# Patient Record
Sex: MALE | Race: WHITE | NOT HISPANIC OR LATINO | Employment: UNEMPLOYED | URBAN - METROPOLITAN AREA
[De-identification: names, ages, dates, MRNs, and addresses within clinical notes are randomized per-mention and may not be internally consistent; named-entity substitution may affect disease eponyms.]

---

## 2017-06-13 ENCOUNTER — RECORDS - HEALTHEAST (OUTPATIENT)
Dept: LAB | Facility: CLINIC | Age: 62
End: 2017-06-13

## 2017-06-13 LAB
CHOLEST SERPL-MCNC: 210 MG/DL
FASTING STATUS PATIENT QL REPORTED: NO
HDLC SERPL-MCNC: 43 MG/DL
LDLC SERPL CALC-MCNC: 140 MG/DL
PSA SERPL-MCNC: 0.7 NG/ML (ref 0–4.5)
TRIGL SERPL-MCNC: 136 MG/DL

## 2021-05-31 ENCOUNTER — RECORDS - HEALTHEAST (OUTPATIENT)
Dept: ADMINISTRATIVE | Facility: CLINIC | Age: 66
End: 2021-05-31

## 2021-08-15 ENCOUNTER — HEALTH MAINTENANCE LETTER (OUTPATIENT)
Age: 66
End: 2021-08-15

## 2021-10-11 ENCOUNTER — HEALTH MAINTENANCE LETTER (OUTPATIENT)
Age: 66
End: 2021-10-11

## 2021-12-01 ENCOUNTER — LAB REQUISITION (OUTPATIENT)
Dept: LAB | Facility: CLINIC | Age: 66
End: 2021-12-01
Payer: MEDICARE

## 2021-12-01 DIAGNOSIS — Z03.818 ENCOUNTER FOR OBSERVATION FOR SUSPECTED EXPOSURE TO OTHER BIOLOGICAL AGENTS RULED OUT: ICD-10-CM

## 2021-12-01 PROCEDURE — U0005 INFEC AGEN DETEC AMPLI PROBE: HCPCS | Performed by: FAMILY MEDICINE

## 2021-12-02 LAB — SARS-COV-2 RNA RESP QL NAA+PROBE: NEGATIVE

## 2022-01-30 ENCOUNTER — HEALTH MAINTENANCE LETTER (OUTPATIENT)
Age: 67
End: 2022-01-30

## 2022-02-08 ENCOUNTER — LAB REQUISITION (OUTPATIENT)
Dept: LAB | Facility: CLINIC | Age: 67
End: 2022-02-08
Payer: MEDICARE

## 2022-02-08 DIAGNOSIS — Z03.818 ENCOUNTER FOR OBSERVATION FOR SUSPECTED EXPOSURE TO OTHER BIOLOGICAL AGENTS RULED OUT: ICD-10-CM

## 2022-02-08 PROCEDURE — 87081 CULTURE SCREEN ONLY: CPT | Mod: ORL | Performed by: NURSE PRACTITIONER

## 2022-02-12 LAB — BACTERIA SPEC CULT: NORMAL

## 2022-06-07 ENCOUNTER — LAB REQUISITION (OUTPATIENT)
Dept: LAB | Facility: CLINIC | Age: 67
End: 2022-06-07
Payer: COMMERCIAL

## 2022-06-07 DIAGNOSIS — I10 ESSENTIAL (PRIMARY) HYPERTENSION: ICD-10-CM

## 2022-06-07 DIAGNOSIS — Z12.5 ENCOUNTER FOR SCREENING FOR MALIGNANT NEOPLASM OF PROSTATE: ICD-10-CM

## 2022-06-07 DIAGNOSIS — E78.5 HYPERLIPIDEMIA, UNSPECIFIED: ICD-10-CM

## 2022-06-07 DIAGNOSIS — Z00.00 ENCOUNTER FOR GENERAL ADULT MEDICAL EXAMINATION WITHOUT ABNORMAL FINDINGS: ICD-10-CM

## 2022-06-07 LAB
ALBUMIN SERPL-MCNC: 4.2 G/DL (ref 3.5–5)
ALP SERPL-CCNC: 58 U/L (ref 45–120)
ALT SERPL W P-5'-P-CCNC: 27 U/L (ref 0–45)
ANION GAP SERPL CALCULATED.3IONS-SCNC: 12 MMOL/L (ref 5–18)
AST SERPL W P-5'-P-CCNC: 26 U/L (ref 0–40)
BILIRUB SERPL-MCNC: 1.6 MG/DL (ref 0–1)
BUN SERPL-MCNC: 19 MG/DL (ref 8–22)
CALCIUM SERPL-MCNC: 9.6 MG/DL (ref 8.5–10.5)
CHLORIDE BLD-SCNC: 104 MMOL/L (ref 98–107)
CHOLEST SERPL-MCNC: 201 MG/DL
CO2 SERPL-SCNC: 23 MMOL/L (ref 22–31)
CREAT SERPL-MCNC: 1.05 MG/DL (ref 0.7–1.3)
FASTING STATUS PATIENT QL REPORTED: ABNORMAL
GFR SERPL CREATININE-BSD FRML MDRD: 79 ML/MIN/1.73M2
GLUCOSE BLD-MCNC: 86 MG/DL (ref 70–125)
HDLC SERPL-MCNC: 41 MG/DL
LDLC SERPL CALC-MCNC: 131 MG/DL
POTASSIUM BLD-SCNC: 4.2 MMOL/L (ref 3.5–5)
PROT SERPL-MCNC: 6.9 G/DL (ref 6–8)
PSA SERPL-MCNC: 0.79 UG/L (ref 0–4.5)
SODIUM SERPL-SCNC: 139 MMOL/L (ref 136–145)
TRIGL SERPL-MCNC: 145 MG/DL

## 2022-06-07 PROCEDURE — G0103 PSA SCREENING: HCPCS | Mod: ORL | Performed by: FAMILY MEDICINE

## 2022-06-07 PROCEDURE — 80053 COMPREHEN METABOLIC PANEL: CPT | Mod: ORL | Performed by: FAMILY MEDICINE

## 2022-06-07 PROCEDURE — 80061 LIPID PANEL: CPT | Mod: ORL | Performed by: FAMILY MEDICINE

## 2022-07-05 ENCOUNTER — LAB REQUISITION (OUTPATIENT)
Dept: LAB | Facility: CLINIC | Age: 67
End: 2022-07-05

## 2022-07-05 DIAGNOSIS — I10 ESSENTIAL (PRIMARY) HYPERTENSION: ICD-10-CM

## 2022-07-05 LAB
ANION GAP SERPL CALCULATED.3IONS-SCNC: 13 MMOL/L (ref 7–15)
BUN SERPL-MCNC: 16 MG/DL (ref 8–23)
CALCIUM SERPL-MCNC: 9 MG/DL (ref 8.8–10.2)
CHLORIDE SERPL-SCNC: 104 MMOL/L (ref 98–107)
CREAT SERPL-MCNC: 1.02 MG/DL (ref 0.67–1.17)
DEPRECATED HCO3 PLAS-SCNC: 21 MMOL/L (ref 22–29)
GFR SERPL CREATININE-BSD FRML MDRD: 82 ML/MIN/1.73M2
GLUCOSE SERPL-MCNC: 119 MG/DL (ref 70–99)
POTASSIUM SERPL-SCNC: 3.6 MMOL/L (ref 3.4–5.3)
SODIUM SERPL-SCNC: 138 MMOL/L (ref 136–145)

## 2022-07-05 PROCEDURE — 80048 BASIC METABOLIC PNL TOTAL CA: CPT | Performed by: FAMILY MEDICINE

## 2022-09-25 ENCOUNTER — HEALTH MAINTENANCE LETTER (OUTPATIENT)
Age: 67
End: 2022-09-25

## 2022-11-23 ENCOUNTER — LAB REQUISITION (OUTPATIENT)
Dept: LAB | Facility: CLINIC | Age: 67
End: 2022-11-23

## 2022-11-23 DIAGNOSIS — I10 ESSENTIAL (PRIMARY) HYPERTENSION: ICD-10-CM

## 2022-11-23 LAB
ANION GAP SERPL CALCULATED.3IONS-SCNC: 13 MMOL/L (ref 7–15)
BUN SERPL-MCNC: 17.7 MG/DL (ref 8–23)
CALCIUM SERPL-MCNC: 9.3 MG/DL (ref 8.8–10.2)
CHLORIDE SERPL-SCNC: 101 MMOL/L (ref 98–107)
CREAT SERPL-MCNC: 1.12 MG/DL (ref 0.67–1.17)
DEPRECATED HCO3 PLAS-SCNC: 24 MMOL/L (ref 22–29)
GFR SERPL CREATININE-BSD FRML MDRD: 73 ML/MIN/1.73M2
GLUCOSE SERPL-MCNC: 90 MG/DL (ref 70–99)
POTASSIUM SERPL-SCNC: 4.2 MMOL/L (ref 3.4–5.3)
SODIUM SERPL-SCNC: 138 MMOL/L (ref 136–145)

## 2022-11-23 PROCEDURE — 80048 BASIC METABOLIC PNL TOTAL CA: CPT | Performed by: NURSE PRACTITIONER

## 2023-03-02 ENCOUNTER — LAB REQUISITION (OUTPATIENT)
Dept: LAB | Facility: CLINIC | Age: 68
End: 2023-03-02

## 2023-03-02 DIAGNOSIS — I10 ESSENTIAL (PRIMARY) HYPERTENSION: ICD-10-CM

## 2023-03-02 LAB
ANION GAP SERPL CALCULATED.3IONS-SCNC: 12 MMOL/L (ref 7–15)
BUN SERPL-MCNC: 20.5 MG/DL (ref 8–23)
CALCIUM SERPL-MCNC: 9.2 MG/DL (ref 8.8–10.2)
CHLORIDE SERPL-SCNC: 99 MMOL/L (ref 98–107)
CREAT SERPL-MCNC: 1.46 MG/DL (ref 0.67–1.17)
DEPRECATED HCO3 PLAS-SCNC: 25 MMOL/L (ref 22–29)
GFR SERPL CREATININE-BSD FRML MDRD: 53 ML/MIN/1.73M2
GLUCOSE SERPL-MCNC: 84 MG/DL (ref 70–99)
POTASSIUM SERPL-SCNC: 4.3 MMOL/L (ref 3.4–5.3)
SODIUM SERPL-SCNC: 136 MMOL/L (ref 136–145)

## 2023-03-02 PROCEDURE — 84295 ASSAY OF SERUM SODIUM: CPT | Performed by: NURSE PRACTITIONER

## 2023-03-31 ENCOUNTER — LAB REQUISITION (OUTPATIENT)
Dept: LAB | Facility: CLINIC | Age: 68
End: 2023-03-31

## 2023-03-31 DIAGNOSIS — M25.561 PAIN IN RIGHT KNEE: ICD-10-CM

## 2023-03-31 LAB — URATE SERPL-MCNC: 7 MG/DL (ref 3.4–7)

## 2023-03-31 PROCEDURE — 84550 ASSAY OF BLOOD/URIC ACID: CPT | Performed by: PHYSICIAN ASSISTANT

## 2023-04-17 ENCOUNTER — LAB REQUISITION (OUTPATIENT)
Dept: LAB | Facility: CLINIC | Age: 68
End: 2023-04-17

## 2023-04-17 DIAGNOSIS — I10 ESSENTIAL (PRIMARY) HYPERTENSION: ICD-10-CM

## 2023-04-17 LAB
ALBUMIN SERPL BCG-MCNC: 4.7 G/DL (ref 3.5–5.2)
ALP SERPL-CCNC: 57 U/L (ref 40–129)
ALT SERPL W P-5'-P-CCNC: 26 U/L (ref 10–50)
ANION GAP SERPL CALCULATED.3IONS-SCNC: 15 MMOL/L (ref 7–15)
AST SERPL W P-5'-P-CCNC: 21 U/L (ref 10–50)
BILIRUB SERPL-MCNC: 1.3 MG/DL
BUN SERPL-MCNC: 20.3 MG/DL (ref 8–23)
CALCIUM SERPL-MCNC: 9.9 MG/DL (ref 8.8–10.2)
CHLORIDE SERPL-SCNC: 95 MMOL/L (ref 98–107)
CREAT SERPL-MCNC: 1.32 MG/DL (ref 0.67–1.17)
DEPRECATED HCO3 PLAS-SCNC: 25 MMOL/L (ref 22–29)
GFR SERPL CREATININE-BSD FRML MDRD: 59 ML/MIN/1.73M2
GLUCOSE SERPL-MCNC: 94 MG/DL (ref 70–99)
POTASSIUM SERPL-SCNC: 5.3 MMOL/L (ref 3.4–5.3)
PROT SERPL-MCNC: 7.1 G/DL (ref 6.4–8.3)
SODIUM SERPL-SCNC: 135 MMOL/L (ref 136–145)

## 2023-04-17 PROCEDURE — 80053 COMPREHEN METABOLIC PANEL: CPT | Performed by: PHYSICIAN ASSISTANT

## 2023-05-13 ENCOUNTER — HEALTH MAINTENANCE LETTER (OUTPATIENT)
Age: 68
End: 2023-05-13

## 2023-06-20 ENCOUNTER — APPOINTMENT (OUTPATIENT)
Dept: CT IMAGING | Facility: HOSPITAL | Age: 68
End: 2023-06-20
Attending: STUDENT IN AN ORGANIZED HEALTH CARE EDUCATION/TRAINING PROGRAM
Payer: COMMERCIAL

## 2023-06-20 ENCOUNTER — APPOINTMENT (OUTPATIENT)
Dept: SPEECH THERAPY | Facility: CLINIC | Age: 68
DRG: 037 | End: 2023-06-20
Attending: PHYSICIAN ASSISTANT
Payer: COMMERCIAL

## 2023-06-20 ENCOUNTER — APPOINTMENT (OUTPATIENT)
Dept: CARDIOLOGY | Facility: CLINIC | Age: 68
DRG: 037 | End: 2023-06-20
Attending: PHYSICIAN ASSISTANT
Payer: COMMERCIAL

## 2023-06-20 ENCOUNTER — HOSPITAL ENCOUNTER (INPATIENT)
Facility: CLINIC | Age: 68
LOS: 4 days | Discharge: HOME | DRG: 037 | End: 2023-06-24
Attending: HOSPITALIST | Admitting: INTERNAL MEDICINE
Payer: COMMERCIAL

## 2023-06-20 ENCOUNTER — APPOINTMENT (OUTPATIENT)
Dept: MRI IMAGING | Facility: HOSPITAL | Age: 68
End: 2023-06-20
Attending: EMERGENCY MEDICINE
Payer: COMMERCIAL

## 2023-06-20 ENCOUNTER — HOSPITAL ENCOUNTER (EMERGENCY)
Facility: HOSPITAL | Age: 68
Discharge: ANOTHER HOSPITAL | End: 2023-06-20
Attending: EMERGENCY MEDICINE | Admitting: EMERGENCY MEDICINE
Payer: COMMERCIAL

## 2023-06-20 VITALS
RESPIRATION RATE: 25 BRPM | WEIGHT: 155 LBS | TEMPERATURE: 98.2 F | OXYGEN SATURATION: 94 % | HEIGHT: 66 IN | HEART RATE: 85 BPM | BODY MASS INDEX: 24.91 KG/M2 | SYSTOLIC BLOOD PRESSURE: 158 MMHG | DIASTOLIC BLOOD PRESSURE: 104 MMHG

## 2023-06-20 DIAGNOSIS — I63.9 ACUTE CVA (CEREBROVASCULAR ACCIDENT) (H): Primary | ICD-10-CM

## 2023-06-20 DIAGNOSIS — I65.22 STENOSIS OF LEFT CAROTID ARTERY: ICD-10-CM

## 2023-06-20 DIAGNOSIS — I10 BENIGN ESSENTIAL HYPERTENSION: ICD-10-CM

## 2023-06-20 DIAGNOSIS — R47.81 SLURRED SPEECH: ICD-10-CM

## 2023-06-20 DIAGNOSIS — E78.5 HYPERLIPIDEMIA LDL GOAL <70: ICD-10-CM

## 2023-06-20 LAB
ANION GAP SERPL CALCULATED.3IONS-SCNC: 12 MMOL/L (ref 7–15)
APTT PPP: 24 SECONDS (ref 22–38)
BASOPHILS # BLD AUTO: 0 10E3/UL (ref 0–0.2)
BASOPHILS NFR BLD AUTO: 0 %
BUN SERPL-MCNC: 19.6 MG/DL (ref 8–23)
CALCIUM SERPL-MCNC: 9.8 MG/DL (ref 8.8–10.2)
CHLORIDE SERPL-SCNC: 101 MMOL/L (ref 98–107)
CREAT SERPL-MCNC: 1.09 MG/DL (ref 0.67–1.17)
DEPRECATED HCO3 PLAS-SCNC: 24 MMOL/L (ref 22–29)
EOSINOPHIL # BLD AUTO: 0 10E3/UL (ref 0–0.7)
EOSINOPHIL NFR BLD AUTO: 0 %
ERYTHROCYTE [DISTWIDTH] IN BLOOD BY AUTOMATED COUNT: 13.1 % (ref 10–15)
GFR SERPL CREATININE-BSD FRML MDRD: 75 ML/MIN/1.73M2
GLUCOSE BLDC GLUCOMTR-MCNC: 102 MG/DL (ref 70–99)
GLUCOSE BLDC GLUCOMTR-MCNC: 135 MG/DL (ref 70–99)
GLUCOSE BLDC GLUCOMTR-MCNC: 96 MG/DL (ref 70–99)
GLUCOSE SERPL-MCNC: 131 MG/DL (ref 70–99)
HBA1C MFR BLD: 5.9 %
HCT VFR BLD AUTO: 44.6 % (ref 40–53)
HGB BLD-MCNC: 14.7 G/DL (ref 13.3–17.7)
IMM GRANULOCYTES # BLD: 0 10E3/UL
IMM GRANULOCYTES NFR BLD: 0 %
INR PPP: 1.01 (ref 0.85–1.15)
LVEF ECHO: NORMAL
LYMPHOCYTES # BLD AUTO: 1.1 10E3/UL (ref 0.8–5.3)
LYMPHOCYTES NFR BLD AUTO: 8 %
MCH RBC QN AUTO: 27.9 PG (ref 26.5–33)
MCHC RBC AUTO-ENTMCNC: 33 G/DL (ref 31.5–36.5)
MCV RBC AUTO: 85 FL (ref 78–100)
MONOCYTES # BLD AUTO: 0.9 10E3/UL (ref 0–1.3)
MONOCYTES NFR BLD AUTO: 6 %
NEUTROPHILS # BLD AUTO: 11.6 10E3/UL (ref 1.6–8.3)
NEUTROPHILS NFR BLD AUTO: 86 %
NRBC # BLD AUTO: 0 10E3/UL
NRBC BLD AUTO-RTO: 0 /100
PLATELET # BLD AUTO: 209 10E3/UL (ref 150–450)
POTASSIUM SERPL-SCNC: 4.1 MMOL/L (ref 3.4–5.3)
RBC # BLD AUTO: 5.27 10E6/UL (ref 4.4–5.9)
SARS-COV-2 RNA RESP QL NAA+PROBE: NEGATIVE
SODIUM SERPL-SCNC: 137 MMOL/L (ref 136–145)
TROPONIN T SERPL HS-MCNC: 7 NG/L
TROPONIN T SERPL HS-MCNC: 7 NG/L
WBC # BLD AUTO: 13.6 10E3/UL (ref 4–11)

## 2023-06-20 PROCEDURE — 87635 SARS-COV-2 COVID-19 AMP PRB: CPT | Performed by: PHYSICIAN ASSISTANT

## 2023-06-20 PROCEDURE — 93005 ELECTROCARDIOGRAM TRACING: CPT

## 2023-06-20 PROCEDURE — 250N000011 HC RX IP 250 OP 636: Performed by: EMERGENCY MEDICINE

## 2023-06-20 PROCEDURE — 99222 1ST HOSP IP/OBS MODERATE 55: CPT | Performed by: STUDENT IN AN ORGANIZED HEALTH CARE EDUCATION/TRAINING PROGRAM

## 2023-06-20 PROCEDURE — A9585 GADOBUTROL INJECTION: HCPCS | Performed by: EMERGENCY MEDICINE

## 2023-06-20 PROCEDURE — 82962 GLUCOSE BLOOD TEST: CPT

## 2023-06-20 PROCEDURE — 99223 1ST HOSP IP/OBS HIGH 75: CPT | Performed by: PHYSICIAN ASSISTANT

## 2023-06-20 PROCEDURE — 70496 CT ANGIOGRAPHY HEAD: CPT | Mod: MG

## 2023-06-20 PROCEDURE — 120N000001 HC R&B MED SURG/OB

## 2023-06-20 PROCEDURE — 99291 CRITICAL CARE FIRST HOUR: CPT | Mod: 25

## 2023-06-20 PROCEDURE — 92610 EVALUATE SWALLOWING FUNCTION: CPT | Mod: GN | Performed by: SPEECH-LANGUAGE PATHOLOGIST

## 2023-06-20 PROCEDURE — G1010 CDSM STANSON: HCPCS

## 2023-06-20 PROCEDURE — 99207 PR CHGS TRANSFERRED TO HOSPITAL: CPT | Performed by: PHYSICIAN ASSISTANT

## 2023-06-20 PROCEDURE — 85610 PROTHROMBIN TIME: CPT | Performed by: STUDENT IN AN ORGANIZED HEALTH CARE EDUCATION/TRAINING PROGRAM

## 2023-06-20 PROCEDURE — 85730 THROMBOPLASTIN TIME PARTIAL: CPT | Performed by: STUDENT IN AN ORGANIZED HEALTH CARE EDUCATION/TRAINING PROGRAM

## 2023-06-20 PROCEDURE — 36415 COLL VENOUS BLD VENIPUNCTURE: CPT | Performed by: STUDENT IN AN ORGANIZED HEALTH CARE EDUCATION/TRAINING PROGRAM

## 2023-06-20 PROCEDURE — 84484 ASSAY OF TROPONIN QUANT: CPT | Performed by: STUDENT IN AN ORGANIZED HEALTH CARE EDUCATION/TRAINING PROGRAM

## 2023-06-20 PROCEDURE — 82310 ASSAY OF CALCIUM: CPT | Performed by: STUDENT IN AN ORGANIZED HEALTH CARE EDUCATION/TRAINING PROGRAM

## 2023-06-20 PROCEDURE — 99223 1ST HOSP IP/OBS HIGH 75: CPT | Performed by: SURGERY

## 2023-06-20 PROCEDURE — 83036 HEMOGLOBIN GLYCOSYLATED A1C: CPT | Performed by: PHYSICIAN ASSISTANT

## 2023-06-20 PROCEDURE — 93306 TTE W/DOPPLER COMPLETE: CPT

## 2023-06-20 PROCEDURE — 70498 CT ANGIOGRAPHY NECK: CPT | Mod: MG

## 2023-06-20 PROCEDURE — 36415 COLL VENOUS BLD VENIPUNCTURE: CPT | Performed by: PHYSICIAN ASSISTANT

## 2023-06-20 PROCEDURE — 0042T CT HEAD PERFUSION W CONTRAST: CPT

## 2023-06-20 PROCEDURE — 93306 TTE W/DOPPLER COMPLETE: CPT | Mod: 26 | Performed by: INTERNAL MEDICINE

## 2023-06-20 PROCEDURE — 85025 COMPLETE CBC W/AUTO DIFF WBC: CPT | Performed by: STUDENT IN AN ORGANIZED HEALTH CARE EDUCATION/TRAINING PROGRAM

## 2023-06-20 PROCEDURE — 255N000002 HC RX 255 OP 636: Performed by: EMERGENCY MEDICINE

## 2023-06-20 PROCEDURE — 70553 MRI BRAIN STEM W/O & W/DYE: CPT | Mod: MG

## 2023-06-20 PROCEDURE — 80061 LIPID PANEL: CPT | Performed by: PHYSICIAN ASSISTANT

## 2023-06-20 PROCEDURE — 84484 ASSAY OF TROPONIN QUANT: CPT | Performed by: PHYSICIAN ASSISTANT

## 2023-06-20 PROCEDURE — 93005 ELECTROCARDIOGRAM TRACING: CPT | Performed by: STUDENT IN AN ORGANIZED HEALTH CARE EDUCATION/TRAINING PROGRAM

## 2023-06-20 PROCEDURE — 93010 ELECTROCARDIOGRAM REPORT: CPT | Mod: 59 | Performed by: INTERNAL MEDICINE

## 2023-06-20 PROCEDURE — 250N000013 HC RX MED GY IP 250 OP 250 PS 637: Performed by: PHYSICIAN ASSISTANT

## 2023-06-20 RX ORDER — ATORVASTATIN CALCIUM 80 MG/1
80 TABLET, FILM COATED ORAL EVERY EVENING
Status: DISCONTINUED | OUTPATIENT
Start: 2023-06-20 | End: 2023-06-23

## 2023-06-20 RX ORDER — ONDANSETRON 4 MG/1
4 TABLET, ORALLY DISINTEGRATING ORAL EVERY 6 HOURS PRN
Status: DISCONTINUED | OUTPATIENT
Start: 2023-06-20 | End: 2023-06-24 | Stop reason: HOSPADM

## 2023-06-20 RX ORDER — IOPAMIDOL 755 MG/ML
75 INJECTION, SOLUTION INTRAVASCULAR ONCE
Status: COMPLETED | OUTPATIENT
Start: 2023-06-20 | End: 2023-06-20

## 2023-06-20 RX ORDER — LISINOPRIL AND HYDROCHLOROTHIAZIDE 12.5; 2 MG/1; MG/1
1 TABLET ORAL EVERY MORNING
COMMUNITY
End: 2024-04-09

## 2023-06-20 RX ORDER — IOPAMIDOL 755 MG/ML
50 INJECTION, SOLUTION INTRAVASCULAR ONCE
Status: COMPLETED | OUTPATIENT
Start: 2023-06-20 | End: 2023-06-20

## 2023-06-20 RX ORDER — CLOPIDOGREL BISULFATE 75 MG/1
300 TABLET ORAL ONCE
Status: DISCONTINUED | OUTPATIENT
Start: 2023-06-20 | End: 2023-06-20 | Stop reason: HOSPADM

## 2023-06-20 RX ORDER — ASPIRIN 325 MG
325 TABLET ORAL DAILY
Status: DISCONTINUED | OUTPATIENT
Start: 2023-06-20 | End: 2023-06-23

## 2023-06-20 RX ORDER — CLOPIDOGREL BISULFATE 75 MG/1
75 TABLET ORAL DAILY
Status: DISCONTINUED | OUTPATIENT
Start: 2023-06-21 | End: 2023-06-20 | Stop reason: HOSPADM

## 2023-06-20 RX ORDER — GADOBUTROL 604.72 MG/ML
7 INJECTION INTRAVENOUS ONCE
Status: COMPLETED | OUTPATIENT
Start: 2023-06-20 | End: 2023-06-20

## 2023-06-20 RX ORDER — CLOPIDOGREL BISULFATE 75 MG/1
300 TABLET ORAL ONCE
Status: COMPLETED | OUTPATIENT
Start: 2023-06-20 | End: 2023-06-20

## 2023-06-20 RX ORDER — ATORVASTATIN CALCIUM 40 MG/1
80 TABLET, FILM COATED ORAL EVERY EVENING
Status: DISCONTINUED | OUTPATIENT
Start: 2023-06-20 | End: 2023-06-20 | Stop reason: HOSPADM

## 2023-06-20 RX ORDER — ASPIRIN 325 MG
325 TABLET ORAL DAILY
Status: DISCONTINUED | OUTPATIENT
Start: 2023-06-20 | End: 2023-06-20 | Stop reason: HOSPADM

## 2023-06-20 RX ORDER — ONDANSETRON 2 MG/ML
4 INJECTION INTRAMUSCULAR; INTRAVENOUS EVERY 6 HOURS PRN
Status: DISCONTINUED | OUTPATIENT
Start: 2023-06-20 | End: 2023-06-24 | Stop reason: HOSPADM

## 2023-06-20 RX ORDER — CLOPIDOGREL BISULFATE 75 MG/1
75 TABLET ORAL DAILY
Status: DISCONTINUED | OUTPATIENT
Start: 2023-06-21 | End: 2023-06-23

## 2023-06-20 RX ORDER — LIDOCAINE 40 MG/G
CREAM TOPICAL
Status: DISCONTINUED | OUTPATIENT
Start: 2023-06-20 | End: 2023-06-23

## 2023-06-20 RX ADMIN — IOPAMIDOL 50 ML: 755 INJECTION, SOLUTION INTRAVENOUS at 10:36

## 2023-06-20 RX ADMIN — ATORVASTATIN CALCIUM 80 MG: 80 TABLET, FILM COATED ORAL at 21:36

## 2023-06-20 RX ADMIN — IOPAMIDOL 75 ML: 755 INJECTION, SOLUTION INTRAVENOUS at 10:34

## 2023-06-20 RX ADMIN — ASPIRIN 325 MG ORAL TABLET 325 MG: 325 PILL ORAL at 16:14

## 2023-06-20 RX ADMIN — GADOBUTROL 7 ML: 604.72 INJECTION INTRAVENOUS at 12:30

## 2023-06-20 RX ADMIN — CLOPIDOGREL BISULFATE 300 MG: 75 TABLET ORAL at 16:15

## 2023-06-20 ASSESSMENT — ACTIVITIES OF DAILY LIVING (ADL)
ADLS_ACUITY_SCORE: 22
ADLS_ACUITY_SCORE: 22
ADLS_ACUITY_SCORE: 35
DIFFICULTY_EATING/SWALLOWING: NO
ADLS_ACUITY_SCORE: 22
WEAR_GLASSES_OR_BLIND: YES
DOING_ERRANDS_INDEPENDENTLY_DIFFICULTY: NO
CONCENTRATING,_REMEMBERING_OR_MAKING_DECISIONS_DIFFICULTY: NO
DRESSING/BATHING_DIFFICULTY: NO
ADLS_ACUITY_SCORE: 35
ADLS_ACUITY_SCORE: 22
WALKING_OR_CLIMBING_STAIRS_DIFFICULTY: NO
TOILETING_ISSUES: NO
VISION_MANAGEMENT: GLASSES
ADLS_ACUITY_SCORE: 22
FALL_HISTORY_WITHIN_LAST_SIX_MONTHS: NO
CHANGE_IN_FUNCTIONAL_STATUS_SINCE_ONSET_OF_CURRENT_ILLNESS/INJURY: YES

## 2023-06-20 NOTE — PROGRESS NOTES
06/20/23 4384   Appointment Info   Signing Clinician's Name / Credentials (SLP) Santa Berger MS CCC SLP   General Information   Onset of Illness/Injury or Date of Surgery 06/20/23   Referring Physician Cat Rae PA-C   Patient/Family Therapy Goal Statement (SLP) Patient is hungry and thirsty.   Pertinent History of Current Problem Pertinent Labs & Imaging studies reviewed. (See chart for details)  67 year old male with history of HTN, HLD who presents to the Emergency Department for evaluation of slurred speech.  Normal yesterday, spoke with family this morning on the phone with garbled/slurred speech.  Patient stated he felt off all morning but was not speaking to anybody.  Last known well yesterday.  Does have some loss of fluency here with slurring.  NIHSS 1.  Differential includes CVA, TIA, electrolyte abnormality.  No report of seizure activity.   General Observations Per notes: Pertinent Labs & Imaging studies reviewed. (See chart for details)  67 year old male with history of HTN, HLD who presents to the Emergency Department for evaluation of slurred speech.  Normal yesterday, spoke with family this morning on the phone with garbled/slurred speech.  Patient stated he felt off all morning but was not speaking to anybody.  Last known well yesterday.  Does have some loss of fluency here with slurring.  NIHSS 1.  Differential includes CVA, TIA, electrolyte abnormality.  No report of seizure activity.   Type of Evaluation   Type of Evaluation Swallow Evaluation   Oral Motor   Oral Musculature generally intact   Structural Abnormalities none present   Mucosal Quality dry   Dentition (Oral Motor)   Dentition (Oral Motor) significant number of missing teeth;dentition impacts chewing ability;other (see comments)  (Patient has five teeth.)   Facial Symmetry (Oral Motor)   Facial Symmetry (Oral Motor) WNL   Lip Function (Oral Motor)   Lip Range of Motion (Oral Motor) WNL   Tongue Function (Oral  Motor)   Tongue ROM (Oral Motor) WNL   Jaw Function (Oral Motor)   Jaw Function (Oral Motor) WNL   Cough/Swallow/Gag Reflex (Oral Motor)   Soft Palate/Velum (Oral Motor) WNL   Volitional Throat Clear/Cough (Oral Motor) WNL   Volitional Swallow (Oral Motor) WNL   Vocal Quality/Secretion Management (Oral Motor)   Vocal Quality (Oral Motor) WFL   General Swallowing Observations   Past History of Dysphagia No documented history   Respiratory Support (General Swallowing Observations) none   Current Diet/Method of Nutritional Intake (General Swallowing Observations, NIS) NPO   Swallowing Evaluation Clinical swallow evaluation   Clinical Swallow Evaluation   Feeding Assistance no assistance needed   Clinical Swallow Evaluation Textures Trialed thin liquids;pureed;solid foods   Clinical Swallow Eval: Thin Liquid Texture Trial   Mode of Presentation, Thin Liquids cup;self-fed   Volume of Liquid or Food Presented 6 oz of water   Oral Phase of Swallow WFL   Pharyngeal Phase of Swallow intact   Diagnostic Statement No immediate or delayed sx of aspiration.   Clinical Swallow Evaluation: Puree Solid Texture Trial   Mode of Presentation, Puree spoon;self-fed   Volume of Puree Presented 2 oz of pudding   Oral Phase, Puree WFL   Pharyngeal Phase, Puree intact   Clinical Swallow Evaluation: Solid Food Texture Trial   Mode of Presentation self-fed   Volume Presented 1/2 dileep cracker   Oral Phase impaired mastication;residue in oral cavity   Oral Residue mid posterior tongue   Pharyngeal Phase impaired;feeling of something stuck in throat;repeated swallows   Diagnostic Statement Patient reported a globus sensation with the cracker that cleared with a liquid rinse.   Esophageal Phase of Swallow   Patient reports or presents with symptoms of esophageal dysphagia Yes   Esophageal comments Patient reports a history of GERDS.   Swallowing Recommendations   Diet Consistency Recommendations soft & bite-sized (level 6);thin liquids (level  0)   Supervision Level for Intake distant supervision needed   Mode of Delivery Recommendations bolus size, small;food moistened;no straws;slow rate of intake   Postural Recommendations none   Swallowing Maneuver Recommendations alternate food and liquid intake   Monitoring/Assistance Required (Eating/Swallowing) check mouth frequently for oral residue/pocketing;stop eating activities when fatigue is present;monitor for cough or change in vocal quality with intake   Recommended Feeding/Eating Techniques (Swallow Eval) maintain upright sitting position for eating;maintain upright posture during/after eating for 30 minutes;minimize distractions during oral intake   Medication Administration Recommendations, Swallowing (SLP) Whole as tolerated.   General Therapy Interventions   Planned Therapy Interventions Dysphagia Treatment   Dysphagia treatment Modified diet education;Instruction of safe swallow strategies   Clinical Impression   Criteria for Skilled Therapeutic Interventions Met (SLP Eval) Yes, treatment indicated   SLP Diagnosis Mild oral and pharyngeal dysphagia   Risks & Benefits of therapy have been explained evaluation/treatment results reviewed;care plan/treatment goals reviewed;risks/benefits reviewed;current/potential barriers reviewed;participants voiced agreement with care plan;participants included;patient   Clinical Impression Comments Patient presents with mild oral and pharyngeal dysphagia at bedside. Patient's oral motor exam was WFL. He does have mild slurred speech but lacks dentition (5 teeth), but noted to have mild word finding in conversation. (Thinking of the name of his clinic). He demonstrated a timely swallow response with thin liquids. Mastication was prolonged given lack of dentition, mildly decreased bolus control during AP movement with minimal to mild oral residue. He reported a globus sensation after swallowing the cracker. The sensation cleared with a liquid rinse.     Recommend: 1.  IDDSI level 6 soft/bite size and thin liquids. 2. Upight, no straws, slow rate and alternate liquids//solids. 3. SLP will follow up for diet tolerance and advancement. Hold diet if any sx of aspiration present or changes in his respiratory status.     SLP Total Evaluation Time   Eval: oral/pharyngeal swallow function, clinical swallow Minutes (51929) 15   SLP Goals   Therapy Frequency (SLP Eval) 5 times/wk   SLP Predicted Duration/Target Date for Goal Attainment 06/27/23   SLP Goals Swallow   SLP: Safely tolerate diet without signs/symptoms of aspiration Regular diet;Thin liquids;With use of swallow precautions;With assistance/supervision   Interventions   Interventions Quick Adds Swallowing Dysfunction   SLP Discharge Planning   SLP Plan Meal f/u advance as tolerated.   SLP Discharge Recommendation home with outpatient speech therapy;home with assist   SLP Rationale for DC Rec Patient's swallow and communication are mildly below his baseline, but anticipate they will be met prior to discharge.   SLP Brief overview of current status  Minimal to mild oral and pharyngeal dysphagia recommend IDDSI level 6 soft/bite size and thin liquids.   Total Session Time   Total Session Time (sum of timed and untimed services) 15

## 2023-06-20 NOTE — ED NOTES
Bed: JNEDH-B  Expected date:   Expected time:   Means of arrival:   Comments:  Tier 2 STROKE CODE BRITNEY

## 2023-06-20 NOTE — CONSULTS
[facility]    Stroke Telephone Note    I was called by Basia Fortune on 06/20/23 regarding patient Rich Moffett. The patient is a 67 year old male without known pertinent medical history.    He presented to the [facility] ED today for speech changes. He was LKW 2p yesterday when spoke with family. This AM felt off and shaky but hadn't tried to speak until called family and was garbled/slurred. In ED speech improved with only mild slurring. /103.    Stroke Code Data (for stroke code without tele)  Stroke code activated 06/20/23   1012   Stroke provider first response  06/20/23   1014            Last known normal 06/19/23   1400        Time of discovery   (or onset of symptoms) 06/20/23   (S)  (when spoke to family on the phone, but had been up prior not speaking but feeling off)   Head CT read by Stroke Neuro Dr/Provider 06/20/23   1020   Was stroke code de-escalated? Yes 06/20/23 1040          Imaging Findings   HEAD CT:  1.  No acute intracranial abnormality.     HEAD CTA:   1.  All of the major large-caliber proximal intracranial vessels are patent without definite aneurysm or AVM.  2.  Mild to moderate calcific atheromatous changes in both carotid siphons.     NECK CTA:  1.  There are 2 somewhat irregular atheromatous stenoses in the proximal left ICA. The more proximal stenosis at the origin of the left ICA is in the 60% range. At the distal aspect of the left carotid bulb is a more severe stenosis in the 85% range.   Flow is preserved distally in the left ICA without dissection changes.  2.  No significant stenosis or dissection in the right carotid system or in either vertebral artery in the neck.     CT PERFUSION:  1.  Normal cerebral perfusion.  CBF 0 mL, Tmax 0 mL    Intravenous Thrombolysis  Not given due to:   - minor/isolated/quickly resolving symptoms  - unclear or unfavorable risk-benefit profile for extended window thrombolysis beyond the conventional 4.5 hour  "time window    Endovascular Treatment  Not initiated due to absence of proximal vessel occlusion    Impression  Slurred/garbled speech, evaluate for stroke    2 somewhat irregular atheromatous stenoses in the proximal left ICA. The more proximal stenosis at the origin of the left ICA is in the 60% range. At the distal aspect of the left carotid bulb is a more severe stenosis in the 85% range, query if symptomatic    Recommendations   -MRI brain w/wo contrast, please page stroke team once completed so we can review imaging  -SBP goal <220 pending MRI  -if symptomatic L ICA then will need vascular surgery evaluation    My recommendations are based on the information provided over the phone by Rich Moffett's in-person providers. They are not intended to replace the clinical judgment of his in-person providers. I was not requested to personally see or examine the patient at this time.    Alexus Blackwell PA-C  Vascular Neurology    To page me or covering stroke neurology team member, click here: [facility]  Choose \"On Call\" tab at top, then select \"NEUROLOGY/ALL SITES\" from middle drop-down box, press Enter, then look for \"stroke\" or \"telestroke\" for your site.      "

## 2023-06-20 NOTE — CONSULTS
Vascular surgery attending staff note: I have seen and examined the patient myself.  There is a kind consultation from Ms. Cat Bell for severe left carotid stenosis.    History of presenting illness: Patient is a right-hand-dominant 67-year-old male.  When I interviewed him he was accompanied by his daughter and his son-in-law.  He presented to [facility] emergency department earlier today after acute onset of dysarthria.  He was talking on his phone in the morning when they noted dysarthria and patient was brought to the emergency department.  Code stroke was called.  CT angiogram showed severe stenosis of the left carotid bulb and distal internal carotid artery.  Patient and his family tell me that he is almost back to baseline from that initial episode.  Patient denies having had any motor or sensory changes in any other part of the body during this.    Patient does not smoke and he lives with his 5-year-old son.  He also has 3 other independent living older daughters.  1 of which was present today.    Past medical history is notable for hypertension, gastroesophageal reflux disease and sensorineural hearing loss.    Past surgical history includes bilateral knee surgery and left elbow surgery.    Social history: He lives independently.  He does not consume tobacco products.    On examination: He appears comfortable and he is in no acute distress.  Vital signs reviewed.  Mental: Alert and oriented x4, judgment and insight are good.  Cardiac: Regular rate and rhythm, S1 plus S2 +0.  Eyes: Extraocular motions are intact, sclerae are anicteric.  Chest: Clear to auscultation bilaterally.  Neuro: Moving both upper and lower extremities with equal 5 out of 5 power.    Imaging data: CT angiogram of the head and neck that was done earlier today showed 60% stenosis of the carotid bulb with soft plaque with further distally has another area of about 80% stenosis.  Vertebral arteries are patent.    Right  carotid system is patent.    Transthoracic echo is essentially unremarkable.    Diagnosis: Severe asymptomatic left carotid stenosis with transient ischemic attack.    Plan: I explained the pathophysiology of disease to the patient and to his family.  I am waiting for Dr. Cordero to also evaluate the patient from the neurology team.  I will wait for his go ahead but I do feel that this is symptomatic carotid stenosis and patient will benefit from a left carotid endarterectomy for prevention of future disabling stroke.

## 2023-06-20 NOTE — ED NOTES
Patient is in CT with SWAT nurse and ED tech. Will obtain handoff when patient returns to ED. This writer has not seen this patient at this time.

## 2023-06-20 NOTE — ED TRIAGE NOTES
Patient presents here for evaluation of confusion and slurred speech. He did display subtle symptoms over the weekend, but his family reports significant change in mentation, developed slurred speech and has a mild headache.      Triage Assessment     Row Name 06/20/23 1012       Triage Assessment (Adult)    Airway WDL WDL       Respiratory WDL    Respiratory WDL WDL       Skin Circulation/Temperature WDL    Skin Circulation/Temperature WDL WDL       Cardiac WDL    Cardiac WDL WDL       Peripheral/Neurovascular WDL    Peripheral Neurovascular WDL WDL       Cognitive/Neuro/Behavioral WDL    Cognitive/Neuro/Behavioral WDL WDL

## 2023-06-20 NOTE — ED NOTES
Report called to Omaira STEWART at [facility]. Will arrange transport when patient gets back from MRI.

## 2023-06-20 NOTE — ED PROVIDER NOTES
EMERGENCY DEPARTMENT ENCOUNTER      NAME: Rich Moffett  AGE: 67 year old male  YOB: 1955  MRN: [id number]  EVALUATION DATE & TIME: 6/20/2023 10:14 AM    PCP: No primary care provider on file.    ED PROVIDER: Basia Fortune MD    Chief Complaint   Patient presents with     Confusion     Slurred Speech         FINAL IMPRESSION:  1. Slurred speech    2. Stenosis of left carotid artery          ED COURSE & MEDICAL DECISION MAKING:    Pertinent Labs & Imaging studies reviewed. (See chart for details)  67 year old male with history of HTN, HLD who presents to the Emergency Department for evaluation of slurred speech.  Normal yesterday, spoke with family this morning on the phone with garbled/slurred speech.  Patient stated he felt off all morning but was not speaking to anybody.  Last known well yesterday.  Does have some loss of fluency here with slurring.  NIHSS 1.  Differential includes CVA, TIA, electrolyte abnormality.  No report of seizure activity.    Patient initially seen evaluated by myself in triage area due to boarding crisis.  Stroke alert activated.  Expedited for neuroimaging.  CT head unremarkable.  CTA notable for left-sided carotid disease.  CT perfusion study negative.  EKG and laboratory work-up unremarkable.  Stroke neurology involved from ED presentation.  Will be admitted for further management and neurology consultation given ongoing symptoms.  No beds here, beds at [facility].  Patient agreeable to transfer.      ED Course as of 06/20/23 1305   Tue Jun 20, 2023   1017 Spoke w stroke neuro   1029 Spoke w neurorad - CT neg for ICH   1041 Spoke w stroke neuro - de-escalate stoke code   1047 Spoke w neurorad - CTA neg for LVO. 60% stenosis origin L ICA. 80-85% in carotid bulb. Perfusion negative    1146 No bed here.  Neuro bed at [facility] and patient is agreeable to transfer   1205 Spoke w hospitalistKonstantin Dr. Rauniyar       Medical Decision  Making    History:    Supplemental history from: Family Member/Significant Other    External Record(s) reviewed: Documented in chart, if applicable.    Work Up:    Chart documentation includes differential considered and any EKGs or imaging independently interpreted by provider, where specified.    In additional to work up documented, I considered the following work up: See MDM    External consultation:    Discussion of management with another provider: Stroke neurology, neuroradiology, hospitalist    Complicating factors:    Care impacted by chronic illness: Hyperlipidemia and Hypertension    Care affected by social determinants of health: Access to Medical Care    Disposition considerations: Admit.        At the conclusion of the encounter I discussed the results of all of the tests and the disposition. The questions were answered. The patient or family acknowledged understanding and was agreeable with the care plan.    CRITICAL CARE:  Critical Care  Performed by: Basia Fortune MD  Authorized by: Basia Fortune MD     Total critical care time: 42 minutes  Criticalcare time was exclusive of separately billable procedures and treating other patients.  Critical care was necessary to treat or prevent imminent or life-threatening deterioration of the following conditions: Consideration for neuro interventional thrombolysis/thrombectomy  Critical care was time spent personally by me on the following activities: development of treatment plan with patient or surrogate, discussions with consultants, examination of patient, evaluation of patient's response totreatment, obtaining history from patient or surrogate, ordering and performing treatments and interventions, ordering and review of laboratory studies, ordering and review of radiographic studies and re-evaluation ofpatient's condition, this excludes any separately billable procedures.      MEDICATIONS GIVEN IN THE EMERGENCY:  Medications   iopamidol (ISOVUE-370)  "solution 50 mL (50 mLs Intravenous $Given 6/20/23 1036)   iopamidol (ISOVUE-370) solution 75 mL (75 mLs Intravenous $Given 6/20/23 1034)   gadobutrol (GADAVIST) injection 7 mL (7 mLs Intravenous $Given 6/20/23 1230)       NEW PRESCRIPTIONS STARTED AT TODAY'S ER VISIT  New Prescriptions    No medications on file          =================================================================    HPI    Patient information was obtained from: Patient and patient family    Use of Intrepreter: N/A       Rich Moffett is a 67 year old male with no known pertinent medical history who presents with confusion and slurred speech.    Patient's family notes that the patient has had notably decreased mental clarity today, with last well known yesterday around 2:00PM (~20 hours ago). Today the son-in-law came to check on the patient and found him \"sitting there, dazed\" and with slurred speech and weakenss. He notes that at time of evaluation, the speech has become less slurred but is still significantly worse than normal.     Per patient, the patient notes an onset of a headache with ringing in the ears and tremors over the weekend.     Per family, the patient is not on blood thinners, but has blood pressure medications.       PAST MEDICAL HISTORY:  No past medical history on file.    PAST SURGICAL HISTORY:  No past surgical history on file.    CURRENT MEDICATIONS:    None       ALLERGIES:  Not on File    FAMILY HISTORY:  No family history on file.    SOCIAL HISTORY:        VITALS:  Patient Vitals for the past 24 hrs:   BP Temp Pulse Resp SpO2 Height Weight   06/20/23 1146 (!) 156/85 -- 82 25 92 % -- --   06/20/23 1131 (!) 153/85 -- 80 16 94 % -- --   06/20/23 1126 -- -- -- -- 95 % -- --   06/20/23 1111 (!) 150/82 -- 80 17 93 % -- --   06/20/23 1101 (!) 150/71 -- 82 16 94 % -- --   06/20/23 1056 -- -- 89 23 95 % -- --   06/20/23 1045 (!) 155/76 -- 86 (!) 34 -- -- --   06/20/23 1030 (!) 180/103 -- -- -- -- -- --   06/20/23 1008 (!) " "208/103 98.2  F (36.8  C) 84 18 96 % 1.676 m (5' 6\") 70.3 kg (155 lb)       PHYSICAL EXAM    General Appearance: Well-appearing, well-nourished, no acute distress   Head:  Normocephalic  Eyes:  PERRL, conjunctiva/corneas clear, EOM's intact  Cardio:  Regular rate and rhythm, no murmur/gallop/rub  Pulm:  No respiratory distress, clear to auscultation bilaterally  Extremities: Moves extremities normally, normal gait  Skin:  Skin warm, dry, no rashes  Neuro:  Alert and oriented ×3, moving all extremities, no gross sensory defects mild slur of words, particularly with words with 's'. Loss of fluency of speech.     [employer] Stroke Scale  Exam Interval: Baseline   Score    Level of consciousness: (0)   Alert, keenly responsive    LOC questions: (0)   Answers both questions correctly    LOC commands: (0)   Performs both tasks correctly    Best gaze: (0)   Normal    Visual: (0)   No visual loss    Facial palsy: (0)   Normal symmetrical movements    Motor arm (left): (0)   No drift    Motor arm (right): (0)   No drift    Motor leg (left): (0)   No drift    Motor leg (right): (0)   No drift    Limb ataxia: (0)   Absent    Sensory: (0)   Normal- no sensory loss    Best language: (0)   Normal- no aphasia    Dysarthria: (1)   Mild to moderate dysarthria    Extinction and inattention: (0)   No abnormality        Total Score:  1          RADIOLOGY/LABS:  Reviewed all pertinent imaging. Please see official radiology report. All pertinent labs reviewed and interpreted.    Results for orders placed or performed during the hospital encounter of 06/20/23   CTA Head Neck with Contrast    Impression    IMPRESSION:   HEAD CT:  1.  No acute intracranial abnormality.    HEAD CTA:   1.  All of the major large-caliber proximal intracranial vessels are patent without definite aneurysm or AVM.  2.  Mild to moderate calcific atheromatous changes in both carotid siphons.    NECK CTA:  1.  There are 2 somewhat irregular " atheromatous stenoses in the proximal left ICA. The more proximal stenosis at the origin of the left ICA is in the 60% range. At the distal aspect of the left carotid bulb is a more severe stenosis in the 85% range.   Flow is preserved distally in the left ICA without dissection changes.  2.  No significant stenosis or dissection in the right carotid system or in either vertebral artery in the neck.    CT PERFUSION:  1.  Normal cerebral perfusion.    Noncontrast head CT findings called to Dr. Fortune in the [facility] ER at 10:30 AM. CT angiogram and CT perfusion results called to Dr. Fortune at 10:48 AM on 06/20/2023.   CT Head Perfusion w Contrast - For Tier 2 Stroke    Impression    IMPRESSION:   HEAD CT:  1.  No acute intracranial abnormality.    HEAD CTA:   1.  All of the major large-caliber proximal intracranial vessels are patent without definite aneurysm or AVM.  2.  Mild to moderate calcific atheromatous changes in both carotid siphons.    NECK CTA:  1.  There are 2 somewhat irregular atheromatous stenoses in the proximal left ICA. The more proximal stenosis at the origin of the left ICA is in the 60% range. At the distal aspect of the left carotid bulb is a more severe stenosis in the 85% range.   Flow is preserved distally in the left ICA without dissection changes.  2.  No significant stenosis or dissection in the right carotid system or in either vertebral artery in the neck.    CT PERFUSION:  1.  Normal cerebral perfusion.    Noncontrast head CT findings called to Dr. Fortune in the [facility] ER at 10:30 AM. CT angiogram and CT perfusion results called to Dr. Fortune at 10:48 AM on 06/20/2023.   MR Brain w/o & w Contrast    Impression    IMPRESSION:  1.  No findings to explain patient's symptoms. No acute intracranial pathology.  2.  Minimal chronic small vessel ischemic disease.   Basic metabolic panel   Result Value Ref Range    Sodium 137 136 - 145 mmol/L    Potassium 4.1 3.4 - 5.3 mmol/L    Chloride 101  98 - 107 mmol/L    Carbon Dioxide (CO2) 24 22 - 29 mmol/L    Anion Gap 12 7 - 15 mmol/L    Urea Nitrogen 19.6 8.0 - 23.0 mg/dL    Creatinine 1.09 0.67 - 1.17 mg/dL    Calcium 9.8 8.8 - 10.2 mg/dL    Glucose 131 (H) 70 - 99 mg/dL    GFR Estimate 75 >60 mL/min/1.73m2   Result Value Ref Range    INR 1.01 0.85 - 1.15   Partial thromboplastin time   Result Value Ref Range    aPTT 24 22 - 38 Seconds   Result Value Ref Range    Troponin T, High Sensitivity 7 <=22 ng/L   Glucose by meter   Result Value Ref Range    GLUCOSE BY METER POCT 135 (H) 70 - 99 mg/dL   CBC with platelets and differential   Result Value Ref Range    WBC Count 13.6 (H) 4.0 - 11.0 10e3/uL    RBC Count 5.27 4.40 - 5.90 10e6/uL    Hemoglobin 14.7 13.3 - 17.7 g/dL    Hematocrit 44.6 40.0 - 53.0 %    MCV 85 78 - 100 fL    MCH 27.9 26.5 - 33.0 pg    MCHC 33.0 31.5 - 36.5 g/dL    RDW 13.1 10.0 - 15.0 %    Platelet Count 209 150 - 450 10e3/uL    % Neutrophils 86 %    % Lymphocytes 8 %    % Monocytes 6 %    % Eosinophils 0 %    % Basophils 0 %    % Immature Granulocytes 0 %    NRBCs per 100 WBC 0 <1 /100    Absolute Neutrophils 11.6 (H) 1.6 - 8.3 10e3/uL    Absolute Lymphocytes 1.1 0.8 - 5.3 10e3/uL    Absolute Monocytes 0.9 0.0 - 1.3 10e3/uL    Absolute Eosinophils 0.0 0.0 - 0.7 10e3/uL    Absolute Basophils 0.0 0.0 - 0.2 10e3/uL    Absolute Immature Granulocytes 0.0 <=0.4 10e3/uL    Absolute NRBCs 0.0 10e3/uL       EKG:  Sinus rhythm, rate 85.  Nonspecific ST abnormalities but poor data quality due to wavy baseline.  QRS 80, QTc 461.  No previous EKG with which to compare.  I have independently reviewed and interpreted the EKG(s) documented above.      The creation of this record is based on the scribe s observations of the work being performed by Basia Fortune MD and the provider s statements to them. It was created on her behalf by Sander Reece, a trained medical scribe. This document has been checked and approved by the attending provider.    Basia Fortune,  MD  Emergency Medicine  [facility] EMERGENCY DEPARTMENT  [facility] [facility] [id number] [phone]  Dept: [phone]       Basia Fortune MD  06/20/23 0778

## 2023-06-20 NOTE — H&P
[facility]    History and Physical - Hospitalist Service       Date of Admission:  6/20/23     Assessment & Plan   Rich Moffett is a 67 year old male with below PMHX who presented to [facility] ED 6/20/23 after acute onset dysarthria with concern for acute CVA in the setting of L ICA stenosis. Transferred for Neurology and Vascular Surgery consultation.     Dysarthria, suspect acute CVA   Symptomatic L ICA stenosis: No hx of CVA. Note hypertension and borderline cholesterol levels over the years (not on medication). Prior tobacco hx. Family hx of CVA. No known a fib, DM.   * A1C 5.9, trop 7.   * HEAD CT: No acute intracranial abnormality.  HEAD CTA: All of the major large-caliber proximal intracranial vessels are patent without definite aneurysm or AVM.  Mild to moderate calcific atheromatous changes in both carotid siphons.  NECK CTA: There are 2 somewhat irregular atheromatous stenoses in the proximal left ICA. The more proximal stenosis at the origin of the left ICA is in the 60% range. At the distal aspect of the left carotid bulb is a more severe stenosis in the 85% range. Flow is preserved distally in the left ICA without dissection changes. No significant stenosis or dissection in the right carotid system or in either vertebral artery in the neck. CT PERFUSION: Normal cerebral perfusion. MRI BRAIN: No findings to explain patient's symptoms. No acute intracranial pathology. Minimal chronic small vessel ischemic disease.  - Admit under inpatient status   - Stroke Neurology consulted   - Vascular Surgery consulted   - Plavix 300 mg X1 +  mg X1 on 6/20 with plan for Plavix 75 mg daily +  mg daily starting 6/21. Discussed with Neurology  - Atorvastatin 80 mg po every day   - Telemetry   - Allow permissive hypertension   - EKG, echocardiogram   - Lipid profile, repeat trop  - PT/OT/SLP   - NPO until speech eval, then low fat diet     Leukocytosis: WBC 13.6 on admission.  "Suspect reactive in the setting of above. No infectious sx reported on admission.   - Monitor     Described orthostasis: Reports intermittent bouts of positional dizziness at home. No episodes of true syncope, but has fallen because of this.   - Check orthostatics     Hypertension: Hold PTA Lisinopril-hydrochlorothiazide 20-12.5 mg po every day to allow for permissive hypertension given the above.     Chronic, stable medical conditions, not on meds PTA  GERD  Insomnia  Sensorineural hearing loss     Diet: Combination Diet Soft and Bite Sized Diet (level 6); Thin Liquids (level 0) (no straws); Low Saturated Fat Diet  DVT Prophylaxis: Pneumatic Compression Devices  Rodriguez Catheter: Not present  Lines: None     Cardiac Monitoring: ACTIVE order. Indication: Stroke, acute (48 hours)  Code Status: Full Code    Clinically Significant Risk Factors Present on Admission                  # Hypertension: Home medication list includes antihypertensive(s)      # Overweight: Estimated body mass index is 25.02 kg/m  as calculated from the following:    Height as of an earlier encounter on 6/20/23: 1.676 m (5' 6\").    Weight as of an earlier encounter on 6/20/23: 70.3 kg (155 lb).          Disposition Plan      Expected Discharge Date: 06/22/2023                The patient's care was discussed with the Attending Physician, Dr. Goodman, Bedside Nurse and Patient.    Cat Bell PA-C  Hospitalist Service  [facility]  Securely message with [email or web address] (more info)  Text page via [employer] Paging/Directory   ______________________________________________________________________    Chief Complaint   Dysarthria    History is obtained from the patient    History of Present Illness   Rihc Moffett is a 67 year old male with below PMHX who presented to [facility] ED 6/20/23 after acute onset dysarthria with concern for acute CVA in the setting of L ICA stenosis. Transferred for Neurology and Vascular " Surgery consultation.     Pt reports that he was talking on the phone with family ~0800 when they noted dysarthria. EMS was called and pt presented to [facility] ED. Code stroke called with above work-up.     Upon arrival, pt still with dysarthria and mild expressive aphasia, but reports sx are improving, but he is not back to baseline. Denies current headache, but reports severe headache over the weekend which would be unusual for him. Reports intermittent dizziness with positional changes and falls. Denies vision change, unilateral weakness (reports bilateral LE weakness). No chest pain, SOB, nausea, vomiting, recent illness. No additional sx reported. Lives independently.     Past Medical History    Hypertension   GERD   Insomnia   Sensorineural hearing loss     Past Surgical History   Bilateral knee surgery. Denies any hx of cardiac or vascular stenting.     Prior to Admission Medications   Prior to Admission Medications   Prescriptions Last Dose Informant Patient Reported? Taking?   diclofenac (VOLTAREN) 1 % topical gel PRN Self Yes Yes   Sig: Apply 2 g topically 4 times daily as needed for moderate pain   lisinopril-hydrochlorothiazide (ZESTORETIC) 20-12.5 MG tablet 6/20/2023 at 0900 Self Yes Yes   Sig: Take 1 tablet by mouth daily      Facility-Administered Medications: None      Review of Systems    The 10 point Review of Systems is negative other than noted in the HPI.    Social History   Prior tobacco use. Intermittent alcohol use.     Family History   Reports paternal side with CVAs, MIs.     Allergies   No Known Allergies     Physical Exam   Vital Signs: Temp: 97.9  F (36.6  C) Temp src: Oral BP: 121/88 Pulse: 75   Resp: 16 SpO2: 96 % O2 Device: None (Room air)    Weight: 0 lbs 0 oz    CONSTITUTIONAL: Pt laying in bed, dressed in hospital garb. Appears comfortable.   HEENT: Normocephalic, atraumatic. Pupils equal, round, and reactive to light. EOMI, bilat. Poor dentition noted.    CARDIOVASCULAR: RRR, no  murmurs, rubs, or extra heart sounds appreciated. Pulses +2/4 and regular in upper and lower extremities, bilaterally.   RESPIRATORY: No increased work of breathing. CTA, bilat; no wheezes, rales, or rhonchi appreciated.  GASTROINTESTINAL:  Abdomen soft, non-distended. BS auscultated in all four quadrants. Negative for tenderness to palpation.  No masses or organomegaly noted.  MUSCULOSKELETAL: Strength +5/5 in upper and lower extremities, bilaterally. No gross deformities noted. Normal muscle tone.   HEMATOLOGIC/LYMPHATIC/IMMUNOLOGIC: Negative for lower extremity edema, bilaterally.  NEUROLOGIC: Alert and oriented to person, place, and time; though some dysarthria and mild word finding issues.  strength intact. CN's II-XII grossly intact. Sensation equal and intact in upper and lower extremities, bilat. Negative pronator drift   SKIN: Warm, dry, intact.     Medical Decision Making       75 MINUTES SPENT BY ME on the date of service doing chart review, history, exam, documentation & further activities per the note.      Data     I have personally reviewed the following data over the past 24 hrs:    13.6 (H)  \   14.7   / 209     137 101 19.6 /  131 (H)   4.1 24 1.09 \       Trop: 7 BNP: N/A       TSH: N/A T4: N/A A1C: 5.9 (H)       INR:  1.01 PTT:  24   D-dimer:  N/A Fibrinogen:  N/A       Imaging results reviewed over the past 24 hrs:   Recent Results (from the past 24 hour(s))   CTA Head Neck with Contrast    Narrative    EXAM: CTA HEAD NECK W CONTRAST, CT HEAD PERFUSION W CONTRAST  LOCATION: [facility]  DATE: 6/20/2023    INDICATION: Code Stroke to evaluate for potential thrombolysis and thrombectomy. PLEASE READ IMMEDIATELY. Slurred speech, wake up symptoms  COMPARISON: None.  TECHNIQUE: Head and neck CT angiogram with IV contrast. Noncontrast head CT followed by axial helical CT images of the head and neck vessels obtained during the arterial phase of intravenous contrast  administration. Axial 2D reconstructed images and   multiplanar 3D MIP reconstructed images of the head and neck vessels were performed by the technologist. Additional CT cerebral perfusion was performed utilizing a second contrast bolus. Perfusion data were post processed with generation of standard   perfusion maps and estimation of ischemic/infarcted volumes utilizing standard threshold values. Dose reduction techniques were used. All stenosis measurements made according to NASCET criteria unless otherwise specified.  CONTRAST: Isovue-370, 75 mL IV.    FINDINGS:   NONCONTRAST HEAD CT:   INTRACRANIAL CONTENTS: No intracranial hemorrhage, extraaxial collection, or mass effect.  No CT evidence of acute infarct. Normal parenchymal attenuation. Mild generalized volume loss. No hydrocephalus.     VISUALIZED ORBITS/SINUSES/MASTOIDS: No intraorbital abnormality. No paranasal sinus mucosal disease. No middle ear or mastoid effusion.    BONES/SOFT TISSUES: No acute abnormality.    HEAD CTA:  ANTERIOR CIRCULATION: All of the major large-caliber proximal anterior circulation vessels are patent without definite aneurysm or AVM. Mild to moderate calcific atheromatous disease in both carotid siphons. Fetal-type origin of the right posterior   cerebral artery.    POSTERIOR CIRCULATION: Both intracranial vertebral arteries and the basilar artery are patent. Both posterior cerebral arteries patent. No posterior circulation aneurysm or AVM identified. Relatively balanced vertebral arteries.     DURAL VENOUS SINUSES: Expected early enhancement of the major dural venous sinuses.    NECK CTA:  RIGHT CAROTID: No measurable stenosis or dissection.    LEFT CAROTID: Somewhat irregular atheromatous disease in the proximal left ICA with a focal stenosis at the origin of the left ICA in the 60% range. At the distal end of the left carotid bulb is a more severe stenosis in the 85% range. Flow is preserved   distally. No  dissection.    VERTEBRAL ARTERIES: No focal stenosis or dissection. Balanced vertebral arteries.    AORTIC ARCH: Classic aortic arch anatomy with no significant stenosis at the origin of the great vessels.    NONVASCULAR STRUCTURES: Unremarkable.    CT PERFUSION:  PERFUSION MAPS: Symmetrical cerebral perfusion. No focal deficits in cerebral blood flow or volume to suggest ischemia/oligemia.    RAPID ANALYSIS:  CBF<30%: 0.  Tmax>6sec: 0.  Mismatch volume: 0.  Mismatch ratio: None.      Impression    IMPRESSION:   HEAD CT:  1.  No acute intracranial abnormality.    HEAD CTA:   1.  All of the major large-caliber proximal intracranial vessels are patent without definite aneurysm or AVM.  2.  Mild to moderate calcific atheromatous changes in both carotid siphons.    NECK CTA:  1.  There are 2 somewhat irregular atheromatous stenoses in the proximal left ICA. The more proximal stenosis at the origin of the left ICA is in the 60% range. At the distal aspect of the left carotid bulb is a more severe stenosis in the 85% range.   Flow is preserved distally in the left ICA without dissection changes.  2.  No significant stenosis or dissection in the right carotid system or in either vertebral artery in the neck.    CT PERFUSION:  1.  Normal cerebral perfusion.    Noncontrast head CT findings called to Dr. Fortune in the [facility] ER at 10:30 AM. CT angiogram and CT perfusion results called to Dr. Fortune at 10:48 AM on 06/20/2023.   CT Head Perfusion w Contrast - For Tier 2 Stroke    Narrative    EXAM: CTA HEAD NECK W CONTRAST, CT HEAD PERFUSION W CONTRAST  LOCATION: [facility]  DATE: 6/20/2023    INDICATION: Code Stroke to evaluate for potential thrombolysis and thrombectomy. PLEASE READ IMMEDIATELY. Slurred speech, wake up symptoms  COMPARISON: None.  TECHNIQUE: Head and neck CT angiogram with IV contrast. Noncontrast head CT followed by axial helical CT images of the head and neck vessels obtained  during the arterial phase of intravenous contrast administration. Axial 2D reconstructed images and   multiplanar 3D MIP reconstructed images of the head and neck vessels were performed by the technologist. Additional CT cerebral perfusion was performed utilizing a second contrast bolus. Perfusion data were post processed with generation of standard   perfusion maps and estimation of ischemic/infarcted volumes utilizing standard threshold values. Dose reduction techniques were used. All stenosis measurements made according to NASCET criteria unless otherwise specified.  CONTRAST: Isovue-370, 75 mL IV.    FINDINGS:   NONCONTRAST HEAD CT:   INTRACRANIAL CONTENTS: No intracranial hemorrhage, extraaxial collection, or mass effect.  No CT evidence of acute infarct. Normal parenchymal attenuation. Mild generalized volume loss. No hydrocephalus.     VISUALIZED ORBITS/SINUSES/MASTOIDS: No intraorbital abnormality. No paranasal sinus mucosal disease. No middle ear or mastoid effusion.    BONES/SOFT TISSUES: No acute abnormality.    HEAD CTA:  ANTERIOR CIRCULATION: All of the major large-caliber proximal anterior circulation vessels are patent without definite aneurysm or AVM. Mild to moderate calcific atheromatous disease in both carotid siphons. Fetal-type origin of the right posterior   cerebral artery.    POSTERIOR CIRCULATION: Both intracranial vertebral arteries and the basilar artery are patent. Both posterior cerebral arteries patent. No posterior circulation aneurysm or AVM identified. Relatively balanced vertebral arteries.     DURAL VENOUS SINUSES: Expected early enhancement of the major dural venous sinuses.    NECK CTA:  RIGHT CAROTID: No measurable stenosis or dissection.    LEFT CAROTID: Somewhat irregular atheromatous disease in the proximal left ICA with a focal stenosis at the origin of the left ICA in the 60% range. At the distal end of the left carotid bulb is a more severe stenosis in the 85% range. Flow  is preserved   distally. No dissection.    VERTEBRAL ARTERIES: No focal stenosis or dissection. Balanced vertebral arteries.    AORTIC ARCH: Classic aortic arch anatomy with no significant stenosis at the origin of the great vessels.    NONVASCULAR STRUCTURES: Unremarkable.    CT PERFUSION:  PERFUSION MAPS: Symmetrical cerebral perfusion. No focal deficits in cerebral blood flow or volume to suggest ischemia/oligemia.    RAPID ANALYSIS:  CBF<30%: 0.  Tmax>6sec: 0.  Mismatch volume: 0.  Mismatch ratio: None.      Impression    IMPRESSION:   HEAD CT:  1.  No acute intracranial abnormality.    HEAD CTA:   1.  All of the major large-caliber proximal intracranial vessels are patent without definite aneurysm or AVM.  2.  Mild to moderate calcific atheromatous changes in both carotid siphons.    NECK CTA:  1.  There are 2 somewhat irregular atheromatous stenoses in the proximal left ICA. The more proximal stenosis at the origin of the left ICA is in the 60% range. At the distal aspect of the left carotid bulb is a more severe stenosis in the 85% range.   Flow is preserved distally in the left ICA without dissection changes.  2.  No significant stenosis or dissection in the right carotid system or in either vertebral artery in the neck.    CT PERFUSION:  1.  Normal cerebral perfusion.    Noncontrast head CT findings called to Dr. Fortune in the [facility] ER at 10:30 AM. CT angiogram and CT perfusion results called to Dr. Fortune at 10:48 AM on 06/20/2023.   MR Brain w/o & w Contrast    Narrative    EXAM: MR BRAIN W/O and W CONTRAST  LOCATION: [facility]  DATE: 6/20/2023    INDICATION: cva slurred speech  COMPARISON: Head and neck CTA 06/20/2023.  CONTRAST: 7ml Gadavist  TECHNIQUE: Routine multiplanar multisequence head MRI without and with intravenous contrast.    FINDINGS:  INTRACRANIAL CONTENTS: No acute or subacute infarct. No mass, acute hemorrhage, or extra-axial fluid collections. Few scattered  nonspecific T2/FLAIR hyperintensities within the cerebral white matter most consistent with minimal chronic microvascular   ischemic change. Normal ventricles and sulci. Normal position of the cerebellar tonsils. No pathologic contrast enhancement.    SELLA: No abnormality accounting for technique.    OSSEOUS STRUCTURES/SOFT TISSUES: Normal marrow signal. The major intracranial vascular flow voids are maintained.     ORBITS: No abnormality accounting for technique.     SINUSES/MASTOIDS: No paranasal sinus mucosal disease. No middle ear or mastoid effusion.       Impression    IMPRESSION:  1.  No findings to explain patient's symptoms. No acute intracranial pathology.  2.  Minimal chronic small vessel ischemic disease.

## 2023-06-20 NOTE — PROGRESS NOTES
I discussed the case with Dr. Cordero.  He does not feel strongly that this was a transient ischemic attack and that this was asymptomatic left carotid stenosis.  I discussed this with the patient.  He does have severe stenosis of the left internal carotid artery.  He can either have it done during this hospitalization which will be on the evening of the 22nd of this month or he can go home and come back and have it done on a semielective basis.  I discussed this with him, his daughter and his son-in-law.  They will decide and let us know.  I am not going to make him n.p.o. tonight.

## 2023-06-20 NOTE — PHARMACY-ADMISSION MEDICATION HISTORY
Pharmacist Admission Medication History    Admission medication history is complete. The information provided in this note is only as accurate as the sources available at the time of the update.    Medication reconciliation/reorder completed by provider prior to medication history? No    Information Source(s): Patient and CareEverywhere/SureScripts via in-person    Pertinent Information: Pt states he does not take a daily baby Aspirin (was listed in care everywhere) - He only takes one medication for blood pressure control    Changes made to PTA medication list:    Added: all medications added     Deleted: None    Changed: None        Allergies reviewed with patient and updates made in EHR: Done by RN    Medication History Completed By: Kelley Siddiqui [facility] 6/20/2023 2:54 PM    Prior to Admission medications    Medication Sig Last Dose Taking? Auth Provider Long Term End Date   diclofenac (VOLTAREN) 1 % topical gel Apply 2 g topically 4 times daily as needed for moderate pain PRN Yes Unknown, Entered By History     lisinopril-hydrochlorothiazide (ZESTORETIC) 20-12.5 MG tablet Take 1 tablet by mouth daily 6/20/2023 at 0900 Yes Unknown, Entered By History Yes

## 2023-06-20 NOTE — PROGRESS NOTES
"Recommendations:  - Use orderset: \"Ischemic Stroke Routine Admission\" or \"Ischemic Stroke No Thrombolytics/No Thrombectomy ICU Admission\"  -NPO given dysarthria. If patient not able to be cleared soon by SPT to receive  mg daily and plavix 75 mg daily then would recommend NGT for medication administration.   -vascular surgery consult (spoke with ED provider and they do not have beds at [facility] so he will be transferring to [facility])  -goal SBP <220 x first 24 hours post-stroke, avoid hypotension or sudden drops in BP  -Neuro checks and vitals every 4 hours  -TTE (with bubble study if 60 yrs or less)   -PT/OT/SPT, NPO-Dysphagia screen  -ECG/troponins x 3, telemetry  -blood glucose monitoring, check Hgb A1c (goal <7%)  -Lipitor 80 mg daily, check Lipid panel (titrate to goal LDL 40-70), <40 increases risk of ICH  -Euthermia, euglycemia, eunatremia   -Stroke Education  -Stroke Class per [facility] ([facility])    "

## 2023-06-20 NOTE — PLAN OF CARE
Goal Outcome Evaluation:               Patient here with dysarthric speech. Alert, oriented times 4. Neuro's intact, except slightly dysarthric. VSS. Tele NSR. Up with SBA, ambulates to bathroom. On a level 6 soft diet, good appetite. Vascular, Neurology and hospitalist are following.

## 2023-06-21 ENCOUNTER — APPOINTMENT (OUTPATIENT)
Dept: SPEECH THERAPY | Facility: CLINIC | Age: 68
DRG: 037 | End: 2023-06-21
Attending: HOSPITALIST
Payer: COMMERCIAL

## 2023-06-21 ENCOUNTER — APPOINTMENT (OUTPATIENT)
Dept: OCCUPATIONAL THERAPY | Facility: CLINIC | Age: 68
DRG: 037 | End: 2023-06-21
Attending: STUDENT IN AN ORGANIZED HEALTH CARE EDUCATION/TRAINING PROGRAM
Payer: COMMERCIAL

## 2023-06-21 LAB
ANION GAP SERPL CALCULATED.3IONS-SCNC: 11 MMOL/L (ref 7–15)
ATRIAL RATE - MUSE: 85 BPM
BUN SERPL-MCNC: 17.6 MG/DL (ref 8–23)
CALCIUM SERPL-MCNC: 9.3 MG/DL (ref 8.8–10.2)
CHLORIDE SERPL-SCNC: 100 MMOL/L (ref 98–107)
CHOLEST SERPL-MCNC: 190 MG/DL
CREAT SERPL-MCNC: 1.09 MG/DL (ref 0.67–1.17)
DEPRECATED HCO3 PLAS-SCNC: 25 MMOL/L (ref 22–29)
DIASTOLIC BLOOD PRESSURE - MUSE: NORMAL MMHG
ERYTHROCYTE [DISTWIDTH] IN BLOOD BY AUTOMATED COUNT: 13.2 % (ref 10–15)
FOLATE SERPL-MCNC: 7.1 NG/ML (ref 4.6–34.8)
GFR SERPL CREATININE-BSD FRML MDRD: 75 ML/MIN/1.73M2
GLUCOSE BLDC GLUCOMTR-MCNC: 102 MG/DL (ref 70–99)
GLUCOSE SERPL-MCNC: 87 MG/DL (ref 70–99)
HCT VFR BLD AUTO: 42.7 % (ref 40–53)
HDLC SERPL-MCNC: 45 MG/DL
HGB BLD-MCNC: 14.2 G/DL (ref 13.3–17.7)
HIV 1+2 AB+HIV1 P24 AG SERPL QL IA: NONREACTIVE
INTERPRETATION ECG - MUSE: NORMAL
LDLC SERPL CALC-MCNC: 131 MG/DL
MCH RBC QN AUTO: 28.1 PG (ref 26.5–33)
MCHC RBC AUTO-ENTMCNC: 33.3 G/DL (ref 31.5–36.5)
MCV RBC AUTO: 84 FL (ref 78–100)
NONHDLC SERPL-MCNC: 145 MG/DL
P AXIS - MUSE: 67 DEGREES
PLATELET # BLD AUTO: 192 10E3/UL (ref 150–450)
POTASSIUM SERPL-SCNC: 3.9 MMOL/L (ref 3.4–5.3)
PR INTERVAL - MUSE: 174 MS
QRS DURATION - MUSE: 80 MS
QT - MUSE: 388 MS
QTC - MUSE: 461 MS
R AXIS - MUSE: -33 DEGREES
RBC # BLD AUTO: 5.06 10E6/UL (ref 4.4–5.9)
SODIUM SERPL-SCNC: 136 MMOL/L (ref 136–145)
SYSTOLIC BLOOD PRESSURE - MUSE: NORMAL MMHG
T AXIS - MUSE: 43 DEGREES
T PALLIDUM AB SER QL: NONREACTIVE
TRIGL SERPL-MCNC: 72 MG/DL
TSH SERPL DL<=0.005 MIU/L-ACNC: 0.58 UIU/ML (ref 0.3–4.2)
VENTRICULAR RATE- MUSE: 85 BPM
VIT B12 SERPL-MCNC: 738 PG/ML (ref 232–1245)
WBC # BLD AUTO: 5.7 10E3/UL (ref 4–11)

## 2023-06-21 PROCEDURE — 82607 VITAMIN B-12: CPT | Performed by: STUDENT IN AN ORGANIZED HEALTH CARE EDUCATION/TRAINING PROGRAM

## 2023-06-21 PROCEDURE — 36415 COLL VENOUS BLD VENIPUNCTURE: CPT | Performed by: STUDENT IN AN ORGANIZED HEALTH CARE EDUCATION/TRAINING PROGRAM

## 2023-06-21 PROCEDURE — 250N000013 HC RX MED GY IP 250 OP 250 PS 637: Performed by: PHYSICIAN ASSISTANT

## 2023-06-21 PROCEDURE — 87389 HIV-1 AG W/HIV-1&-2 AB AG IA: CPT | Performed by: STUDENT IN AN ORGANIZED HEALTH CARE EDUCATION/TRAINING PROGRAM

## 2023-06-21 PROCEDURE — 86780 TREPONEMA PALLIDUM: CPT | Performed by: STUDENT IN AN ORGANIZED HEALTH CARE EDUCATION/TRAINING PROGRAM

## 2023-06-21 PROCEDURE — 84425 ASSAY OF VITAMIN B-1: CPT | Performed by: STUDENT IN AN ORGANIZED HEALTH CARE EDUCATION/TRAINING PROGRAM

## 2023-06-21 PROCEDURE — 120N000001 HC R&B MED SURG/OB

## 2023-06-21 PROCEDURE — 85027 COMPLETE CBC AUTOMATED: CPT | Performed by: PHYSICIAN ASSISTANT

## 2023-06-21 PROCEDURE — 97165 OT EVAL LOW COMPLEX 30 MIN: CPT | Mod: GO

## 2023-06-21 PROCEDURE — 99231 SBSQ HOSP IP/OBS SF/LOW 25: CPT

## 2023-06-21 PROCEDURE — 80048 BASIC METABOLIC PNL TOTAL CA: CPT | Performed by: PHYSICIAN ASSISTANT

## 2023-06-21 PROCEDURE — 82746 ASSAY OF FOLIC ACID SERUM: CPT | Performed by: STUDENT IN AN ORGANIZED HEALTH CARE EDUCATION/TRAINING PROGRAM

## 2023-06-21 PROCEDURE — 84443 ASSAY THYROID STIM HORMONE: CPT | Performed by: STUDENT IN AN ORGANIZED HEALTH CARE EDUCATION/TRAINING PROGRAM

## 2023-06-21 PROCEDURE — 250N000013 HC RX MED GY IP 250 OP 250 PS 637: Performed by: HOSPITALIST

## 2023-06-21 PROCEDURE — 92526 ORAL FUNCTION THERAPY: CPT | Mod: GN

## 2023-06-21 PROCEDURE — 97530 THERAPEUTIC ACTIVITIES: CPT | Mod: GO

## 2023-06-21 PROCEDURE — 99232 SBSQ HOSP IP/OBS MODERATE 35: CPT | Performed by: HOSPITALIST

## 2023-06-21 RX ORDER — LISINOPRIL 10 MG/1
10 TABLET ORAL DAILY
Status: DISCONTINUED | OUTPATIENT
Start: 2023-06-21 | End: 2023-06-23

## 2023-06-21 RX ADMIN — LISINOPRIL 10 MG: 10 TABLET ORAL at 09:31

## 2023-06-21 RX ADMIN — CLOPIDOGREL BISULFATE 75 MG: 75 TABLET ORAL at 08:08

## 2023-06-21 RX ADMIN — ASPIRIN 325 MG ORAL TABLET 325 MG: 325 PILL ORAL at 08:08

## 2023-06-21 RX ADMIN — ATORVASTATIN CALCIUM 80 MG: 80 TABLET, FILM COATED ORAL at 21:31

## 2023-06-21 ASSESSMENT — ACTIVITIES OF DAILY LIVING (ADL)
ADLS_ACUITY_SCORE: 28
ADLS_ACUITY_SCORE: 22
ADLS_ACUITY_SCORE: 28
ADLS_ACUITY_SCORE: 22

## 2023-06-21 NOTE — PROGRESS NOTES
[facility]    Hospitalist Progress Note    Date of Service (when I saw the patient): 06/21/2023    Assessment & Plan   Rich Moffett is a 67 year old male with below PMHX who presented to [facility] ED 6/20/23 after acute onset dysarthria with concern for acute CVA in the setting of L ICA stenosis. Transferred for Neurology and Vascular Surgery consultation.     Speech disturbances - family reports dysarthria, not clearly aphasic   Severe left ICA stenosis - likely asymptomatic as presentation not concerning for TIA or stroke as evidenced on imaging   Underlying neurocognitive disorder     Dysarthria, no aphasia. Resolved.  suspect acute CVA (low suspicion per neurology).   Severe L ICA stenosis: likely asymptomatic and  not the source of dysarthria per neuro.No hx of CVA.  Neg imaging for CVA.  A1C 5.9, trop 7.   Vascular surgery consulted. Plan to do CEA of L ICA either in this hospitalization vs a later date (semielctive). States he wants to have it done in this hospitalization.  - Per Cont DAPT until carotid intervention and postop antiplatelet regimen per vascular surgery.  - Resume PTA BP meds.  - Follow pending labs.  - Awaiting further surgical plan.    Suspected cognitive issues. Oriented x3.:   - Follow dementia labs.  - Follow up with neurology.     Leukocytosis: WBC 13.6 on admission. Suspect reactive in the setting of above. No infectious sx reported on admission.   Resolved.  - Monitor     Described orthostasis: Reports intermittent bouts of positional dizziness at home. No episodes of true syncope, but has fallen because of this.   - Check orthostatics      Hypertension:  - Resume PTA Lisinopril with holding parameters.  - Hold hydrochlorothiazide.     Chronic, stable medical conditions, not on meds PTA  GERD  Insomnia  Sensorineural hearing loss        Diet: Combination Diet Soft and Bite Sized Diet (level 6); Thin Liquids (level 0) (no straws); Low Saturated Fat  Diet  DVT Prophylaxis: Pneumatic Compression Devices  Rodriguez Catheter: Not present  Lines: None     Cardiac Monitoring: ACTIVE order. Indication: Stroke, acute (48 hours)  Code Status: Full Code          Disposition Plan     Expected Discharge Date:  pending surgical plans.              Izabel Hardy MD    Interval History   Awake and oriented x3. Denies pain. Denies dizziness. Denies headache.        Physical Exam   Temp: 98  F (36.7  C) Temp src: Oral BP: (!) 140/82 Pulse: 64   Resp: 16 SpO2: 93 % O2 Device: None (Room air)    There were no vitals filed for this visit.  Vital Signs with Ranges  Temp:  [97.9  F (36.6  C)-98.6  F (37  C)] 98  F (36.7  C)  Pulse:  [61-89] 64  Resp:  [16-34] 16  BP: (115-208)/() 140/82  SpO2:  [91 %-96 %] 93 %  I/O last 3 completed shifts:  In: 240 [P.O.:240]  Out: -     CONSTITUTIONAL: Pt laying in bed, dressed in hospital garb. Appears comfortable.   HEENT: Normocephalic, atraumatic. Pupils equal, round, and reactive to light. EOMI, bilat. Poor dentition noted.    CARDIOVASCULAR: RRR..   RESPIRATORY: No increased work of breathing. CTA, bilat; no wheezes, rales, or rhonchi appreciated.  GASTROINTESTINAL:  Abdomen soft, non-distended. BS auscultated in all four quadrants. Negative for tenderness to palpation.  No masses or organomegaly noted.   HEMATOLOGIC/LYMPHATIC/IMMUNOLOGIC: Negative for lower extremity edema, bilaterally.  NEUROLOGIC: neg.     Medications     - MEDICATION INSTRUCTIONS -       - MEDICATION INSTRUCTIONS -         aspirin  325 mg Oral Daily     atorvastatin  80 mg Oral QPM     clopidogrel  75 mg Oral Daily     sodium chloride (PF)  3 mL Intracatheter Q8H       Data   Recent Labs   Lab 06/20/23  2155 06/20/23  1736 06/20/23  1036   WBC  --   --  13.6*   HGB  --   --  14.7   MCV  --   --  85   PLT  --   --  209   INR  --   --  1.01   NA  --   --  137   POTASSIUM  --   --  4.1   CHLORIDE  --   --  101   CO2  --   --  24   BUN  --   --  19.6   CR  --   --  1.09    ANIONGAP  --   --  12   JASMINE  --   --  9.8   GLC 96 102* 131*       Recent Results (from the past 24 hour(s))   CTA Head Neck with Contrast    Narrative    EXAM: CTA HEAD NECK W CONTRAST, CT HEAD PERFUSION W CONTRAST  LOCATION: [facility]  DATE: 6/20/2023    INDICATION: Code Stroke to evaluate for potential thrombolysis and thrombectomy. PLEASE READ IMMEDIATELY. Slurred speech, wake up symptoms  COMPARISON: None.  TECHNIQUE: Head and neck CT angiogram with IV contrast. Noncontrast head CT followed by axial helical CT images of the head and neck vessels obtained during the arterial phase of intravenous contrast administration. Axial 2D reconstructed images and   multiplanar 3D MIP reconstructed images of the head and neck vessels were performed by the technologist. Additional CT cerebral perfusion was performed utilizing a second contrast bolus. Perfusion data were post processed with generation of standard   perfusion maps and estimation of ischemic/infarcted volumes utilizing standard threshold values. Dose reduction techniques were used. All stenosis measurements made according to NASCET criteria unless otherwise specified.  CONTRAST: Isovue-370, 75 mL IV.    FINDINGS:   NONCONTRAST HEAD CT:   INTRACRANIAL CONTENTS: No intracranial hemorrhage, extraaxial collection, or mass effect.  No CT evidence of acute infarct. Normal parenchymal attenuation. Mild generalized volume loss. No hydrocephalus.     VISUALIZED ORBITS/SINUSES/MASTOIDS: No intraorbital abnormality. No paranasal sinus mucosal disease. No middle ear or mastoid effusion.    BONES/SOFT TISSUES: No acute abnormality.    HEAD CTA:  ANTERIOR CIRCULATION: All of the major large-caliber proximal anterior circulation vessels are patent without definite aneurysm or AVM. Mild to moderate calcific atheromatous disease in both carotid siphons. Fetal-type origin of the right posterior   cerebral artery.    POSTERIOR CIRCULATION: Both  intracranial vertebral arteries and the basilar artery are patent. Both posterior cerebral arteries patent. No posterior circulation aneurysm or AVM identified. Relatively balanced vertebral arteries.     DURAL VENOUS SINUSES: Expected early enhancement of the major dural venous sinuses.    NECK CTA:  RIGHT CAROTID: No measurable stenosis or dissection.    LEFT CAROTID: Somewhat irregular atheromatous disease in the proximal left ICA with a focal stenosis at the origin of the left ICA in the 60% range. At the distal end of the left carotid bulb is a more severe stenosis in the 85% range. Flow is preserved   distally. No dissection.    VERTEBRAL ARTERIES: No focal stenosis or dissection. Balanced vertebral arteries.    AORTIC ARCH: Classic aortic arch anatomy with no significant stenosis at the origin of the great vessels.    NONVASCULAR STRUCTURES: Unremarkable.    CT PERFUSION:  PERFUSION MAPS: Symmetrical cerebral perfusion. No focal deficits in cerebral blood flow or volume to suggest ischemia/oligemia.    RAPID ANALYSIS:  CBF<30%: 0.  Tmax>6sec: 0.  Mismatch volume: 0.  Mismatch ratio: None.      Impression    IMPRESSION:   HEAD CT:  1.  No acute intracranial abnormality.    HEAD CTA:   1.  All of the major large-caliber proximal intracranial vessels are patent without definite aneurysm or AVM.  2.  Mild to moderate calcific atheromatous changes in both carotid siphons.    NECK CTA:  1.  There are 2 somewhat irregular atheromatous stenoses in the proximal left ICA. The more proximal stenosis at the origin of the left ICA is in the 60% range. At the distal aspect of the left carotid bulb is a more severe stenosis in the 85% range.   Flow is preserved distally in the left ICA without dissection changes.  2.  No significant stenosis or dissection in the right carotid system or in either vertebral artery in the neck.    CT PERFUSION:  1.  Normal cerebral perfusion.    Noncontrast head CT findings called to Dr. Fortune  in the [facility] ER at 10:30 AM. CT angiogram and CT perfusion results called to Dr. Fortune at 10:48 AM on 06/20/2023.   CT Head Perfusion w Contrast - For Tier 2 Stroke    Narrative    EXAM: CTA HEAD NECK W CONTRAST, CT HEAD PERFUSION W CONTRAST  LOCATION: [facility]  DATE: 6/20/2023    INDICATION: Code Stroke to evaluate for potential thrombolysis and thrombectomy. PLEASE READ IMMEDIATELY. Slurred speech, wake up symptoms  COMPARISON: None.  TECHNIQUE: Head and neck CT angiogram with IV contrast. Noncontrast head CT followed by axial helical CT images of the head and neck vessels obtained during the arterial phase of intravenous contrast administration. Axial 2D reconstructed images and   multiplanar 3D MIP reconstructed images of the head and neck vessels were performed by the technologist. Additional CT cerebral perfusion was performed utilizing a second contrast bolus. Perfusion data were post processed with generation of standard   perfusion maps and estimation of ischemic/infarcted volumes utilizing standard threshold values. Dose reduction techniques were used. All stenosis measurements made according to NASCET criteria unless otherwise specified.  CONTRAST: Isovue-370, 75 mL IV.    FINDINGS:   NONCONTRAST HEAD CT:   INTRACRANIAL CONTENTS: No intracranial hemorrhage, extraaxial collection, or mass effect.  No CT evidence of acute infarct. Normal parenchymal attenuation. Mild generalized volume loss. No hydrocephalus.     VISUALIZED ORBITS/SINUSES/MASTOIDS: No intraorbital abnormality. No paranasal sinus mucosal disease. No middle ear or mastoid effusion.    BONES/SOFT TISSUES: No acute abnormality.    HEAD CTA:  ANTERIOR CIRCULATION: All of the major large-caliber proximal anterior circulation vessels are patent without definite aneurysm or AVM. Mild to moderate calcific atheromatous disease in both carotid siphons. Fetal-type origin of the right posterior   cerebral  artery.    POSTERIOR CIRCULATION: Both intracranial vertebral arteries and the basilar artery are patent. Both posterior cerebral arteries patent. No posterior circulation aneurysm or AVM identified. Relatively balanced vertebral arteries.     DURAL VENOUS SINUSES: Expected early enhancement of the major dural venous sinuses.    NECK CTA:  RIGHT CAROTID: No measurable stenosis or dissection.    LEFT CAROTID: Somewhat irregular atheromatous disease in the proximal left ICA with a focal stenosis at the origin of the left ICA in the 60% range. At the distal end of the left carotid bulb is a more severe stenosis in the 85% range. Flow is preserved   distally. No dissection.    VERTEBRAL ARTERIES: No focal stenosis or dissection. Balanced vertebral arteries.    AORTIC ARCH: Classic aortic arch anatomy with no significant stenosis at the origin of the great vessels.    NONVASCULAR STRUCTURES: Unremarkable.    CT PERFUSION:  PERFUSION MAPS: Symmetrical cerebral perfusion. No focal deficits in cerebral blood flow or volume to suggest ischemia/oligemia.    RAPID ANALYSIS:  CBF<30%: 0.  Tmax>6sec: 0.  Mismatch volume: 0.  Mismatch ratio: None.      Impression    IMPRESSION:   HEAD CT:  1.  No acute intracranial abnormality.    HEAD CTA:   1.  All of the major large-caliber proximal intracranial vessels are patent without definite aneurysm or AVM.  2.  Mild to moderate calcific atheromatous changes in both carotid siphons.    NECK CTA:  1.  There are 2 somewhat irregular atheromatous stenoses in the proximal left ICA. The more proximal stenosis at the origin of the left ICA is in the 60% range. At the distal aspect of the left carotid bulb is a more severe stenosis in the 85% range.   Flow is preserved distally in the left ICA without dissection changes.  2.  No significant stenosis or dissection in the right carotid system or in either vertebral artery in the neck.    CT PERFUSION:  1.  Normal cerebral  perfusion.    Noncontrast head CT findings called to Dr. Fortune in the [facility] ER at 10:30 AM. CT angiogram and CT perfusion results called to Dr. Fortune at 10:48 AM on 2023.   MR Brain w/o & w Contrast    Narrative    EXAM: MR BRAIN W/O and W CONTRAST  LOCATION: [facility]  DATE: 2023    INDICATION: cva slurred speech  COMPARISON: Head and neck CTA 2023.  CONTRAST: 7ml Gadavist  TECHNIQUE: Routine multiplanar multisequence head MRI without and with intravenous contrast.    FINDINGS:  INTRACRANIAL CONTENTS: No acute or subacute infarct. No mass, acute hemorrhage, or extra-axial fluid collections. Few scattered nonspecific T2/FLAIR hyperintensities within the cerebral white matter most consistent with minimal chronic microvascular   ischemic change. Normal ventricles and sulci. Normal position of the cerebellar tonsils. No pathologic contrast enhancement.    SELLA: No abnormality accounting for technique.    OSSEOUS STRUCTURES/SOFT TISSUES: Normal marrow signal. The major intracranial vascular flow voids are maintained.     ORBITS: No abnormality accounting for technique.     SINUSES/MASTOIDS: No paranasal sinus mucosal disease. No middle ear or mastoid effusion.       Impression    IMPRESSION:  1.  No findings to explain patient's symptoms. No acute intracranial pathology.  2.  Minimal chronic small vessel ischemic disease.   Echocardiogram Complete - For age > 60 yrs   Result Value    LVEF  60-65%    Narrative    [id number]  [id number]  [id number]  [id number]^MICKEY^NISHA^JASON     [facility]  Echocardiography Laboratory  [address] [place] [address]     Name: HELEN CAMPBELL  MRN: [id number]  : 1955  Study Date: 2023 03:09 PM  Age: 67 yrs  Gender: Male  Patient Location: [facility]  Reason For Study: Cerebrovascular Incident  Ordering Physician: NISHA AREVALO  Referring Physician: JP FORTUNE  Performed By: Odell  Madan     BSA: 1.8 m2  Height: 66 in  Weight: 155 lb  HR: 72  BP: 121/88 mmHg  ______________________________________________________________________________  Procedure  Complete Portable Echo Adult.  ______________________________________________________________________________  Interpretation Summary     Left ventricular systolic function is normal.  The visual ejection fraction is 60-65%.  There is mild concentric left ventricular hypertrophy.  The right ventricle is normal in structure, function and size.  No significant valve dysfunction.  The inferior vena cava was normal in size with preserved respiratory  variability.  There is no pericardial effusion.     No prior study for comparison.  ______________________________________________________________________________  Left Ventricle  The left ventricle is normal in size. There is mild concentric left  ventricular hypertrophy. Left ventricular systolic function is normal. The  visual ejection fraction is 60-65%. Normal left ventricular wall motion.     Right Ventricle  The right ventricle is normal in structure, function and size.     Atria  Normal left atrial size. Right atrial size is normal.     Mitral Valve  There is mild mitral annular calcification.     Tricuspid Valve  The tricuspid valve is normal in structure and function. The right ventricular  systolic pressure is approximated at 35mmHg plus the right atrial pressure.     Aortic Valve  There is mild trileaflet aortic sclerosis.     Pulmonic Valve  The pulmonic valve is normal in structure and function.     Vessels  The aortic root is normal size. Normal size ascending aorta. The inferior vena  cava was normal in size with preserved respiratory variability.     Pericardium  There is no pericardial effusion.     ______________________________________________________________________________  MMode/2D Measurements & Calculations  IVSd: 1.1 cm  LVIDd: 3.8 cm  LVIDs: 2.2 cm  LVPWd: 1.1 cm  FS: 42.1  %  LV mass(C)d: 134.7 grams  LV mass(C)dI: 75.0 grams/m2     Ao root diam: 3.3 cm  LA dimension: 3.5 cm  asc Aorta Diam: 3.4 cm  LA/Ao: 1.1  LVOT diam: 2.0 cm  LVOT area: 3.1 cm2  LA Volume (BP): 36.3 ml  LA Volume Index (BP): 20.3 ml/m2  RWT: 0.58  TAPSE: 4.0 cm     Doppler Measurements & Calculations  MV E max salvador: 99.0 cm/sec  MV A max salvador: 71.8 cm/sec  MV E/A: 1.4     MV dec slope: 651.0 cm/sec2  MV dec time: 0.15 sec  PA acc time: 0.12 sec  TR max salvador: 272.3 cm/sec  TR max P.0 mmHg  E/E' av.0  Lateral E/e': 7.2  Medial E/e': 8.8  RV S Salvador: 16.5 cm/sec     ______________________________________________________________________________  Report approved by: Fran Sin 2023 04:07 PM

## 2023-06-21 NOTE — PROGRESS NOTES
VASCULAR SURGERY PROGRESS NOTE    Subjective:  Resting comfortably in bed. Alert and oriented x4, dysarthric speech-improving. Tolerating diet well. No issues overnight.    Objective:  Intake/Output Summary (Last 24 hours) at 6/21/2023 0803  Last data filed at 6/20/2023 1817  Gross per 24 hour   Intake 240 ml   Output --   Net 240 ml     PHYSICAL EXAM:  /82 (BP Location: Left arm, Patient Position: Supine)   Pulse 59   Temp 97.8  F (36.6  C) (Oral)   Resp 18   SpO2 97%   Alert and oriented x4  No facial droop, dysarthric speech, 5/5 strength bilaterally      ASSESSMENT:  Rich Moffett is a 67 year old male who came in for acute onset of dysarthria-found to have severe stenosis of the left carotid bulb and distal ICA.     PLAN:  -patient would like to proceed with left CEA this admission. Is aware that this would likely take place on 6/22 possibly 6/23 depending on OR availability  -NPO day of surgery, continue all medications  -continue all other plan of care  -will update chart once OR timing is set    Leticia Davis, NP  VASCULAR SURGERY

## 2023-06-21 NOTE — CONSULTS
[facility]    Stroke Consult Note    Reason for Consult:  Concern for TIA     Chief Complaint: Speech disturbances     HPI  Rich Moffett is a 67 year old male with history of HTN, GERD who presented with transient aphasia/dysarthria.     At baseline, patient lives with his 5-year old son and 40-year old God son. He has some baseline cognitive issues/ short term memory difficulties since the past 2 years - he walks unassisted, independent with ADLs, takes care of cooking and finances and has never driven. He reportedly has chronic stuttering speech and has seen speech therapy as a kid.     Patient reports not feeling well and had headaches since yesterday afternoon. On waking up this morning, he did not feel right and was texting his daughter that he is going to call 911. All these messages were clearly comprehensible; grammar was slightly off but that's his baseline. His son in law visited and noted him to have some speech disturbances & possibly left arm/leg weakness. He appeared to be slow in finding words but was able to find the words to speak, could comprehend fully and had slurry speech. His speech symptoms have gradually improved but family reports that he has not returned to baseline yet. He noted generalized weakness/inability to move and denies any focal symptoms.     On exam, his speech appears to be stuttering. Naming, comprehension and repetition impaired. No dysarthria. No other focal deficits.     TIA Evaluation Summarized    MRI and/or Head CT MRI BRAIN 6/20/2023  1.  No findings to explain patient's symptoms. No acute intracranial pathology.  2.  Minimal chronic small vessel ischemic disease.   Intracranial Vasculature HEAD CTA 6/20/2023  1.  All of the major large-caliber proximal intracranial vessels are patent without definite aneurysm or AVM.  2.  Mild to moderate calcific atheromatous changes in both carotid siphons.   Cervical Vasculature NECK CTA  6/20/2023  1.  There are 2 somewhat irregular atheromatous stenoses in the proximal left ICA. The more proximal stenosis at the origin of the left ICA is in the 60% range. At the distal aspect of the left carotid bulb is a more severe stenosis in the 85% range.   Flow is preserved distally in the left ICA without dissection changes.  2.  No significant stenosis or dissection in the right carotid system or in either vertebral artery in the neck.     Echocardiogram TTE 6/20/2023  Left ventricular systolic function is normal.  The visual ejection fraction is 60-65%.  There is mild concentric left ventricular hypertrophy.  The right ventricle is normal in structure, function and size.  No significant valve dysfunction.  The inferior vena cava was normal in size with preserved respiratory variability.  There is no pericardial effusion.   EKG/Telemetry Normal sinus rhythm   Other Testing      LDL No lab value available in past 30 days   A1C 6/20/2023: 5.9 %     Impression  Speech disturbances - family reports dysarthria, not clearly aphasic   Severe left ICA stenosis - likely asymptomatic as presentation not concerning for TIA or stroke as evidenced on imaging   Underlying neurocognitive disorder    Recommendations  - Discussed with family and Dr. Whitley regarding my impression. Given low suspicion for TIA, he does not need urgent carotid revascularization and can have CEA done electively due to severe left ICA stenosis. Ok to continue DAPT until carotid intervention and postop antiplatelet regimen per vascular surgery.   - In addition, I would recommend general neurology follow up for further workup of his gradually worsening cognitive issues. Will obtain basic dementia labs, MOCA/SLUMS score this admission and he will need further evaluation with neuropsychological testing +/- additional workup per his outpatient neurologist.     Thank you for the consult. Stroke neurology will sign off. Please contact us with questions  "or concerns.     Alan Cordero MD       Department of Neurology       Securely message with the Vocera Web Console (learn more here)   To page me or covering stroke neurology team member, click here: AMCOM  Choose \"On Call\" tab at top, then select \"NEUROLOGY/ALL SITES\" from middle drop-down box, press Enter, then look for \"stroke\" or \"telestroke\" for your site.    _____________________________________________________     Past Medical History   Hypertension  GERD  Sensorineural hearing loss    Past Surgical History   Bilateral knee surgery  Left elbow surgery    Medications   Home Meds  Prior to Admission medications    Medication Sig Start Date End Date Taking? Authorizing Provider   diclofenac (VOLTAREN) 1 % topical gel Apply 2 g topically 4 times daily as needed for moderate pain   Yes Unknown, Entered By History   lisinopril-hydrochlorothiazide (ZESTORETIC) 20-12.5 MG tablet Take 1 tablet by mouth daily   Yes Unknown, Entered By History       Scheduled Meds    aspirin  325 mg Oral Daily     atorvastatin  80 mg Oral QPM     [START ON 6/21/2023] clopidogrel  75 mg Oral Daily     sodium chloride (PF)  3 mL Intracatheter Q8H       Infusion Meds    - MEDICATION INSTRUCTIONS -       - MEDICATION INSTRUCTIONS -         PRN Meds  lidocaine 4%, lidocaine (buffered or not buffered), - MEDICATION INSTRUCTIONS -, - MEDICATION INSTRUCTIONS -, ondansetron **OR** ondansetron, sodium chloride (PF)    Allergies   No Known Allergies     Family History   No family history of stroke or heart attacks    Social History    He lives with his son and godson. Denies tobacco, alcohol or illicit drug use.     Review of Systems   The 10 point Review of Systems is negative other than noted in the HPI or here.        PHYSICAL EXAMINATION   Temp:  [97.9  F (36.6  C)-98.6  F (37  C)] 98.6  F (37  C)  Pulse:  [64-89] 64  Resp:  [16-34] 16  BP: (115-208)/() 115/58  SpO2:  [91 %-96 %] 95 %    General Exam  General:  No " acute distress. However, patient appeared to be restless  Cardio:  RRR  Pulmonary:  no respiratory distress  Extremities:  no edema    Neuro Exam  Mental Status:  alert, oriented x 3, follows commands, speech clear and fluent, naming and repetition normal. Appears to be stuttering  Cranial Nerves:  visual fields intact, PERRL, EOMI with normal smooth pursuit, facial sensation intact and symmetric, facial movements symmetric, hearing not formally tested but intact to conversation, palate elevation symmetric and uvula midline, no dysarthria, shoulder shrug strong bilaterally, tongue protrusion midline  Motor:  normal muscle tone and bulk, no abnormal movements, able to move all limbs spontaneously, strength 5/5 throughout upper and lower extremities, no pronator drift  Reflexes:  toes down-going  Sensory:  light touch sensation intact and symmetric throughout upper and lower extremities, no extinction on double simultaneous stimulation   Coordination:  normal finger-to-nose and heel-to-shin bilaterally without dysmetria  Station/Gait:  deferred    Dysphagia Screen  Per Nursing    Stroke Scales    NIHSS  1a. Level of Consciousness 0-->Alert, keenly responsive   1b. LOC Questions 0-->Answers both questions correctly   1c. LOC Commands 0-->Performs both tasks correctly   2.   Best Gaze 0-->Normal   3.   Visual 0-->No visual loss   4.   Facial Palsy 0-->Normal symmetrical movements   5a. Motor Arm, Left 0-->No drift, limb holds 90 (or 45) degrees for full 10 secs   5b. Motor Arm, Right 0-->No drift, limb holds 90 (or 45) degrees for full 10 secs   6a. Motor Leg, Left 0-->No drift, leg holds 30 degree position for full 5 secs   6b. Motor Leg, right 0-->No drift, leg holds 30 degree position for full 5 secs   7.   Limb Ataxia 0-->Absent   8.   Sensory 0-->Normal, no sensory loss   9.   Best Language 0-->No aphasia, normal   10. Dysarthria 1-->Mild-to-moderate dysarthria, patient slurs at least some words and, at worst, can  be understood with some difficulty   11. Extinction and Inattention  0-->No abnormality   Total 1 (06/20/23 2005)       Modified Grainger Score (Pre-morbid)  1-No significant disability despite symptoms     Imaging  I personally reviewed all imaging; relevant findings per HPI.    Labs Data   CBC  Recent Labs   Lab 06/20/23  1036   WBC 13.6*   RBC 5.27   HGB 14.7   HCT 44.6        Basic Metabolic Panel   Recent Labs   Lab 06/20/23  1736 06/20/23  1036 06/20/23  1010   NA  --  137  --    POTASSIUM  --  4.1  --    CHLORIDE  --  101  --    CO2  --  24  --    BUN  --  19.6  --    CR  --  1.09  --    * 131* 135*   JASMINE  --  9.8  --      Liver Panel  No results for input(s): PROTTOTAL, ALBUMIN, BILITOTAL, ALKPHOS, AST, ALT, BILIDIRECT in the last 168 hours.  INR    Recent Labs   Lab Test 06/20/23  1036   INR 1.01      Lipid Profile    Recent Labs   Lab Test 06/07/22  1007 06/13/17  0731 06/02/16  1446   CHOL 201* 210* 212*   HDL 41 43 44   * 140* 139*   TRIG 145 136 146     A1C    Recent Labs   Lab Test 06/20/23  1507   A1C 5.9*     Troponin    Recent Labs   Lab 06/20/23  1507 06/20/23  1036   CTROPT 7 7          Stroke Consult Data Data   This was a non-emergent, non-telestroke consult.  I have personally spent a total of 65 minutes providing care today, time spent in reviewing medical records and reviewing tests, examining the patient and obtaining history, coordination of care, and discussion with the patient and/or family regarding diagnostic results, prognosis, symptom management, risks and benefits of management options, and development of plan of care. Greater than 50% was spent in counseling and coordination of care.

## 2023-06-21 NOTE — UTILIZATION REVIEW
Admission Status; Secondary Review Determination     Admission Date: 6/20/2023  2:06 PM       Under the authority of the Utilization Management Committee, the utilization review process indicated a secondary review on the above patient.  The review outcome is based on review of the medical records, discussions with staff, and applying clinical experience noted on the date of the review.        (x)      Inpatient Status Appropriate - This patient's medical care is consistent with medical management for inpatient care and reasonable inpatient medical practice.       RATIONALE FOR DETERMINATION      Brief clinical presentation, information copied from the chart, abbreviated and edited for relevant content:     Rich Moffett is a 67 year old male who came in for acute onset of dysarthria-found to have severe stenosis of the left carotid bulb and distal ICA. Vascular surgery consulted and   patient would like to proceed with left CEA this admission. Plan for tomorrow or next based on OR availability. Unclear if presentation is symptomatic stenosis, but given the severity, reasonable to treat surgically.         At the time of admission with the information available to the attending physician, more than 2 nights hospital complex care was anticipated. Also, there was a risk of adverse outcome if patient was treated outpatient or observation. High intensity of services anticipated. Inpatient admission appropriate based on Medicare guidelines.       The information on this document is developed by the utilization review team in order for the business office to ensure compliance.  This only denotes the appropriateness of proper admission status and does not reflect the quality of care rendered.         The definitions of Inpatient Status and Observation Status used in making the determination above are those provided in the CMS Coverage Manual, Chapter 1 and Chapter 6, section 70.4.      Sincerely,      Hermila Baker MD    Utilization Review/ Case Management  [facility].

## 2023-06-21 NOTE — PLAN OF CARE
Pt here with Dysarthric speech. A&Ox4. Neuros are intact ex for sluured speech. VSS. Tele SB in 50s. Soft and bite sized diet, thin liquids. Takes pills whole. Up with SBA. Denies pain. Pt scoring green on the Aggression Stop Light Tool.  Discharge pending.

## 2023-06-21 NOTE — CONSULTS
06/21/23 1352 Klisch, Christine M, RN     Stroke Education Note     The following information has been reviewed with the patient and family:     1. Warning signs of stroke     2. Calling 911 if having warning signs of stroke     3. All modifiable risk factors: hypertension, CAD, atrial fib, diabetes, hypercholesterolemia, smoking, substance abuse, diet, physical inactivity, obesity, sleep apnea.     4. Patient's risk factors for stroke which include: HTN,salt intake,HLD     5. Follow-up plan for after discharge     6. Discharge medications which include: aspirin,plavix,lisinopril,lipitor     In addition, the above information was given to the patient and family in writing as a part of the [facility] Stroke Class Handout.     Learner's response to risk factors / lifestyle modification education:   Committment to change Taking steps      Christine M Klisch, RN

## 2023-06-21 NOTE — PROGRESS NOTES
"   06/21/23 1141   Appointment Info   Signing Clinician's Name / Credentials (OT) Ruma Mercedez, HOMERO   Living Environment   People in Home friend(s);child(olesya), dependent   Current Living Arrangements house   Transportation Anticipated other (see comments)  (Pt has never driven, walks around town)   Living Environment Comments Lives with son who is 5 and god son who is 40 years old.   Self-Care   Usual Activity Tolerance good   Current Activity Tolerance moderate   Regular Exercise Yes   Activity/Exercise Type walking   Exercise Amount/Frequency daily   Equipment Currently Used at Home none   Fall history within last six months no   Activity/Exercise/Self-Care Comment Pt reports indep with functional mobility and self cares. Pt cares for son who is 5 years old and has autism. Son in law states, \"he is a handful\". Pt is retired, not working. Does not drive so walks \"all over town\" per pt report. Pt responsible for IADLs for home but states, \"sometimes my ex doesn't think my cooking is good enough and she will come cook.\"   General Information   Onset of Illness/Injury or Date of Surgery 06/20/23   Referring Physician Alan Cordero MD   Additional Occupational Profile Info/Pertinent History of Current Problem Rich Moffett is a 67 year old male with below PMHX who presented to [place] ED 6/20/23 after acute onset dysarthria with concern for acute CVA in the setting of L ICA stenosis. Transferred for Neurology and Vascular Surgery consultation.   Existing Precautions/Restrictions fall   Cognitive Status Examination   Orientation Status orientation to person, place and time   Affect/Mental Status (Cognitive) WFL   Follows Commands follows one-step commands;over 90% accuracy   Cognitive Screens/Assessments   Cognitive Assessments Completed [facility] Mental Status Exam (UMS):  Total Score out of /30 26/30   [facility] Norms 21-26 equals mild neurocognitive disorder   UMS Domains assessed: " "orientation, memory, attention, executive functions   SLUMS Interpretation Pt attentive to assessment, reports memory is \"not great\" at bseline. Only points lost were in executive function with extraction. 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   Visual Perception   Visual Impairment/Limitations WFL   Sensory   Sensory Quick Adds sensation intact   Posture   Posture not impaired   Range of Motion Comprehensive   General Range of Motion no range of motion deficits identified   Strength Comprehensive (MMT)   General Manual Muscle Testing (MMT) Assessment no strength deficits identified  (5/5 strength in B UEs)   Coordination   Upper Extremity Coordination No deficits were identified   Transfers   Transfers sit-stand transfer   Transfer Comments CGA   Activities of Daily Living   BADL Assessment/Intervention lower body dressing   Lower Body Dressing Assessment/Training   [place] Level (Lower Body Dressing) supervision   Clinical Impression   Criteria for Skilled Therapeutic Interventions Met (OT) Yes, treatment indicated   OT Diagnosis impaired functional mobility and self cares   OT Problem List-Impairments impacting ADL problems related to;activity tolerance impaired;balance;mobility;strength   Assessment of Occupational Performance 1-3 Performance Deficits   Identified Performance Deficits functional mobility, home management, role as a Dad   Planned Therapy Interventions (OT) ADL retraining;transfer training;progressive activity/exercise   Clinical Decision Making Complexity (OT) low complexity   Risk & Benefits of therapy have been explained evaluation/treatment results reviewed;care plan/treatment goals reviewed   OT Total Evaluation Time   OT Eval, Low Complexity Minutes (20011) 15   OT Goals   Therapy Frequency (OT) Daily   OT Predicted Duration/Target Date for Goal Attainment 06/28/23   OT Goals Hygiene/Grooming;Lower Body Dressing;Toilet Transfer/Toileting;Home Management "   OT: Hygiene/Grooming modified independent   OT: Lower Body Dressing Modified independent   OT: Toilet Transfer/Toileting Modified independent   OT: Home Management Modified independent;with light demand household tasks;ambulatory level   Interventions   Interventions Quick Adds Therapeutic Activity   Therapeutic Activities   Therapeutic Activity Minutes (61347) 15   Symptoms noted during/after treatment none   Treatment Detail/Skilled Intervention Pt up in chair, agrees to OT. SLUMS completed, see above. Pt ambulates in room and hallway with no AD. SBA-CGA needed for slight sway with walking. Son in law present and states the walking does not look baseline for patient but it is better than previous date. Ambulates >400ft in hallway. Good tolerance. Discussed results of SLUMS assessment. Pt reports understanding. Discussed threapy to return post surgery to re assess function. All needs met at end of session.   OT Discharge Planning   OT Plan Assess TBD, g/h at sink, functional ambulation post CEA prodedure   OT Discharge Recommendation (DC Rec) home with assist;home with outpatient occupational therapy   OT Rationale for DC Rec At this time if pt were to d/c, pt would need SBA for ambulation. Pt does not drive as baseline and cares for 5 year old son. Anticipate pt to progress to mod I with hospital stay and medical intervention, will continue to assess.   OT Brief overview of current status SBA-CGA for mobility with out AD

## 2023-06-21 NOTE — PROGRESS NOTES
"CLINICAL NUTRITION SERVICES  -  ASSESSMENT NOTE    Recommendations Ordered by Registered Dietitian (RD):   Ordered Ensure chocolate at lunch   Future/Additional Recommendations:   Monitor tolerance of supplement, po intakes, wt trends   Malnutrition:   % Weight Loss:  > 5% in 1 month (severe malnutrition)  % Intake:  </= 50% for >/= 5 days (severe malnutrition)  Subcutaneous Fat Loss:  Orbital region Mild-Moderate depletion  Muscle Loss:  Temporal region Mild-Moderate depletion, Upper arm: Mild-Moderate  Fluid Retention:  None noted    Malnutrition Diagnosis: Severe malnutrition  In Context of:  Acute illness or injury     REASON FOR ASSESSMENT  Rich Moffett is a 67 year old male seen by Registered Dietitian for Admission Nutrition Risk Screen for answering \"yes\" to recently losing weight without trying (14-23 pounds).    PMH of HTN, GERD. Presents with dysarthria and concern for acute CVA. Transferred from [facility] on 6/20 for a Neurology and Vascular Surgery consultation.    NUTRITION HISTORY  Per H&P, reports intermittent dizziness at home. Has chronic stuttering speech.    No previous RD notes on nutrition history.    Spoke with patient at bedside. He said a couple months ago around the time he got a cancer diagnosis (? - do not see this in medical history) he weighed about 177 pounds and has been losing weight ever since then. Currently he weighs 155 pounds so this would be ~12% wt loss in 2 months. He said during this time he didn't have an appetite/didn't have a drive to eat much. He also noted how he has had minimal intakes in the past week d/t nausea and headaches. He mentioned not eating for multiple days in a row in the last week. Per patient normal eating is 2 meals/day (breakfast and dinner) in which he eats lots of meat and vegetables. He also mentioned how he cooks/feeds his son and even if he doesn't eat much he makes sure his son gets enough.    CURRENT NUTRITION ORDERS  Diet Order: Soft & " "Bite Sized Diet (level 6), thin liquids (level 0), Low Saturated Fat Diet    Current Intake/Tolerance:  - 100% intake of dinner last night per flowsheet  - Good appetite per RN note yesterday  - Per patient, appetite normal, great currently    NUTRITION FOCUSED PHYSICAL ASSESSMENT FOR DIAGNOSING MALNUTRITION)  Yes    ANTHROPOMETRICS  Height: 5'6\"  Weight: 70.3 kg (155 lb)  BMI: 25.02  Weight Status:  Overweight BMI 25-29.9  IBW: 64.5 kg  % IBW: 109%  Weight History: no recent wt history documented for >10 years. Pt mentioned going from 177 pounds to 155 pounds in the span of 2 months (~12% wt loss).  Wt Readings from Last 10 Encounters:   06/20/23 70.3 kg (155 lb)     LABS  Labs reviewed    MEDICATIONS  Medications reviewed    ASSESSED NUTRITION NEEDS PER APPROVED PRACTICE GUIDELINES:  Dosing Weight 64.5 kg  Estimated Energy Needs: 6594-5731 kcals (25-30 Kcal/Kg)  Justification: maintenance  Estimated Protein Needs: 65-77 grams protein (1-1.2 g pro/Kg)  Justification: preservation of lean body mass  Estimated Fluid Needs: 1 mL/kcal or per provider pending fluid status    NUTRITION DIAGNOSIS:  Inadequate oral intake related to poor appetite d/t nausea as evidenced by pt report of poor appetite and ~12% wt loss in the past couple months.    NUTRITION INTERVENTIONS  Recommendations / Nutrition Prescription  Ordered Ensure chocolate at lunch    Implementation  Nutrition education: Per Provider order if indicated   Medical Food Supplement - ordered    Nutrition Goals  Patient to consume >75% of nutritionally adequate meals and 1 supplement per day    MONITORING AND EVALUATION:  Progress towards goals will be monitored and evaluated per protocol and Practice Guidelines    More Serrano  Clinical Dietitian - [facility]            "

## 2023-06-21 NOTE — PLAN OF CARE
Pt here with Dysarthric speech. A&Ox4. Neuros are intact ex for slurred speech. VSS. Tele sinus rhythm. Regular diet, thin liquids. Takes pills whole. Up with SBA. Denies pain. Pt scoring green on the Aggression Stop Light Tool. Plan for CEA this hospital stay, schedule pending.

## 2023-06-22 LAB
ABO/RH(D): NORMAL
ANTIBODY SCREEN: NEGATIVE
ATRIAL RATE - MUSE: 75 BPM
DIASTOLIC BLOOD PRESSURE - MUSE: NORMAL MMHG
INTERPRETATION ECG - MUSE: NORMAL
P AXIS - MUSE: 58 DEGREES
PR INTERVAL - MUSE: 176 MS
QRS DURATION - MUSE: 78 MS
QT - MUSE: 400 MS
QTC - MUSE: 446 MS
R AXIS - MUSE: -25 DEGREES
SPECIMEN EXPIRATION DATE: NORMAL
SYSTOLIC BLOOD PRESSURE - MUSE: NORMAL MMHG
T AXIS - MUSE: 50 DEGREES
VENTRICULAR RATE- MUSE: 75 BPM

## 2023-06-22 PROCEDURE — 250N000013 HC RX MED GY IP 250 OP 250 PS 637: Performed by: PHYSICIAN ASSISTANT

## 2023-06-22 PROCEDURE — 120N000001 HC R&B MED SURG/OB

## 2023-06-22 PROCEDURE — 86901 BLOOD TYPING SEROLOGIC RH(D): CPT | Performed by: SURGERY

## 2023-06-22 PROCEDURE — 36415 COLL VENOUS BLD VENIPUNCTURE: CPT | Performed by: SURGERY

## 2023-06-22 PROCEDURE — 250N000013 HC RX MED GY IP 250 OP 250 PS 637: Performed by: HOSPITALIST

## 2023-06-22 PROCEDURE — 258N000003 HC RX IP 258 OP 636: Performed by: HOSPITALIST

## 2023-06-22 PROCEDURE — 86850 RBC ANTIBODY SCREEN: CPT | Performed by: SURGERY

## 2023-06-22 PROCEDURE — 99232 SBSQ HOSP IP/OBS MODERATE 35: CPT | Performed by: HOSPITALIST

## 2023-06-22 RX ORDER — SODIUM CHLORIDE 9 MG/ML
INJECTION, SOLUTION INTRAVENOUS CONTINUOUS
Status: DISCONTINUED | OUTPATIENT
Start: 2023-06-22 | End: 2023-06-22

## 2023-06-22 RX ORDER — SODIUM CHLORIDE 9 MG/ML
INJECTION, SOLUTION INTRAVENOUS CONTINUOUS
Status: DISCONTINUED | OUTPATIENT
Start: 2023-06-23 | End: 2023-06-23

## 2023-06-22 RX ADMIN — ASPIRIN 325 MG ORAL TABLET 325 MG: 325 PILL ORAL at 09:55

## 2023-06-22 RX ADMIN — SODIUM CHLORIDE: 9 INJECTION, SOLUTION INTRAVENOUS at 09:56

## 2023-06-22 RX ADMIN — ATORVASTATIN CALCIUM 80 MG: 80 TABLET, FILM COATED ORAL at 20:34

## 2023-06-22 RX ADMIN — LISINOPRIL 10 MG: 10 TABLET ORAL at 09:55

## 2023-06-22 ASSESSMENT — ACTIVITIES OF DAILY LIVING (ADL)
ADLS_ACUITY_SCORE: 28

## 2023-06-22 NOTE — PLAN OF CARE
Reason for Admission: dysarthria, confusion     Cognitive/Mentation: A/Ox 4  Neuros/CMS: Intact ex slurred speech   VS: stable .   Tele:SR .  GI: BS normoactive , passing  flatus, . Continent.  : . Continent.  Pulmonary: LS clear .  Pain: denies .     Drains/Lines: piv sl  Skin: intact   Activity: SBA.  Diet: Reg diet with thin liquids. Takes pills whole .     Therapies recs: PT OT   Discharge: Pending CEA procedure     Aggression Stoplight Tool: Green

## 2023-06-22 NOTE — PROGRESS NOTES
Vascular Surgery Note    Due to unavailability of OR time today, plan for CEA on 6/23 (11 am).     Ok to eat, NPO after MN    Leonidas Carmona MD  Fellow

## 2023-06-22 NOTE — PLAN OF CARE
Pt here with L ICA stenosis. A&Ox4. Neuros intact except for confusion & slurred speech. VSS. Tele SB. Regular diet, thin liquids. Takes pills whole. SBA. Denies pain. Pt scoring green on the Aggression Stop Light Tool. Plan for CEA tomorrow (6/23) at 11am. NPO tonight at midnight. Discharge pending.

## 2023-06-22 NOTE — PLAN OF CARE
Goal Outcome Evaluation: [id number]    Reason for Admission: dysarthria, confusion      Cognitive/Mentation: A/Ox 4  Neuros/CMS: Intact ex slurred speech   VS: stable .   Tele:SR .  GI: BS normoactive , passing  flatus, . Continent.  : . Continent.  Pulmonary: LS clear .  Pain: denies .      Drains/Lines: PIV SL  Skin: intact   Activity: SBA.  Diet: Reg diet with thin liquids. Takes pills whole .      Therapies recs: PT OT   Discharge: Pending CEA procedure      Aggression Stoplight Tool: Green

## 2023-06-22 NOTE — PROGRESS NOTES
[facility]    Hospitalist Progress Note    Date of Service (when I saw the patient): 06/22/2023    Assessment & Plan   Rich Moffett is a 67 year old male with below PMHX who presented to [facility] ED 6/20/23 after acute onset dysarthria with concern for acute CVA in the setting of L ICA stenosis. Transferred for Neurology and Vascular Surgery consultation.     Speech disturbances - family reports dysarthria, not clearly aphasic   Severe left ICA stenosis - likely asymptomatic as presentation not concerning for TIA or stroke as evidenced on imaging   Underlying neurocognitive disorder     Dysarthria, no aphasia. Resolved.  suspect acute CVA (low suspicion per neurology).   Severe L ICA stenosis: likely asymptomatic and  not the source of dysarthria per neuro.No hx of CVA.  Neg imaging for CVA.  A1C 5.9, trop 7. TTE unremarkable.  Vascular surgery consulted. Plan to do CEA of L ICA.  - Per Cont DAPT until carotid intervention and postop antiplatelet regimen per vascular surgery.  - Resume PTA BP meds.  - Follow pending labs.  - CEA to be done pending OR availability..    Suspected cognitive issues. Oriented x3.: VIT B12, TSH wnl. Treponema non reactive.  - Follow up with neurology.  - Delirium precautions.     Leukocytosis: WBC 13.6 on admission. Suspect reactive in the setting of above. No infectious sx reported on admission.   Resolved.  - Monitor     Described orthostasis: Reports intermittent bouts of positional dizziness at home. No episodes of true syncope, but has fallen because of this.   - Hydration.  - Follow orthostatics.     Hypertension:  - Resume PTA Lisinopril with holding parameters.  - Hold hydrochlorothiazide.     Chronic, stable medical conditions, not on meds PTA  GERD  Insomnia  Sensorineural hearing loss        Diet: IVF while NPO.  DVT Prophylaxis: Pneumatic Compression Devices  Rodriguez Catheter: Not present  Lines: None     Cardiac Monitoring: ACTIVE order.  Indication: Stroke, acute (48 hours)  Code Status: Full Code          Disposition Plan     Expected Discharge Date:  pending surgical plans.              Izabel Hardy MD    Interval History   Awake and oriented x3. Calm and cooperative. Denies pain. Denies dizziness. Denies headache.        Physical Exam   Temp: 98.1  F (36.7  C) Temp src: Oral BP: 113/78 Pulse: 55   Resp: 18 SpO2: 98 % O2 Device: None (Room air)    There were no vitals filed for this visit.  Vital Signs with Ranges  Temp:  [97.2  F (36.2  C)-98.2  F (36.8  C)] 98.1  F (36.7  C)  Pulse:  [55-70] 55  Resp:  [18-20] 18  BP: (111-146)/(75-84) 113/78  SpO2:  [92 %-98 %] 98 %  I/O last 3 completed shifts:  In: 240 [P.O.:240]  Out: -     CONSTITUTIONAL: Pt laying in bed, dressed in hospital garb. Appears comfortable.   HEENT: Normocephalic, atraumatic. Pupils equal, round, and reactive to light. EOMI, bilat. Poor dentition noted.    CARDIOVASCULAR: RRR..   RESPIRATORY: No increased work of breathing. CTA, bilat; no wheezes, rales, or rhonchi appreciated.  GASTROINTESTINAL:  Abdomen soft, non-distended. BS auscultated in all four quadrants. Negative for tenderness to palpation.  No masses or organomegaly noted.   HEMATOLOGIC/LYMPHATIC/IMMUNOLOGIC: Negative for lower extremity edema, bilaterally.  NEUROLOGIC: neg.     Medications     - MEDICATION INSTRUCTIONS -       - MEDICATION INSTRUCTIONS -         aspirin  325 mg Oral Daily     atorvastatin  80 mg Oral QPM     clopidogrel  75 mg Oral Daily     lisinopril  10 mg Oral Daily     sodium chloride (PF)  3 mL Intracatheter Q8H       Data   Recent Labs   Lab 06/21/23  2107 06/21/23  0842 06/20/23  2155 06/20/23  1736 06/20/23  1036   WBC  --  5.7  --   --  13.6*   HGB  --  14.2  --   --  14.7   MCV  --  84  --   --  85   PLT  --  192  --   --  209   INR  --   --   --   --  1.01   NA  --  136  --   --  137   POTASSIUM  --  3.9  --   --  4.1   CHLORIDE  --  100  --   --  101   CO2  --  25  --   --  24   BUN   --  17.6  --   --  19.6   CR  --  1.09  --   --  1.09   ANIONGAP  --  11  --   --  12   JASMINE  --  9.3  --   --  9.8   * 87 96   < > 131*    < > = values in this interval not displayed.       No results found for this or any previous visit (from the past 24 hour(s)).

## 2023-06-22 NOTE — DISCHARGE INSTRUCTIONS
Carotid Endarterectomy     Post-Operative Instructions     Typical length of stay in the hospital is 1-2 days.  On occasion, an evening in the Intensive Care Unit may be required for blood pressure regulation.     Activity   Most people can resume normal activities 1-2 weeks after surgery.  The key is to gradually increase your level of activity as you are able.  It is not uncommon to feel easily fatigued - this will improve with time.   You should avoid heavy lifting more than 30 lbs for 1 week.   You may return to work when you feel able - typically 1-2 weeks after surgery, depending on the type of work you do.   You should not drive a car for 1 week after surgery.  In addition, you can not be taking prescription strength pain medication and you must feel strong enough to drive safely.     Incision Care   You can shower or bathe 2 days after surgery - avoid rubbing and soaking your incision.   You may have strips of white tape called  steri-strips  across your incision; they should stay on for 5-7 days or until the ends start to curl up.  You can then remove the steri-strips, or we will remove them at your post-operative appointment.  Avoid shaving directly over the incision for 2-3 weeks.     Common Concerns   You may notice mild skin numbness on the side of your surgery in your neck, chin, face, and earlobe.  This is due to nerve  irritation  and will gradually resolve over the next several months.  Call our office if you experience any short-lasting episode of numbness or weakness affecting one side of your body.   Some bruising and firmness around your incision can be expected.  This will soften and resolve over the next few weeks.  You may notice that some discoloration spreads to an area lower than the incision, such as the shoulder, neck or upper chest.  Likewise, swelling can occur near the incision or below the chin.  Call our office if the swelling or bruising worsens, or if you have difficulty  swallowing.     Diet   You may resume a regular diet before you leave the hospital.  You may find that it is best to try smaller, more frequent meals until your appetite returns.     Medications   You may be started on a blood thinner after surgery - typically Aspirin and / or Plavix.   You may be given a prescription for pain medication after surgery.  If you need to have this refilled, please call your pharmacy where you had it filled.  They will then call us for approval.  Please do not call after hours for a refill on pain medication.     Post-Operative Appointments   You need to see your surgeon in the clinic approximately 1-2 weeks after surgery.   You will have an ultrasound test and evaluation with your surgeon 90 days (3 months) after surgery.   We will notify you by mail when you are due to schedule further ultrasound testing and evaluation; typically this is done annually.      When You Should Call Our Office   Body aches, chills or temperature greater than 101   Incision redness or drainage   Loss of vision in one eye   Loss of strength / weakness in one side of your body   Severe headache   Difficulty with speech        If you will be having an invasive procedure, such as a colonoscopy or dental work within one year of your surgery, you may need to take an antibiotic prior to the procedure if you had a Dacron patch with your surgery; please call us so we can assist you with this.     Call our main number, [phone], for assistance with any concerns or questions.    [facility]  [address] [place] [address] [address]  [place] [id number]

## 2023-06-23 ENCOUNTER — ANESTHESIA EVENT (OUTPATIENT)
Dept: SURGERY | Facility: CLINIC | Age: 68
DRG: 037 | End: 2023-06-23
Payer: COMMERCIAL

## 2023-06-23 ENCOUNTER — APPOINTMENT (OUTPATIENT)
Dept: ULTRASOUND IMAGING | Facility: CLINIC | Age: 68
DRG: 037 | End: 2023-06-23
Attending: SURGERY
Payer: COMMERCIAL

## 2023-06-23 ENCOUNTER — ANESTHESIA (OUTPATIENT)
Dept: SURGERY | Facility: CLINIC | Age: 68
DRG: 037 | End: 2023-06-23
Payer: COMMERCIAL

## 2023-06-23 LAB
ANION GAP SERPL CALCULATED.3IONS-SCNC: 12 MMOL/L (ref 7–15)
BUN SERPL-MCNC: 18.8 MG/DL (ref 8–23)
CALCIUM SERPL-MCNC: 9.5 MG/DL (ref 8.8–10.2)
CHLORIDE SERPL-SCNC: 106 MMOL/L (ref 98–107)
CREAT SERPL-MCNC: 1.04 MG/DL (ref 0.67–1.17)
DEPRECATED HCO3 PLAS-SCNC: 23 MMOL/L (ref 22–29)
ERYTHROCYTE [DISTWIDTH] IN BLOOD BY AUTOMATED COUNT: 13.2 % (ref 10–15)
GFR SERPL CREATININE-BSD FRML MDRD: 79 ML/MIN/1.73M2
GLUCOSE SERPL-MCNC: 107 MG/DL (ref 70–99)
HCT VFR BLD AUTO: 51.4 % (ref 40–53)
HGB BLD-MCNC: 17 G/DL (ref 13.3–17.7)
MCH RBC QN AUTO: 28.1 PG (ref 26.5–33)
MCHC RBC AUTO-ENTMCNC: 33.1 G/DL (ref 31.5–36.5)
MCV RBC AUTO: 85 FL (ref 78–100)
PLATELET # BLD AUTO: 238 10E3/UL (ref 150–450)
POTASSIUM SERPL-SCNC: 4.6 MMOL/L (ref 3.4–5.3)
RBC # BLD AUTO: 6.04 10E6/UL (ref 4.4–5.9)
SODIUM SERPL-SCNC: 141 MMOL/L (ref 136–145)
VIT B1 PYROPHOSHATE BLD-SCNC: 116 NMOL/L
WBC # BLD AUTO: 8.1 10E3/UL (ref 4–11)

## 2023-06-23 PROCEDURE — 250N000013 HC RX MED GY IP 250 OP 250 PS 637: Performed by: SURGERY

## 2023-06-23 PROCEDURE — C1763 CONN TISS, NON-HUMAN: HCPCS | Performed by: SURGERY

## 2023-06-23 PROCEDURE — 258N000003 HC RX IP 258 OP 636: Performed by: NURSE ANESTHETIST, CERTIFIED REGISTERED

## 2023-06-23 PROCEDURE — 35301 RECHANNELING OF ARTERY: CPT | Mod: LT | Performed by: SURGERY

## 2023-06-23 PROCEDURE — 80048 BASIC METABOLIC PNL TOTAL CA: CPT | Performed by: HOSPITALIST

## 2023-06-23 PROCEDURE — 922N000001 HC NEURO MONITORING SERVICE, UP TO 7 HOURS (T1FEE): Performed by: SURGERY

## 2023-06-23 PROCEDURE — 250N000009 HC RX 250: Performed by: ANESTHESIOLOGY

## 2023-06-23 PROCEDURE — 250N000011 HC RX IP 250 OP 636: Mod: JZ | Performed by: HOSPITALIST

## 2023-06-23 PROCEDURE — 258N000003 HC RX IP 258 OP 636: Performed by: HOSPITALIST

## 2023-06-23 PROCEDURE — 85027 COMPLETE CBC AUTOMATED: CPT | Performed by: HOSPITALIST

## 2023-06-23 PROCEDURE — 710N000010 HC RECOVERY PHASE 1, LEVEL 2, PER MIN: Performed by: SURGERY

## 2023-06-23 PROCEDURE — 36415 COLL VENOUS BLD VENIPUNCTURE: CPT | Performed by: HOSPITALIST

## 2023-06-23 PROCEDURE — 250N000011 HC RX IP 250 OP 636: Performed by: SURGERY

## 2023-06-23 PROCEDURE — 272N000001 HC OR GENERAL SUPPLY STERILE: Performed by: SURGERY

## 2023-06-23 PROCEDURE — 272N000282 HC OR IOM SUPPLIES OPNP: Performed by: SURGERY

## 2023-06-23 PROCEDURE — 258N000003 HC RX IP 258 OP 636: Performed by: SURGERY

## 2023-06-23 PROCEDURE — 120N000001 HC R&B MED SURG/OB

## 2023-06-23 PROCEDURE — 4A10X4G MONITORING OF CENTRAL NERVOUS ELECTRICAL ACTIVITY, INTRAOPERATIVE, EXTERNAL APPROACH: ICD-10-PCS | Performed by: SURGERY

## 2023-06-23 PROCEDURE — 07B20ZX EXCISION OF LEFT NECK LYMPHATIC, OPEN APPROACH, DIAGNOSTIC: ICD-10-PCS | Performed by: SURGERY

## 2023-06-23 PROCEDURE — 250N000011 HC RX IP 250 OP 636: Mod: JZ | Performed by: NURSE ANESTHETIST, CERTIFIED REGISTERED

## 2023-06-23 PROCEDURE — 03UJ0KZ SUPPLEMENT LEFT COMMON CAROTID ARTERY WITH NONAUTOLOGOUS TISSUE SUBSTITUTE, OPEN APPROACH: ICD-10-PCS | Performed by: SURGERY

## 2023-06-23 PROCEDURE — 999N000063 US INTRAOPERATIVE: Mod: TC

## 2023-06-23 PROCEDURE — 360N000077 HC SURGERY LEVEL 4, PER MIN: Performed by: SURGERY

## 2023-06-23 PROCEDURE — 250N000009 HC RX 250: Performed by: SURGERY

## 2023-06-23 PROCEDURE — 35301 RECHANNELING OF ARTERY: CPT | Mod: 80 | Performed by: SURGERY

## 2023-06-23 PROCEDURE — 250N000025 HC SEVOFLURANE, PER MIN: Performed by: SURGERY

## 2023-06-23 PROCEDURE — 88305 TISSUE EXAM BY PATHOLOGIST: CPT | Mod: TC | Performed by: SURGERY

## 2023-06-23 PROCEDURE — 03CJ0ZZ EXTIRPATION OF MATTER FROM LEFT COMMON CAROTID ARTERY, OPEN APPROACH: ICD-10-PCS | Performed by: SURGERY

## 2023-06-23 PROCEDURE — 999N000141 HC STATISTIC PRE-PROCEDURE NURSING ASSESSMENT: Performed by: SURGERY

## 2023-06-23 PROCEDURE — 258N000003 HC RX IP 258 OP 636: Performed by: ANESTHESIOLOGY

## 2023-06-23 PROCEDURE — 250N000013 HC RX MED GY IP 250 OP 250 PS 637: Performed by: HOSPITALIST

## 2023-06-23 PROCEDURE — 250N000009 HC RX 250: Performed by: NURSE ANESTHETIST, CERTIFIED REGISTERED

## 2023-06-23 PROCEDURE — 250N000011 HC RX IP 250 OP 636: Performed by: ANESTHESIOLOGY

## 2023-06-23 PROCEDURE — 99232 SBSQ HOSP IP/OBS MODERATE 35: CPT | Performed by: HOSPITALIST

## 2023-06-23 PROCEDURE — 370N000017 HC ANESTHESIA TECHNICAL FEE, PER MIN: Performed by: SURGERY

## 2023-06-23 PROCEDURE — 250N000013 HC RX MED GY IP 250 OP 250 PS 637: Performed by: PHYSICIAN ASSISTANT

## 2023-06-23 DEVICE — DECELLULARIZED BOVINE PERICARDIUM
Type: IMPLANTABLE DEVICE | Site: NECK | Status: FUNCTIONAL
Brand: PHOTOFIX DECELLULARIZED BOVINE PERICARDIUM

## 2023-06-23 RX ORDER — DEXMEDETOMIDINE HYDROCHLORIDE 4 UG/ML
INJECTION, SOLUTION INTRAVENOUS PRN
Status: DISCONTINUED | OUTPATIENT
Start: 2023-06-23 | End: 2023-06-23

## 2023-06-23 RX ORDER — NALOXONE HYDROCHLORIDE 0.4 MG/ML
0.4 INJECTION, SOLUTION INTRAMUSCULAR; INTRAVENOUS; SUBCUTANEOUS
Status: DISCONTINUED | OUTPATIENT
Start: 2023-06-23 | End: 2023-06-24 | Stop reason: HOSPADM

## 2023-06-23 RX ORDER — SODIUM CHLORIDE 9 MG/ML
INJECTION, SOLUTION INTRAVENOUS CONTINUOUS
Status: ACTIVE | OUTPATIENT
Start: 2023-06-23 | End: 2023-06-23

## 2023-06-23 RX ORDER — NALOXONE HYDROCHLORIDE 0.4 MG/ML
0.2 INJECTION, SOLUTION INTRAMUSCULAR; INTRAVENOUS; SUBCUTANEOUS
Status: DISCONTINUED | OUTPATIENT
Start: 2023-06-23 | End: 2023-06-24 | Stop reason: HOSPADM

## 2023-06-23 RX ORDER — OXYCODONE HYDROCHLORIDE 5 MG/1
5 TABLET ORAL EVERY 4 HOURS PRN
Status: DISCONTINUED | OUTPATIENT
Start: 2023-06-23 | End: 2023-06-24 | Stop reason: HOSPADM

## 2023-06-23 RX ORDER — HYDROMORPHONE HCL IN WATER/PF 6 MG/30 ML
0.2 PATIENT CONTROLLED ANALGESIA SYRINGE INTRAVENOUS EVERY 5 MIN PRN
Status: DISCONTINUED | OUTPATIENT
Start: 2023-06-23 | End: 2023-06-23 | Stop reason: HOSPADM

## 2023-06-23 RX ORDER — ASPIRIN 325 MG
325 TABLET ORAL DAILY
Status: DISCONTINUED | OUTPATIENT
Start: 2023-06-24 | End: 2023-06-24 | Stop reason: HOSPADM

## 2023-06-23 RX ORDER — CEFAZOLIN SODIUM/WATER 2 G/20 ML
2 SYRINGE (ML) INTRAVENOUS SEE ADMIN INSTRUCTIONS
Status: DISCONTINUED | OUTPATIENT
Start: 2023-06-23 | End: 2023-06-23 | Stop reason: HOSPADM

## 2023-06-23 RX ORDER — LISINOPRIL 20 MG/1
20 TABLET ORAL DAILY
Status: DISCONTINUED | OUTPATIENT
Start: 2023-06-23 | End: 2023-06-24 | Stop reason: HOSPADM

## 2023-06-23 RX ORDER — ONDANSETRON 2 MG/ML
4 INJECTION INTRAMUSCULAR; INTRAVENOUS EVERY 30 MIN PRN
Status: DISCONTINUED | OUTPATIENT
Start: 2023-06-23 | End: 2023-06-23 | Stop reason: HOSPADM

## 2023-06-23 RX ORDER — HEPARIN SODIUM 1000 [USP'U]/ML
INJECTION, SOLUTION INTRAVENOUS; SUBCUTANEOUS PRN
Status: DISCONTINUED | OUTPATIENT
Start: 2023-06-23 | End: 2023-06-23

## 2023-06-23 RX ORDER — PROPOFOL 10 MG/ML
INJECTION, EMULSION INTRAVENOUS CONTINUOUS PRN
Status: DISCONTINUED | OUTPATIENT
Start: 2023-06-23 | End: 2023-06-23

## 2023-06-23 RX ORDER — ONDANSETRON 4 MG/1
4 TABLET, ORALLY DISINTEGRATING ORAL EVERY 30 MIN PRN
Status: DISCONTINUED | OUTPATIENT
Start: 2023-06-23 | End: 2023-06-23 | Stop reason: HOSPADM

## 2023-06-23 RX ORDER — HYDROMORPHONE HCL IN WATER/PF 6 MG/30 ML
0.4 PATIENT CONTROLLED ANALGESIA SYRINGE INTRAVENOUS EVERY 5 MIN PRN
Status: DISCONTINUED | OUTPATIENT
Start: 2023-06-23 | End: 2023-06-23 | Stop reason: HOSPADM

## 2023-06-23 RX ORDER — FENTANYL CITRATE 50 UG/ML
INJECTION, SOLUTION INTRAMUSCULAR; INTRAVENOUS PRN
Status: DISCONTINUED | OUTPATIENT
Start: 2023-06-23 | End: 2023-06-23

## 2023-06-23 RX ORDER — ACETAMINOPHEN 325 MG/1
650 TABLET ORAL EVERY 4 HOURS PRN
Status: DISCONTINUED | OUTPATIENT
Start: 2023-06-23 | End: 2023-06-24 | Stop reason: HOSPADM

## 2023-06-23 RX ORDER — EPHEDRINE SULFATE 50 MG/ML
INJECTION, SOLUTION INTRAMUSCULAR; INTRAVENOUS; SUBCUTANEOUS PRN
Status: DISCONTINUED | OUTPATIENT
Start: 2023-06-23 | End: 2023-06-23

## 2023-06-23 RX ORDER — LABETALOL 20 MG/4 ML (5 MG/ML) INTRAVENOUS SYRINGE
PRN
Status: DISCONTINUED | OUTPATIENT
Start: 2023-06-23 | End: 2023-06-23

## 2023-06-23 RX ORDER — LIDOCAINE HYDROCHLORIDE 10 MG/ML
INJECTION, SOLUTION INFILTRATION; PERINEURAL
Status: COMPLETED | OUTPATIENT
Start: 2023-06-23 | End: 2023-06-23

## 2023-06-23 RX ORDER — SODIUM CHLORIDE, SODIUM LACTATE, POTASSIUM CHLORIDE, CALCIUM CHLORIDE 600; 310; 30; 20 MG/100ML; MG/100ML; MG/100ML; MG/100ML
INJECTION, SOLUTION INTRAVENOUS CONTINUOUS
Status: DISCONTINUED | OUTPATIENT
Start: 2023-06-23 | End: 2023-06-23 | Stop reason: HOSPADM

## 2023-06-23 RX ORDER — DEXAMETHASONE SODIUM PHOSPHATE 4 MG/ML
INJECTION, SOLUTION INTRA-ARTICULAR; INTRALESIONAL; INTRAMUSCULAR; INTRAVENOUS; SOFT TISSUE PRN
Status: DISCONTINUED | OUTPATIENT
Start: 2023-06-23 | End: 2023-06-23

## 2023-06-23 RX ORDER — PROTAMINE SULFATE 10 MG/ML
INJECTION, SOLUTION INTRAVENOUS PRN
Status: DISCONTINUED | OUTPATIENT
Start: 2023-06-23 | End: 2023-06-23

## 2023-06-23 RX ORDER — ASPIRIN 81 MG/1
81 TABLET, CHEWABLE ORAL
Status: COMPLETED | OUTPATIENT
Start: 2023-06-23 | End: 2023-06-23

## 2023-06-23 RX ORDER — MAGNESIUM HYDROXIDE 1200 MG/15ML
LIQUID ORAL PRN
Status: DISCONTINUED | OUTPATIENT
Start: 2023-06-23 | End: 2023-06-23 | Stop reason: HOSPADM

## 2023-06-23 RX ORDER — FENTANYL CITRATE 0.05 MG/ML
25 INJECTION, SOLUTION INTRAMUSCULAR; INTRAVENOUS EVERY 5 MIN PRN
Status: DISCONTINUED | OUTPATIENT
Start: 2023-06-23 | End: 2023-06-23 | Stop reason: HOSPADM

## 2023-06-23 RX ORDER — HEPARIN SODIUM 1000 [USP'U]/ML
INJECTION, SOLUTION INTRAVENOUS; SUBCUTANEOUS PRN
Status: DISCONTINUED | OUTPATIENT
Start: 2023-06-23 | End: 2023-06-23 | Stop reason: HOSPADM

## 2023-06-23 RX ORDER — HYDROMORPHONE HYDROCHLORIDE 1 MG/ML
0.5 INJECTION, SOLUTION INTRAMUSCULAR; INTRAVENOUS; SUBCUTANEOUS EVERY 4 HOURS PRN
Status: DISCONTINUED | OUTPATIENT
Start: 2023-06-23 | End: 2023-06-24 | Stop reason: HOSPADM

## 2023-06-23 RX ORDER — FENTANYL CITRATE 0.05 MG/ML
50 INJECTION, SOLUTION INTRAMUSCULAR; INTRAVENOUS EVERY 5 MIN PRN
Status: DISCONTINUED | OUTPATIENT
Start: 2023-06-23 | End: 2023-06-23 | Stop reason: HOSPADM

## 2023-06-23 RX ORDER — LIDOCAINE 40 MG/G
CREAM TOPICAL
Status: DISCONTINUED | OUTPATIENT
Start: 2023-06-23 | End: 2023-06-23 | Stop reason: HOSPADM

## 2023-06-23 RX ORDER — ASPIRIN 81 MG/1
162 TABLET, CHEWABLE ORAL
Status: COMPLETED | OUTPATIENT
Start: 2023-06-23 | End: 2023-06-23

## 2023-06-23 RX ORDER — ATORVASTATIN CALCIUM 80 MG/1
80 TABLET, FILM COATED ORAL EVERY EVENING
Status: DISCONTINUED | OUTPATIENT
Start: 2023-06-23 | End: 2023-06-24 | Stop reason: HOSPADM

## 2023-06-23 RX ORDER — ONDANSETRON 2 MG/ML
INJECTION INTRAMUSCULAR; INTRAVENOUS PRN
Status: DISCONTINUED | OUTPATIENT
Start: 2023-06-23 | End: 2023-06-23

## 2023-06-23 RX ORDER — PROPOFOL 10 MG/ML
INJECTION, EMULSION INTRAVENOUS PRN
Status: DISCONTINUED | OUTPATIENT
Start: 2023-06-23 | End: 2023-06-23

## 2023-06-23 RX ORDER — HYDRALAZINE HYDROCHLORIDE 20 MG/ML
10 INJECTION INTRAMUSCULAR; INTRAVENOUS EVERY 6 HOURS PRN
Status: DISCONTINUED | OUTPATIENT
Start: 2023-06-23 | End: 2023-06-24 | Stop reason: HOSPADM

## 2023-06-23 RX ORDER — LIDOCAINE HYDROCHLORIDE 20 MG/ML
INJECTION, SOLUTION INFILTRATION; PERINEURAL PRN
Status: DISCONTINUED | OUTPATIENT
Start: 2023-06-23 | End: 2023-06-23

## 2023-06-23 RX ORDER — CLOPIDOGREL BISULFATE 75 MG/1
75 TABLET ORAL DAILY
Status: DISCONTINUED | OUTPATIENT
Start: 2023-06-23 | End: 2023-06-24 | Stop reason: HOSPADM

## 2023-06-23 RX ORDER — FENTANYL CITRATE 0.05 MG/ML
50 INJECTION, SOLUTION INTRAMUSCULAR; INTRAVENOUS
Status: DISCONTINUED | OUTPATIENT
Start: 2023-06-23 | End: 2023-06-23 | Stop reason: HOSPADM

## 2023-06-23 RX ORDER — HEPARIN SODIUM 10000 [USP'U]/ML
INJECTION, SOLUTION INTRAVENOUS; SUBCUTANEOUS PRN
Status: DISCONTINUED | OUTPATIENT
Start: 2023-06-23 | End: 2023-06-23 | Stop reason: HOSPADM

## 2023-06-23 RX ORDER — LABETALOL HYDROCHLORIDE 5 MG/ML
10 INJECTION, SOLUTION INTRAVENOUS EVERY 4 HOURS PRN
Status: DISCONTINUED | OUTPATIENT
Start: 2023-06-23 | End: 2023-06-24 | Stop reason: HOSPADM

## 2023-06-23 RX ORDER — CEFAZOLIN SODIUM/WATER 2 G/20 ML
2 SYRINGE (ML) INTRAVENOUS
Status: COMPLETED | OUTPATIENT
Start: 2023-06-23 | End: 2023-06-23

## 2023-06-23 RX ORDER — LISINOPRIL 10 MG/1
10 TABLET ORAL DAILY
Status: DISCONTINUED | OUTPATIENT
Start: 2023-06-23 | End: 2023-06-23

## 2023-06-23 RX ADMIN — HEPARIN SODIUM 7000 UNITS: 1000 INJECTION, SOLUTION INTRAVENOUS; SUBCUTANEOUS at 12:10

## 2023-06-23 RX ADMIN — DEXMEDETOMIDINE HYDROCHLORIDE 8 MCG: 200 INJECTION INTRAVENOUS at 13:08

## 2023-06-23 RX ADMIN — PHENYLEPHRINE HYDROCHLORIDE 100 MCG: 10 INJECTION INTRAVENOUS at 13:14

## 2023-06-23 RX ADMIN — Medication 5 MG: at 11:51

## 2023-06-23 RX ADMIN — Medication 5 MG: at 13:03

## 2023-06-23 RX ADMIN — ROCURONIUM BROMIDE 10 MG: 50 INJECTION, SOLUTION INTRAVENOUS at 12:06

## 2023-06-23 RX ADMIN — Medication 5 MG: at 11:48

## 2023-06-23 RX ADMIN — FENTANYL CITRATE 50 MCG: 50 INJECTION, SOLUTION INTRAMUSCULAR; INTRAVENOUS at 11:50

## 2023-06-23 RX ADMIN — PHENYLEPHRINE HYDROCHLORIDE 100 MCG: 10 INJECTION INTRAVENOUS at 11:39

## 2023-06-23 RX ADMIN — PHENYLEPHRINE HYDROCHLORIDE 0.3 MCG/KG/MIN: 10 INJECTION INTRAVENOUS at 11:46

## 2023-06-23 RX ADMIN — DEXMEDETOMIDINE HYDROCHLORIDE 12 MCG: 200 INJECTION INTRAVENOUS at 11:48

## 2023-06-23 RX ADMIN — Medication 2 G: at 11:36

## 2023-06-23 RX ADMIN — SODIUM CHLORIDE: 9 INJECTION, SOLUTION INTRAVENOUS at 00:29

## 2023-06-23 RX ADMIN — Medication 10 MG: at 13:23

## 2023-06-23 RX ADMIN — DEXAMETHASONE SODIUM PHOSPHATE 4 MG: 4 INJECTION, SOLUTION INTRA-ARTICULAR; INTRALESIONAL; INTRAMUSCULAR; INTRAVENOUS; SOFT TISSUE at 11:55

## 2023-06-23 RX ADMIN — SODIUM CHLORIDE, POTASSIUM CHLORIDE, SODIUM LACTATE AND CALCIUM CHLORIDE: 600; 310; 30; 20 INJECTION, SOLUTION INTRAVENOUS at 10:13

## 2023-06-23 RX ADMIN — MIDAZOLAM 1 MG: 1 INJECTION INTRAMUSCULAR; INTRAVENOUS at 10:38

## 2023-06-23 RX ADMIN — LIDOCAINE HYDROCHLORIDE 100 MG: 20 INJECTION, SOLUTION INFILTRATION; PERINEURAL at 11:29

## 2023-06-23 RX ADMIN — LISINOPRIL 20 MG: 20 TABLET ORAL at 17:47

## 2023-06-23 RX ADMIN — ROCURONIUM BROMIDE 20 MG: 50 INJECTION, SOLUTION INTRAVENOUS at 12:33

## 2023-06-23 RX ADMIN — LABETALOL 20 MG/4 ML (5 MG/ML) INTRAVENOUS SYRINGE 20 MG: at 13:46

## 2023-06-23 RX ADMIN — LIDOCAINE HYDROCHLORIDE 2 ML: 10 INJECTION, SOLUTION INFILTRATION; PERINEURAL at 10:30

## 2023-06-23 RX ADMIN — PROPOFOL 50 MG: 10 INJECTION, EMULSION INTRAVENOUS at 11:50

## 2023-06-23 RX ADMIN — ASPIRIN 325 MG ORAL TABLET 325 MG: 325 PILL ORAL at 07:56

## 2023-06-23 RX ADMIN — PROPOFOL 150 MG: 10 INJECTION, EMULSION INTRAVENOUS at 11:29

## 2023-06-23 RX ADMIN — CLOPIDOGREL BISULFATE 75 MG: 75 TABLET ORAL at 16:56

## 2023-06-23 RX ADMIN — ONDANSETRON 4 MG: 2 INJECTION INTRAMUSCULAR; INTRAVENOUS at 13:14

## 2023-06-23 RX ADMIN — FENTANYL CITRATE 50 MCG: 50 INJECTION, SOLUTION INTRAMUSCULAR; INTRAVENOUS at 11:29

## 2023-06-23 RX ADMIN — PROPOFOL 20 MCG/KG/MIN: 10 INJECTION, EMULSION INTRAVENOUS at 11:50

## 2023-06-23 RX ADMIN — ACETAMINOPHEN 650 MG: 325 TABLET ORAL at 21:28

## 2023-06-23 RX ADMIN — LABETALOL HYDROCHLORIDE 10 MG: 5 INJECTION, SOLUTION INTRAVENOUS at 18:54

## 2023-06-23 RX ADMIN — PHENYLEPHRINE HYDROCHLORIDE 100 MCG: 10 INJECTION INTRAVENOUS at 12:17

## 2023-06-23 RX ADMIN — HYDRALAZINE HYDROCHLORIDE 10 MG: 20 INJECTION INTRAMUSCULAR; INTRAVENOUS at 22:14

## 2023-06-23 RX ADMIN — ATORVASTATIN CALCIUM 80 MG: 80 TABLET, FILM COATED ORAL at 20:19

## 2023-06-23 RX ADMIN — FENTANYL CITRATE 50 MCG: 50 INJECTION, SOLUTION INTRAMUSCULAR; INTRAVENOUS at 10:39

## 2023-06-23 RX ADMIN — PHENYLEPHRINE HYDROCHLORIDE 100 MCG: 10 INJECTION INTRAVENOUS at 13:30

## 2023-06-23 RX ADMIN — Medication 5 MG: at 12:10

## 2023-06-23 RX ADMIN — Medication 5 MG: at 11:45

## 2023-06-23 RX ADMIN — PHENYLEPHRINE HYDROCHLORIDE 100 MCG: 10 INJECTION INTRAVENOUS at 11:50

## 2023-06-23 RX ADMIN — ROCURONIUM BROMIDE 40 MG: 50 INJECTION, SOLUTION INTRAVENOUS at 11:29

## 2023-06-23 RX ADMIN — ROCURONIUM BROMIDE 10 MG: 50 INJECTION, SOLUTION INTRAVENOUS at 11:56

## 2023-06-23 RX ADMIN — SUGAMMADEX 200 MG: 100 INJECTION, SOLUTION INTRAVENOUS at 13:36

## 2023-06-23 RX ADMIN — ACETAMINOPHEN 650 MG: 325 TABLET ORAL at 16:56

## 2023-06-23 RX ADMIN — PROTAMINE SULFATE 50 MG: 10 INJECTION, SOLUTION INTRAVENOUS at 13:12

## 2023-06-23 RX ADMIN — PHENYLEPHRINE HYDROCHLORIDE 100 MCG: 10 INJECTION INTRAVENOUS at 13:22

## 2023-06-23 RX ADMIN — MIDAZOLAM 1 MG: 1 INJECTION INTRAMUSCULAR; INTRAVENOUS at 11:22

## 2023-06-23 ASSESSMENT — ACTIVITIES OF DAILY LIVING (ADL)
ADLS_ACUITY_SCORE: 24
ADLS_ACUITY_SCORE: 24
ADLS_ACUITY_SCORE: 28
ADLS_ACUITY_SCORE: 24
ADLS_ACUITY_SCORE: 24
ADLS_ACUITY_SCORE: 28
ADLS_ACUITY_SCORE: 24

## 2023-06-23 NOTE — ANESTHESIA PROCEDURE NOTES
Airway       Patient location during procedure: OR       Procedure Start/Stop Times: 6/23/2023 11:32 AM  Staff -        Anesthesiologist:  Mc Rico MD       CRNA: Brendan Jones APRN CRNA       Performed By: CRNA  Consent for Airway        Urgency: elective  Indications and Patient Condition       Indications for airway management: kalyn-procedural and airway protection       Induction type:intravenous       Mask difficulty assessment: 2 - vent by mask + OA or adjuvant +/- NMBA    Final Airway Details       Final airway type: endotracheal airway       Successful airway: ETT - single and Oral  Endotracheal Airway Details        ETT size (mm): 8.0       Cuffed: yes       Successful intubation technique: video laryngoscopy       VL Blade Size: Nix 4       Grade View of Cords: 1       Adjucts: stylet       Position: Right       Measured from: lips       Secured at (cm): 23       Bite block used: None    Post intubation assessment        Placement verified by: capnometry, equal breath sounds and chest rise        Number of attempts at approach: 1       Number of other approaches attempted: 0       Secured with: pink tape       Ease of procedure: easy       Dentition: Unchanged    Medication(s) Administered   Medication Administration Time: 6/23/2023 11:32 AM

## 2023-06-23 NOTE — OP NOTE
Vascular Surgery Operative Note     PREOPERATIVE DIAGNOSIS:  Asymptomatic left carotid stenosis     POSTOPERATIVE DIAGNOSIS:  Same     PROCEDURE: Left carotid endarterectomy with patch angioplasty and intraoperative EEG monitoring.     SURGEON:  Kimberli Macdonald MD     ASSISTANT:  Dexter Whitley MD     ANESTHESIA:  General Endotracheal     ESTIMATED BLOOD LOSS:  100 mL    SPECIMENS: Left carotid plaque (not submitted to pathology): precarotid deep cervical lymph nodes (submitted to pathology)      INDICATIONS:    Mr. Moffett is a 67M with history of HTN, DL, GERD, who presented with concerns for TIA based on dysarthria in the setting of baseline neurocognitive disorder. He was evaluated by Neurology, who felt that this was most likely not a TIA, but given the presence of significant left carotid stenosis and social constraints with returning for future care, he was brought to the OR during this admission to undergo left carotid endarterectomy.      INTRAOPERATIVE FINDINGS:    Focal mixed soft and calcific plaque in the common carotid artery and internal carotid artery, with associated luminal narrowing, extending fairly high into the ICA.  There were no changes in EEG monitoring. Neurologically intact at case completion.      DESCRIPTION OF TECHNIQUE:   Mr. Moffett was brought into the operating room and transferred to the operating table in the supine position. After uneventful endotracheal intubation, He underwent placement of an arterial line and EEG monitors. Antibiotics were administered. The arms were padded and tucked at the sides and a shoulder roll was used to obtain adequate neck extension. The anterior left neck and chest were prepped and draped in standard sterile fashion. Timeout was performed and the entire surgical team was in agreement with the planned procedure and correctly marked side.      An incision was made over the anterior border of the left sternocleidomastoid muscle in oblique fashion.   The subcutaneous tissues were dissected with electrocautery and the platysma muscle was divided.  We identified the anterior border of the sternocleidomastoid muscle and dissected down to the level of the carotid sheath, retracting the internal jugular vein laterally to expose the common carotid artery.  The vagus nerve was identified posteriorly between the common carotid and internal jugular vein and protected.  We obtained circumferential control of the common carotid artery with a silastic vessel loop in a Duarte fashion.  We then continued our dissection cephalad, exposing the external carotid artery and the superior thyroid artery, and these branches were encircled with silastic vessel loops in a Duarte fashion. Finally, the internal carotid artery was identified and encircled with a vessel loop. The hypoglossal nerve was identified, preserved, and mobilized medially and superiorly, where it was protected during the case. There was no hemodynamic change during dissection around the carotid bulb.    7000 units of IV heparin were administered by anesthesia. The mean arterial pressure was then medically elevated to above 90 mmHg and a Nelson carotid clamp was placed on the internal carotid artery. No EEG changes were observed. The common carotid artery was then clamped and the silastic loops tightened around the external carotid and superior thyroidal arteries for hemostatic control.  An 11-blade scalpel was used to create a longitudinal arteriotomy on the common carotid. This was extended superiorly with Duarte scissors, taking care to expose the lumen of the vessel.  EEG remained stable with no changes at this time, so I proceeded without shunting.  A [place] elevator was used to mobilize the plaque proximally into the common carotid artery; the proximal endpoint was sharply transected with Duarte scissors. Attention was then focused on the distal endpoint; the plaque was elevated and feathered at its endpoint in  the distal internal carotid artery. The external carotid artery plaque was removed via eversion. There was good backbleeding from the external carotid artery and there was no debris within the lumen.       The distal endpoint in the internal carotid artery was again inspected. The distal endpoint was confirmed to be secure with heparinized saline injection. The endarterectomized surface was inspected and any free plaque fibers were removed.  A 8 cm x 0.8 cm bovine pericardial patch was opened and was sutured in place with a 6-0 prolene suture. The proximal common carotid point of the patch was cut appropriately to size and another 6-0 Prolene suture was used in a running fashion to complete the patch angioplasty. The common carotid, internal carotid artery, and external carotid artery were serially flushed and the endarterectomized surface was flushed copiously with heparinized saline via syringe before the final sutures were placed.  The patch angioplasty was completed and flow was opened first to the external carotid artery, followed by the common carotid and then the internal carotid. A hand-held sterile ultrasound probe was used to evaluate each of the three vessels intraoperatively, suggesting a strong waveform in the common carotid artery with a continuous diastolic component in the internal carotid artery and no mobile residual plaque fibers. There were no changes in EEG monitoring.      We inspected for hemostasis and this was obtained with topical hemostatics. Additional interrupted 6-0 prolene sutures were placed as needed for suture-line bleeding and 50 mg protamine was given for heparin reversal. The wound was irrigated and hemostasis confirmed. The sternocleidomastoid was loosely closed with interrupted sutures over the carotid sheath. The platysma was closed with running absorbable sutures, followed by skin closure in a running subcuticular fashion with a 4-0 Monocryl suture. The skin was cleaned and  dried and steri-strips and a sterile bandage applied.     Mr. Moffett was then extubated in stable condition, following commands and able to move all four extremities.  He was taken to the postoperative care unit for evaluation and close monitoring.  At the end of the case all needle, sponge and instrument counts were correct.      No other surgical assistance was available and high exposure was necessary for the case; Dr. Whitley was asked to assist in this operation.

## 2023-06-23 NOTE — PROGRESS NOTES
[facility]    Hospitalist Progress Note    Date of Service (when I saw the patient): 06/23/2023    Assessment & Plan   Rich Moffett is a 67 year old male with below PMHX who presented to [facility] ED 6/20/23 after acute onset dysarthria with concern for acute CVA in the setting of L ICA stenosis. Transferred for Neurology and Vascular Surgery consultation.     Speech disturbances - family reports dysarthria, not clearly aphasic   Severe left ICA stenosis - likely asymptomatic as presentation not concerning for TIA or stroke as evidenced on imaging   Underlying neurocognitive disorder     Dysarthria, no aphasia. Resolved.  suspect acute CVA (low suspicion per neurology).   Severe L ICA stenosis: likely asymptomatic and  not the source of dysarthria per neuro.No hx of CVA.  Neg imaging for CVA.  A1C 5.9, trop 7. TTE unremarkable.  Vascular surgery consulted. Plan to do CEA of L ICA today.  - Post-op antiplatelet regimen per vascular surgery.  - Follow pending labs.  - CEA to be done today by vasc-surg..    Suspected cognitive issues. Oriented x3.: VIT B12, TSH wnl. Treponema non reactive.  - Follow up with neurology.  - Delirium precautions.     Leukocytosis: WBC 13.6 on admission. Suspect reactive in the setting of above. No infectious sx reported on admission.   Resolved.  - Monitor     Described orthostasis: Reports intermittent bouts of positional dizziness at home. No episodes of true syncope, but has fallen because of this.   - Hydration.  - Follow orthostatics.     Hypertension:  - Resumed PTA Lisinopril with holding parameters.  - Hold hydrochlorothiazide.     Chronic, stable medical conditions, not on meds PTA  GERD  Insomnia  Sensorineural hearing loss        Diet: IVF while NPO.  DVT Prophylaxis: Pneumatic Compression Devices  Rodriguez Catheter: Not present  Lines: None     Cardiac Monitoring: ACTIVE order. Indication: Stroke, acute (48 hours)  Code Status: Full  Code          Disposition Plan     Expected Discharge Date:  pending surgical plans.              Izabel Hardy MD    Interval History   Awake and oriented x3. Calm and cooperative. Denies pain. Denies dizziness. Denies headache.  Looking forward to today's surgery.        Physical Exam   Temp: 97.4  F (36.3  C) Temp src: Oral BP: (!) 146/90 Pulse: 58   Resp: 16 SpO2: 98 % O2 Device: None (Room air)    There were no vitals filed for this visit.  Vital Signs with Ranges  Temp:  [97.4  F (36.3  C)-97.8  F (36.6  C)] 97.4  F (36.3  C)  Pulse:  [57-69] 58  Resp:  [16-18] 16  BP: (123-146)/(69-92) 146/90  SpO2:  [93 %-98 %] 98 %  No intake/output data recorded.    CONSTITUTIONAL: Pt laying in bed, dressed in hospital garb. Appears comfortable.   HEENT: Normocephalic, atraumatic. Pupils equal, round, and reactive to light. EOMI, bilat. Poor dentition noted.    CARDIOVASCULAR: RRR..   RESPIRATORY: No increased work of breathing. CTA, bilat; no wheezes, rales, or rhonchi appreciated.  GASTROINTESTINAL:  Abdomen soft, non-distended. BS auscultated in all four quadrants. Negative for tenderness to palpation.  No masses or organomegaly noted.   HEMATOLOGIC/LYMPHATIC/IMMUNOLOGIC: Negative for lower extremity edema, bilaterally.  NEUROLOGIC: neg.     Medications     - MEDICATION INSTRUCTIONS -       - MEDICATION INSTRUCTIONS -       sodium chloride 75 mL/hr at 06/23/23 0029       aspirin  325 mg Oral Daily     atorvastatin  80 mg Oral QPM     [Held by provider] clopidogrel  75 mg Oral Daily     lisinopril  10 mg Oral Daily     sodium chloride (PF)  3 mL Intracatheter Q8H       Data   Recent Labs   Lab 06/21/23  2107 06/21/23  0842 06/20/23  2155 06/20/23  1736 06/20/23  1036   WBC  --  5.7  --   --  13.6*   HGB  --  14.2  --   --  14.7   MCV  --  84  --   --  85   PLT  --  192  --   --  209   INR  --   --   --   --  1.01   NA  --  136  --   --  137   POTASSIUM  --  3.9  --   --  4.1   CHLORIDE  --  100  --   --  101   CO2  --   25  --   --  24   BUN  --  17.6  --   --  19.6   CR  --  1.09  --   --  1.09   ANIONGAP  --  11  --   --  12   JASMINE  --  9.3  --   --  9.8   * 87 96   < > 131*    < > = values in this interval not displayed.       No results found for this or any previous visit (from the past 24 hour(s)).

## 2023-06-23 NOTE — PLAN OF CARE
[id number]: Pt here with left-sided ICA stenosis. A&Ox4. Slurred speech noted (missing teeth), and slow speech noted at times. Other neuros intact. VSS. Tele NSR. Up with SBA w/ GB. Cont. B&B. NPO since midnight for planned L-sided CEA. Shower with surgical prep completed this AM at approx. 0840 with minimal assist x1. PIV in right AC. Lisinopril held this AM per PACU. ASA given. Plan for L CEA this AM @ 1040. Plans to return to floor post-procedure.

## 2023-06-23 NOTE — ANESTHESIA PROCEDURE NOTES
Arterial Line Procedure Note    Pre-Procedure   Staff -        Anesthesiologist:  Mc Rico MD       Performed By: Anesthesiologist       Location: pre-op       Pre-Anesthestic Checklist: patient identified, IV checked, risks and benefits discussed, informed consent, monitors and equipment checked and pre-op evaluation  Timeout:       Correct Patient: Yes        Correct Procedure: Yes        Correct Site: Yes        Correct Position: Yes   Line Placement:   This line was placed Pre Induction starting at 6/23/2023 10:30 AM and ending at 6/23/2023 10:38 AM  Procedure   Procedure: arterial line       Laterality: left       Insertion Site: brachial.  Sterile Prep        All elements of maximal sterile barrier technique followed       Patient Prep/Sterile Barriers: hand hygiene, sterile gloves, hat, mask, draped, sterile gown, draped       Skin prep: Chloraprep  Insertion/Injection        Technique: ultrasound guided and Seldinger Technique (Permanent image not saved.)        1. Ultrasound was used to evaluate the access site.       2. Artery evaluated via ultrasound for patency/adequacy.       3. Using real-time ultrasound the needle/catheter was observed entering the artery/vein.       Catheter Type/Size: 20 gauge, 12 cm  Narrative        Tegaderm dressing used.       Complications: None apparent,        Arterial waveform: Yes        IBP within 10% of NIBP: Yes   Comments:  Pt tolerated well.

## 2023-06-23 NOTE — ANESTHESIA POSTPROCEDURE EVALUATION
Patient: Rich Moffett    Procedure: Procedure(s):  LEFT CAROTID ENDARTERECTOMY WITH ELECTROENCEPHALOGRAM AND INTRAOPERATIVE ULTRASOUND       Anesthesia Type:  General    Note:  Disposition: Admission   Postop Pain Control: Uneventful            Sign Out: Well controlled pain   PONV: No   Neuro/Psych: Uneventful            Sign Out: Acceptable/Baseline neuro status   Airway/Respiratory: Uneventful            Sign Out: Acceptable/Baseline resp. status   CV/Hemodynamics: Uneventful            Sign Out: Acceptable CV status; No obvious hypovolemia; No obvious fluid overload   Other NRE: NONE   DID A NON-ROUTINE EVENT OCCUR? No    Event details/Postop Comments:  Patient doing well. Please call with questions.           Last vitals:  Vitals Value Taken Time   /98 06/23/23 1516   Temp 36.4  C (97.5  F) 06/23/23 1355   Pulse 72 06/23/23 1523   Resp 10 06/23/23 1523   SpO2 96 % 06/23/23 1523   Vitals shown include unvalidated device data.    Electronically Signed By: Mc Rico MD  June 23, 2023  4:29 PM

## 2023-06-23 NOTE — ANESTHESIA CARE TRANSFER NOTE
Patient: Rich Moffett    Procedure: Procedure(s):  LEFT CAROTID ENDARTERECTOMY WITH ELECTROENCEPHALOGRAM AND INTRAOPERATIVE ULTRASOUND       Diagnosis: Acute CVA (cerebrovascular accident) (H) [I63.9]  Diagnosis Additional Information: No value filed.    Anesthesia Type:   General     Note:    Oropharynx: oropharynx clear of all foreign objects and spontaneously breathing  Level of Consciousness: drowsy  Oxygen Supplementation: face mask  Level of Supplemental Oxygen (L/min / FiO2): 6  Independent Airway: airway patency satisfactory and stable  Dentition: dentition unchanged  Vital Signs Stable: post-procedure vital signs reviewed and stable  Report to RN Given: handoff report given  Patient transferred to: PACU    Handoff Report: Identifed the Patient, Identified the Reponsible Provider, Reviewed the pertinent medical history, Discussed the surgical course, Reviewed Intra-OP anesthesia mangement and issues during anesthesia, Set expectations for post-procedure period and Allowed opportunity for questions and acknowledgement of understanding      Vitals:  Vitals Value Taken Time   /95 06/23/23 1355   Temp     Pulse 69 06/23/23 1401   Resp 14 06/23/23 1402   SpO2 95 % 06/23/23 1402   Electronically Signed By: Brendan Jones, GINI CRNA  June 23, 2023  2:04 PM

## 2023-06-23 NOTE — ANESTHESIA PREPROCEDURE EVALUATION
Anesthesia Pre-Procedure Evaluation    Patient: Rich Moffett   MRN: [id number] : 1955        Procedure : Procedure(s):  LEFT CAROTID ENDARTERECTOMY WITH ELECTROENCEPHALOGRAM AND INTRAOPERATIVE ULTRASOUND          No past medical history on file.   No past surgical history on file.   No Known Allergies   Social History     Tobacco Use     Smoking status: Not on file     Smokeless tobacco: Not on file   Substance Use Topics     Alcohol use: Not on file      Wt Readings from Last 1 Encounters:   23 70.3 kg (155 lb)        Anesthesia Evaluation   Pt has had prior anesthetic.     No history of anesthetic complications       ROS/MED HX  ENT/Pulmonary: Comment: Sensorineural hearing loss      Neurologic: Comment: Dysarthria, no aphasia. Resolved.  suspect acute CVA (low suspicion per neurology).   Severe L ICA stenosis: likely asymptomatic and  not the source of dysarthria per neuro.No hx of CVA.    Suspected cognitive issues    Insomnia    MRI 23    IMPRESSION:  1.  No findings to explain patient's symptoms. No acute intracranial pathology.  2.  Minimal chronic small vessel ischemic disease.    23 CT    IMPRESSION:   HEAD CT:  1.  No acute intracranial abnormality.     HEAD CTA:   1.  All of the major large-caliber proximal intracranial vessels are patent without definite aneurysm or AVM.  2.  Mild to moderate calcific atheromatous changes in both carotid siphons.     NECK CTA:  1.  There are 2 somewhat irregular atheromatous stenoses in the proximal left ICA. The more proximal stenosis at the origin of the left ICA is in the 60% range. At the distal aspect of the left carotid bulb is a more severe stenosis in the 85% range.   Flow is preserved distally in the left ICA without dissection changes.  2.  No significant stenosis or dissection in the right carotid system or in either vertebral artery in the neck.     CT PERFUSION:  1.  Normal cerebral perfusion.    (+) TIA,     Cardiovascular:  Comment: 6/20/23 cardiac echo    Interpretation Summary     Left ventricular systolic function is normal.  The visual ejection fraction is 60-65%.  There is mild concentric left ventricular hypertrophy.  The right ventricle is normal in structure, function and size.  No significant valve dysfunction.  The inferior vena cava was normal in size with preserved respiratory  variability.  There is no pericardial effusion.     No prior study for comparison.  ______________________________________________________________________________  Left Ventricle  The left ventricle is normal in size. There is mild concentric left  ventricular hypertrophy. Left ventricular systolic function is normal. The  visual ejection fraction is 60-65%. Normal left ventricular wall motion.     Right Ventricle  The right ventricle is normal in structure, function and size.     Atria  Normal left atrial size. Right atrial size is normal.     Mitral Valve  There is mild mitral annular calcification.     Tricuspid Valve  The tricuspid valve is normal in structure and function. The right ventricular  systolic pressure is approximated at 35mmHg plus the right atrial pressure.     Aortic Valve  There is mild trileaflet aortic sclerosis.     Pulmonic Valve  The pulmonic valve is normal in structure and function.     Vessels  The aortic root is normal size. Normal size ascending aorta. The inferior vena  cava was normal in size with preserved respiratory variability.     Pericardium  There is no pericardial effusion.    (+) hypertension-----    METS/Exercise Tolerance:     Hematologic: Comments: Leukocytosis: WBC 13.6       Musculoskeletal:  - neg musculoskeletal ROS     GI/Hepatic:     (+) GERD,     Renal/Genitourinary:  - neg Renal ROS     Endo:  - neg endo ROS     Psychiatric/Substance Use:  - neg psychiatric ROS     Infectious Disease:  - neg infectious disease ROS     Malignancy:       Other:            Physical Exam    Airway  airway exam normal       Mallampati: II   TM distance: > 3 FB   Neck ROM: full   Mouth opening: > 3 cm    Respiratory Devices and Support         Dental     Comment: Missing most teeth.    (+) Edentulous      Cardiovascular   cardiovascular exam normal          Pulmonary   pulmonary exam normal                OUTSIDE LABS:  CBC:   Lab Results   Component Value Date    WBC 5.7 06/21/2023    WBC 13.6 (H) 06/20/2023    HGB 14.2 06/21/2023    HGB 14.7 06/20/2023    HCT 42.7 06/21/2023    HCT 44.6 06/20/2023     06/21/2023     06/20/2023     BMP:   Lab Results   Component Value Date     06/21/2023     06/20/2023    POTASSIUM 3.9 06/21/2023    POTASSIUM 4.1 06/20/2023    CHLORIDE 100 06/21/2023    CHLORIDE 101 06/20/2023    CO2 25 06/21/2023    CO2 24 06/20/2023    BUN 17.6 06/21/2023    BUN 19.6 06/20/2023    CR 1.09 06/21/2023    CR 1.09 06/20/2023     (H) 06/21/2023    GLC 87 06/21/2023     COAGS:   Lab Results   Component Value Date    PTT 24 06/20/2023    INR 1.01 06/20/2023     POC: No results found for: BGM, HCG, HCGS  HEPATIC:   Lab Results   Component Value Date    ALBUMIN 4.7 04/17/2023    PROTTOTAL 7.1 04/17/2023    ALT 26 04/17/2023    AST 21 04/17/2023    ALKPHOS 57 04/17/2023    BILITOTAL 1.3 (H) 04/17/2023     OTHER:   Lab Results   Component Value Date    A1C 5.9 (H) 06/20/2023    JASMINE 9.3 06/21/2023    TSH 0.58 06/21/2023       Anesthesia Plan    ASA Status:  3   NPO Status:  NPO Appropriate    Anesthesia Type: General.     - Airway: ETT   Induction: Intravenous.   Maintenance: Balanced.   Techniques and Equipment:     - Airway: Video-Laryngoscope     - Lines/Monitors: 2nd IV, Arterial Line     Consents    Anesthesia Plan(s) and associated risks, benefits, and realistic alternatives discussed. Questions answered and patient/representative(s) expressed understanding.    - Discussed:     - Discussed with:  Patient         Postoperative Care    Pain management: IV analgesics, Multi-modal analgesia.    PONV prophylaxis: Ondansetron (or other 5HT-3), Dexamethasone or Solumedrol     Comments:                Mc Rico MD

## 2023-06-23 NOTE — PROVIDER NOTIFICATION
"MD Notification    Notified Person: MD    Notified Person Name: Dr. Hardy    Notification Date/Time: 6/23/23 at 1644    Notification Interaction: web based page    Purpose of Notification: \"Pt is back from CEA. Lisinopril ordered, but states hes been taking HCTZ in placement of. Do you want us to continue with lisinopril or do you want to change? Thanks Sania STEWART *[id number]\"    Orders Received: Continue with lisinopril while IP; plan to resume hydrochlorothiazide-lisinopril upon discharge    Comments:    "

## 2023-06-23 NOTE — PLAN OF CARE
Reason for Admission: L ICA stenosis    Cognitive/Mentation: A/Ox 4  Neuros/CMS: Intact ex slurred speech  VS: stable on RA.   Tele: SBA.  GI: Continent.  : Continent.  Pulmonary: LS clear.  Pain: denies.     Drains/Lines: R PIV - NS @  75 ml/hr  Skin: intact  Activity: Assist x 1 with SBA.  Diet: NPO.     Therapies recs: pending  Discharge: pending    Aggression Stoplight Tool: green    End of shift summary: plan for L CEA today @ 10:40.

## 2023-06-24 ENCOUNTER — APPOINTMENT (OUTPATIENT)
Dept: PHYSICAL THERAPY | Facility: CLINIC | Age: 68
DRG: 037 | End: 2023-06-24
Attending: PHYSICIAN ASSISTANT
Payer: COMMERCIAL

## 2023-06-24 VITALS
TEMPERATURE: 97.8 F | HEART RATE: 78 BPM | BODY MASS INDEX: 24.91 KG/M2 | SYSTOLIC BLOOD PRESSURE: 149 MMHG | DIASTOLIC BLOOD PRESSURE: 94 MMHG | WEIGHT: 155 LBS | HEIGHT: 66 IN | RESPIRATION RATE: 16 BRPM | OXYGEN SATURATION: 97 %

## 2023-06-24 LAB — GLUCOSE BLDC GLUCOMTR-MCNC: 113 MG/DL (ref 70–99)

## 2023-06-24 PROCEDURE — 97116 GAIT TRAINING THERAPY: CPT | Mod: GP

## 2023-06-24 PROCEDURE — 97161 PT EVAL LOW COMPLEX 20 MIN: CPT | Mod: GP

## 2023-06-24 PROCEDURE — 250N000013 HC RX MED GY IP 250 OP 250 PS 637: Performed by: HOSPITALIST

## 2023-06-24 PROCEDURE — 99239 HOSP IP/OBS DSCHRG MGMT >30: CPT | Performed by: HOSPITALIST

## 2023-06-24 PROCEDURE — 97530 THERAPEUTIC ACTIVITIES: CPT | Mod: GP

## 2023-06-24 PROCEDURE — 250N000013 HC RX MED GY IP 250 OP 250 PS 637: Performed by: SURGERY

## 2023-06-24 RX ORDER — LISINOPRIL 20 MG/1
20 TABLET ORAL DAILY
Qty: 30 TABLET | Refills: 3 | Status: SHIPPED | OUTPATIENT
Start: 2023-06-24 | End: 2023-06-24

## 2023-06-24 RX ORDER — ACETAMINOPHEN 325 MG/1
650 TABLET ORAL EVERY 4 HOURS PRN
COMMUNITY
Start: 2023-06-24

## 2023-06-24 RX ORDER — CLOPIDOGREL BISULFATE 75 MG/1
75 TABLET ORAL DAILY
Qty: 30 TABLET | Refills: 0 | Status: SHIPPED | OUTPATIENT
Start: 2023-06-24 | End: 2024-02-13

## 2023-06-24 RX ORDER — ASPIRIN 81 MG/1
81 TABLET ORAL DAILY
Qty: 120 TABLET | Refills: 3 | Status: SHIPPED | OUTPATIENT
Start: 2023-06-24

## 2023-06-24 RX ORDER — ATORVASTATIN CALCIUM 80 MG/1
80 TABLET, FILM COATED ORAL EVERY EVENING
Qty: 30 TABLET | Refills: 3 | Status: SHIPPED | OUTPATIENT
Start: 2023-06-24 | End: 2024-02-12 | Stop reason: ALTCHOICE

## 2023-06-24 RX ADMIN — LISINOPRIL 20 MG: 20 TABLET ORAL at 10:00

## 2023-06-24 RX ADMIN — CLOPIDOGREL BISULFATE 75 MG: 75 TABLET ORAL at 10:00

## 2023-06-24 RX ADMIN — ACETAMINOPHEN 650 MG: 325 TABLET ORAL at 01:51

## 2023-06-24 RX ADMIN — Medication 1 LOZENGE: at 02:20

## 2023-06-24 RX ADMIN — ASPIRIN 325 MG ORAL TABLET 325 MG: 325 PILL ORAL at 10:00

## 2023-06-24 RX ADMIN — ACETAMINOPHEN 650 MG: 325 TABLET ORAL at 10:00

## 2023-06-24 ASSESSMENT — ACTIVITIES OF DAILY LIVING (ADL)
ADLS_ACUITY_SCORE: 24
ADLS_ACUITY_SCORE: 22
ADLS_ACUITY_SCORE: 24
ADLS_ACUITY_SCORE: 24
ADLS_ACUITY_SCORE: 22

## 2023-06-24 NOTE — PLAN OF CARE
"[id number]: Pt arrived back to unit floor at approx. 1500. POD0 of left-sided CEA. A&Ox4. Neuros unchanged from prior to surgery: slurred speech with slow speech noted at times. Incision to left side of neck covered with dressing; drainage marked. Expanding slowly throughout shift; marked last at 1855. O2 weaned to RA. Intermittent SBP > 160 parameters; given PRN labetalol x1 this shift and lisinopril resumed this shift. Other VSS. Up with assist x1 w/ GB. Reported feeling \"wobbly\" but denied dizziness and gait appeared steady. Tolerated full liquid diet upon arrival to unit floor. Denied nausea. Tolerated light dinner tonight. Pain to incision site upon arrival 7/10. Ice pack applied, pain decreased to 4/10. PRN Tylenol given with new ice pack and pt now reports pain 1/10. Discharge plan pending. Plan to continue with close monitoring overnight.    "

## 2023-06-24 NOTE — DISCHARGE SUMMARY
"Discharge Summary    Rich Moffett MRN# [id number]   YOB: 1955 Age: 67 year old     Date of Admission:  6/20/2023  Date of Discharge:  6/24/2023  Admitting Physician:  Misty Goodman MD  Discharge Physician:  Izabel Hardy MD Pager [phone] Office [phone]  Discharging Service:  [facility] Hospitalist Service     Primary Provider: No Ref-Primary, Physician          Discharge Diagnosis:   See problem-oriented hospital course for diagnoses addressed during this hospital stay.             Discharge Disposition:   Discharged to home          Condition on Discharge:   Discharge condition: Stable   Discharge vitals: Blood pressure (!) 168/100, pulse 77, temperature 98.2  F (36.8  C), temperature source Oral, resp. rate 16, height 1.676 m (5' 6\"), weight 70.3 kg (155 lb), SpO2 96 %.   Code status on discharge: Full Code          Discharge Medications:        Review of your medicines      START taking      Dose / Directions   acetaminophen 325 MG tablet  Commonly known as: TYLENOL      Dose: 650 mg  Take 2 tablets (650 mg) by mouth every 4 hours as needed for mild pain  Refills: 0     aspirin 81 MG EC tablet      Dose: 81 mg  Take 1 tablet (81 mg) by mouth daily  Quantity: 120 tablet  Refills: 3     atorvastatin 80 MG tablet  Commonly known as: LIPITOR  Used for: Hyperlipidemia LDL goal <70      Dose: 80 mg  Take 1 tablet (80 mg) by mouth every evening  Quantity: 30 tablet  Refills: 3     clopidogrel 75 MG tablet  Commonly known as: PLAVIX  Notes to patient: This medication was ordered for 1 month only.      Dose: 75 mg  Take 1 tablet (75 mg) by mouth daily  Quantity: 30 tablet  Refills: 0        CONTINUE these medicines which have NOT CHANGED      Dose / Directions   diclofenac 1 % topical gel  Commonly known as: VOLTAREN      Dose: 2 g  Apply 2 g topically 4 times daily as needed for moderate pain  Refills: 0     lisinopril-hydrochlorothiazide 20-12.5 MG tablet  Commonly known as: ZESTORETIC   "    Dose: 1 tablet  Take 1 tablet by mouth daily  Refills: 0           Where to get your medicines      These medications were sent to [facility] [place], [place] - [address] [facility]  [address] [facility] [address]    Phone: [phone]     aspirin 81 MG EC tablet    atorvastatin 80 MG tablet    clopidogrel 75 MG tablet     Some of these will need a paper prescription and others can be bought over the counter. Ask your nurse if you have questions.    You don't need a prescription for these medications    acetaminophen 325 MG tablet               Consultations:   Neurology.  Vascular surgery.        Brief Hx and Hospital Course:     Rich Moffett is a 67 year old male with below PMHX who presented to [facility] ED 6/20/23 after acute onset dysarthria with concern for acute CVA in the setting of L ICA stenosis. Transferred for Neurology and Vascular Surgery consultation.      Speech disturbances - family reports dysarthria, not clearly aphasic   Severe left ICA stenosis - likely asymptomatic as presentation not concerning for TIA or stroke as evidenced on imaging   Underlying neurocognitive disorder     Dysarthria, no aphasia. Resolved.  suspect acute CVA (low suspicion per neurology).   Severe L ICA stenosis: likely asymptomatic and  not the source of dysarthria per neuro.No hx of CVA.  Neg imaging for CVA.  A1C 5.9, trop 7. TTE unremarkable.  Vascular surgery consulted. S/P CEA of L ICA on 6/23/23..  - Post-op antiplatelet regimen per vascular surgery (baby ASA and plavix)..  - Follow up with vascular surgery.  -  Follow vasc-surg discharge instructions...     Suspected cognitive issues. Oriented x3.: VIT B12, TSH wnl. Treponema non reactive.  - Follow up with neurology.  - Delirium precautions.     Leukocytosis: WBC 13.6 on admission. Suspect reactive in the setting of above. No infectious sx reported on admission.   Resolved.    Described orthostasis: Reports intermittent bouts  of positional dizziness at home. No episodes of true syncope.  Remained asymptomatic here.  - Review BP meds if recurs (defer to PCP).  - Hydration.  - Fall precautions.     Hypertension:  Resume PTA meds.    Sever protein-calorie malnutrition:  - Nutrition consulted.     Chronic, stable medical conditions, not on meds PTA  GERD  Insomnia  Sensorineural hearing loss    Pt is being discharged home in stable conditions. Outpatient PT ordered.     DISCHARGE EXAM:   Temp:  [97.1  F (36.2  C)-98.2  F (36.8  C)] 98.2  F (36.8  C)  Pulse:  [66-92] 77  Resp:  [10-29] 16  BP: (120-187)/() 168/100  MAP:  [113 mmHg-128 mmHg] 128 mmHg  Arterial Line BP: (153-177)/(83-93) 177/93  SpO2:  [94 %-98 %] 96 %  Wt Readings from Last 5 Encounters:   06/23/23 70.3 kg (155 lb)   06/20/23 70.3 kg (155 lb)          Pertinent Tests and Procedures:   Per chart.             Pending Results:   Surgical pathology report.          Discharge Instructions and Follow-Up:   Discharge diet: Orders Placed This Encounter      Regular Diet Adult      Diet     Discharge activity: Activity as tolerated   Discharge follow-up: Follow up with primary care provider, neurology, vascular surgery.   Outpatient therapy: None    Home Care agency: None    Supplies and equipment: None   Lines and drains: None    Wound care: None   Other instructions: None      More than 30 minutes were required for coordination of care for this discharge.         Procedures / Labs / Imaging:     Results for orders placed or performed during the hospital encounter of 06/20/23    Intraoperative     Status: None    Narrative    This exam was marked as non-reportable because it will not be read by a   radiologist or a [place] non-radiologist provider.         Troponin T, High Sensitivity     Status: Normal   Result Value Ref Range    Troponin T, High Sensitivity 7 <=22 ng/L   Hemoglobin A1c     Status: Abnormal   Result Value Ref Range    Hemoglobin A1C 5.9 (H) <5.7 %   Lipid  panel reflex to direct LDL: Non-fasting     Status: Abnormal   Result Value Ref Range    Cholesterol 190 <200 mg/dL    Triglycerides 72 <150 mg/dL    Direct Measure HDL 45 >=40 mg/dL    LDL Cholesterol Calculated 131 (H) <=100 mg/dL    Non HDL Cholesterol 145 (H) <130 mg/dL    Narrative    Cholesterol  Desirable:  <200 mg/dL    Triglycerides  Normal:  Less than 150 mg/dL  Borderline High:  150-199 mg/dL  High:  200-499 mg/dL  Very High:  Greater than or equal to 500 mg/dL    Direct Measure HDL  Female:  Greater than or equal to 50 mg/dL   Male:  Greater than or equal to 40 mg/dL    LDL Cholesterol  Desirable:  <100mg/dL  Above Desirable:  100-129 mg/dL   Borderline High:  130-159 mg/dL   High:  160-189 mg/dL   Very High:  >= 190 mg/dL    Non HDL Cholesterol  Desirable:  130 mg/dL  Above Desirable:  130-159 mg/dL  Borderline High:  160-189 mg/dL  High:  190-219 mg/dL  Very High:  Greater than or equal to 220 mg/dL   Asymptomatic COVID-19 Virus (Coronavirus) by PCR Nose     Status: Normal    Specimen: Nose; Swab   Result Value Ref Range    SARS CoV2 PCR Negative Negative    Narrative    Testing was performed using the Xpert Xpress SARS-CoV-2 Assay on the Cepheid Gene-Xpert Instrument Systems. Additional information about this Emergency Use Authorization (EUA) assay can be found via the Lab Guide. This test should be ordered for the detection of SARS-CoV-2 in individuals who meet SARS-CoV-2 clinical and/or epidemiological criteria as well as from individuals without symptoms or other reasons to suspect COVID-19. Test performance for asymptomatic patients has only been established in anterior nasal swab specimens. This test is for in vitro diagnostic use under the FDA EUA for laboratories certified under CLIA to perform high complexity testing. This test has not been FDA cleared or approved. A negative result does not rule out the presence of PCR inhibitors in the specimen or target RNA concentration below the limit of  detection for the assay. The possibility of a false negative should be considered if the patient's recent exposure or clinical presentation suggests COVID-19. This test was validated by the [facility]. This laboratory is certified under the Clinical Laboratory Improvement Amendments (CLIA) as qualified to perform high complexity laboratory testing.     Glucose by meter     Status: Abnormal   Result Value Ref Range    GLUCOSE BY METER POCT 102 (H) 70 - 99 mg/dL   Glucose by meter     Status: Normal   Result Value Ref Range    GLUCOSE BY METER POCT 96 70 - 99 mg/dL   CBC with platelets     Status: Normal   Result Value Ref Range    WBC Count 5.7 4.0 - 11.0 10e3/uL    RBC Count 5.06 4.40 - 5.90 10e6/uL    Hemoglobin 14.2 13.3 - 17.7 g/dL    Hematocrit 42.7 40.0 - 53.0 %    MCV 84 78 - 100 fL    MCH 28.1 26.5 - 33.0 pg    MCHC 33.3 31.5 - 36.5 g/dL    RDW 13.2 10.0 - 15.0 %    Platelet Count 192 150 - 450 10e3/uL   Basic metabolic panel     Status: Normal   Result Value Ref Range    Sodium 136 136 - 145 mmol/L    Potassium 3.9 3.4 - 5.3 mmol/L    Chloride 100 98 - 107 mmol/L    Carbon Dioxide (CO2) 25 22 - 29 mmol/L    Anion Gap 11 7 - 15 mmol/L    Urea Nitrogen 17.6 8.0 - 23.0 mg/dL    Creatinine 1.09 0.67 - 1.17 mg/dL    Calcium 9.3 8.8 - 10.2 mg/dL    Glucose 87 70 - 99 mg/dL    GFR Estimate 75 >60 mL/min/1.73m2   Vitamin B1 whole blood     Status: None   Result Value Ref Range    Vitamin B1 Whole Blood Level 116 70 - 180 nmol/L   Vitamin B12     Status: Normal   Result Value Ref Range    Vitamin B12 738 232 - 1,245 pg/mL   Folate     Status: Normal   Result Value Ref Range    Folic Acid 7.1 4.6 - 34.8 ng/mL   HIV Antigen Antibody Combo     Status: Normal   Result Value Ref Range    HIV Antigen Antibody Combo Nonreactive Nonreactive   Treponema Abs w Reflex to RPR and Titer     Status: Normal   Result Value Ref Range    Treponema Antibody Total Nonreactive Nonreactive   TSH with  free T4 reflex     Status: Normal   Result Value Ref Range    TSH 0.58 0.30 - 4.20 uIU/mL   Glucose by meter     Status: Abnormal   Result Value Ref Range    GLUCOSE BY METER POCT 102 (H) 70 - 99 mg/dL   CBC with platelets     Status: Abnormal   Result Value Ref Range    WBC Count 8.1 4.0 - 11.0 10e3/uL    RBC Count 6.04 (H) 4.40 - 5.90 10e6/uL    Hemoglobin 17.0 13.3 - 17.7 g/dL    Hematocrit 51.4 40.0 - 53.0 %    MCV 85 78 - 100 fL    MCH 28.1 26.5 - 33.0 pg    MCHC 33.1 31.5 - 36.5 g/dL    RDW 13.2 10.0 - 15.0 %    Platelet Count 238 150 - 450 10e3/uL   Basic metabolic panel     Status: Abnormal   Result Value Ref Range    Sodium 141 136 - 145 mmol/L    Potassium 4.6 3.4 - 5.3 mmol/L    Chloride 106 98 - 107 mmol/L    Carbon Dioxide (CO2) 23 22 - 29 mmol/L    Anion Gap 12 7 - 15 mmol/L    Urea Nitrogen 18.8 8.0 - 23.0 mg/dL    Creatinine 1.04 0.67 - 1.17 mg/dL    Calcium 9.5 8.8 - 10.2 mg/dL    Glucose 107 (H) 70 - 99 mg/dL    GFR Estimate 79 >60 mL/min/1.73m2   Glucose by meter     Status: Abnormal   Result Value Ref Range    GLUCOSE BY METER POCT 113 (H) 70 - 99 mg/dL   EKG 12-lead, tracing only     Status: None   Result Value Ref Range    Systolic Blood Pressure  mmHg    Diastolic Blood Pressure  mmHg    Ventricular Rate 75 BPM    Atrial Rate 75 BPM    OR Interval 176 ms    QRS Duration 78 ms     ms    QTc 446 ms    P Axis 58 degrees    R AXIS -25 degrees    T Axis 50 degrees    Interpretation ECG       Sinus rhythm  Normal ECG  When compared with ECG of 20-JUN-2023 10:41, (unconfirmed)  No significant change was found  Confirmed by MD CORAZON, MALIHA ([id number]),  Denise Cordova ([id number]) on 6/22/2023 8:07:17 AM     Echocardiogram Complete - For age > 60 yrs     Status: None   Result Value Ref Range    LVEF  60-65%     Narrative    [id number]  [id number]  [id number]  [id number]^MICKEY^MARIAA     [facility]  Echocardiography Laboratory  [address] [place] [address]     Name:  HELEN CAMPBELL  MRN: [id number]  : 1955  Study Date: 2023 03:09 PM  Age: 67 yrs  Gender: Male  Patient Location: [facility]  Reason For Study: Cerebrovascular Incident  Ordering Physician: NISHA AREVALO  Referring Physician: JP MCKEON  Performed By: Odell Hager     BSA: 1.8 m2  Height: 66 in  Weight: 155 lb  HR: 72  BP: 121/88 mmHg  ______________________________________________________________________________  Procedure  Complete Portable Echo Adult.  ______________________________________________________________________________  Interpretation Summary     Left ventricular systolic function is normal.  The visual ejection fraction is 60-65%.  There is mild concentric left ventricular hypertrophy.  The right ventricle is normal in structure, function and size.  No significant valve dysfunction.  The inferior vena cava was normal in size with preserved respiratory  variability.  There is no pericardial effusion.     No prior study for comparison.  ______________________________________________________________________________  Left Ventricle  The left ventricle is normal in size. There is mild concentric left  ventricular hypertrophy. Left ventricular systolic function is normal. The  visual ejection fraction is 60-65%. Normal left ventricular wall motion.     Right Ventricle  The right ventricle is normal in structure, function and size.     Atria  Normal left atrial size. Right atrial size is normal.     Mitral Valve  There is mild mitral annular calcification.     Tricuspid Valve  The tricuspid valve is normal in structure and function. The right ventricular  systolic pressure is approximated at 35mmHg plus the right atrial pressure.     Aortic Valve  There is mild trileaflet aortic sclerosis.     Pulmonic Valve  The pulmonic valve is normal in structure and function.     Vessels  The aortic root is normal size. Normal size ascending aorta. The inferior vena  cava was normal in size  with preserved respiratory variability.     Pericardium  There is no pericardial effusion.     ______________________________________________________________________________  MMode/2D Measurements & Calculations  IVSd: 1.1 cm  LVIDd: 3.8 cm  LVIDs: 2.2 cm  LVPWd: 1.1 cm  FS: 42.1 %  LV mass(C)d: 134.7 grams  LV mass(C)dI: 75.0 grams/m2     Ao root diam: 3.3 cm  LA dimension: 3.5 cm  asc Aorta Diam: 3.4 cm  LA/Ao: 1.1  LVOT diam: 2.0 cm  LVOT area: 3.1 cm2  LA Volume (BP): 36.3 ml  LA Volume Index (BP): 20.3 ml/m2  RWT: 0.58  TAPSE: 4.0 cm     Doppler Measurements & Calculations  MV E max salvador: 99.0 cm/sec  MV A max salvador: 71.8 cm/sec  MV E/A: 1.4     MV dec slope: 651.0 cm/sec2  MV dec time: 0.15 sec  PA acc time: 0.12 sec  TR max salvador: 272.3 cm/sec  TR max P.0 mmHg  E/E' av.0  Lateral E/e': 7.2  Medial E/e': 8.8  RV S Salvador: 16.5 cm/sec     ______________________________________________________________________________  Report approved by: Fran Sin 2023 04:07 PM         Adult Type and Screen     Status: None   Result Value Ref Range    ABO/RH(D) A POS     Antibody Screen Negative Negative    SPECIMEN EXPIRATION DATE [id number]    ABO/Rh type and screen     Status: None    Narrative    The following orders were created for panel order ABO/Rh type and screen.  Procedure                               Abnormality         Status                     ---------                               -----------         ------                     Adult Type and Screen[[id number]]                            Final result                 Please view results for these tests on the individual orders.

## 2023-06-24 NOTE — PROGRESS NOTES
Vascular Surgery Progress Note:  POD#1   Left CEA    S: Very comfortable this morning.  No complaints.    O:   Vitals:  BP  Min: 120/93  Max: 187/106  Temp  Av.6  F (36.4  C)  Min: 97.1  F (36.2  C)  Max: 98.2  F (36.8  C)  Pulse  Av.4  Min: 62  Max: 92  I/O last 3 completed shifts:  In:  [P.O.:240; I.V.:]  Out: 100 [Blood:100]    Physical Exam: Alert and appropriate.   In physical therapy going up and down stairs with no difficulty.   Left neck incision=A   Outer dressing removed.  Steri-Strips intact.   CN XII and Neuro=A    Admit LDL= 131      Assessment/Plan: #1.  Doing very well following symptomatic left CEA.  Aspirin indefinitely.  Plavix for 1 month.       #2.  Control risk factors is critical.  Note elevated LDL.  On Lipitor 80 mg daily.       #3.  Hypertension.  Initiated on lisinopril.  Follow blood pressures at home.    May be discharged home from vascular standpoint.  We will follow-up with Dr. Macdonald in our office in 2 weeks with carotid duplex scheduled in 3 months.  Discussed with patient.          Wm. Tristan MD

## 2023-06-24 NOTE — PROGRESS NOTES
Patient is alert and oriented x4, vital signs stable. Neuro assessment intact, no deficits noted. Left neck surgical incision intact with steri-strips, no drainage noted. Patient is discharging to home with PT ordered outpatient. Daughter will be providing the ride and help at home. Patient verbalizes understanding of discharge information and received discharge medications.     Eleonora Leavitt RN on 6/24/2023 at 1:50 PM

## 2023-06-24 NOTE — PROVIDER NOTIFICATION
"Paged Dr. Carmona \"FYI. Pt's dressing from CEA this afternoon is saturated but not leaking at this point. Unable to do much for reinforcement as the entire dressing is covered with tegaderm.\"    Addendum: \"Ok. Monitor\"  "

## 2023-06-24 NOTE — PROGRESS NOTES
"   06/24/23 0836   Appointment Info   Signing Clinician's Name / Credentials (PT) Kaleb Gutierrez, PT, DPT   Living Environment   People in Home friend(s);child(olesya), dependent  (son who is 5, god son who is 43)   Current Living Arrangements house   Home Accessibility stairs to enter home;stairs within home   Number of Stairs, Main Entrance 8   Stair Railings, Main Entrance   (4 steps with bilateral handrails, 4 steps with no handrails)   Number of Stairs, Within Home, Primary greater than 10 stairs  (12)   Stair Railings, Within Home, Primary none   Transportation Anticipated other (see comments)  (patient has never driven, walks around town)   Living Environment Comments Lives in house with son who is 5 and god son who is 40 years old. Has walk in shower at home with grab bars. God son can do laundry in basement for patient   Self-Care   Usual Activity Tolerance good   Current Activity Tolerance moderate   Regular Exercise Yes   Activity/Exercise Type walking   Exercise Amount/Frequency daily   Equipment Currently Used at Home none   Fall history within last six months no   Activity/Exercise/Self-Care Comment Pt reports indep with functional mobility and self cares. Pt cares for son who is 5 years old and has autism. Son in law states, \"he is a handful\". Pt is retired, not working. Does not drive so walks \"all over town\" per pt report. Pt responsible for IADLs for home but states, \"sometimes my ex doesn't think my cooking is good enough and she will come cook.\"   General Information   Onset of Illness/Injury or Date of Surgery 06/20/23   Referring Physician Kimberli Macdonald MD   Patient/Family Therapy Goals Statement (PT) Patient would like to return home   Pertinent History of Current Problem (include personal factors and/or comorbidities that impact the POC) 67 year old male with below PMHX who presented to [facility] 6/20/23 after acute onset dysarthria with concern for acute CVA in the setting of L " ICA stenosis. Transferred for Neurology and Vascular Surgery consultation. POD #1 L sided CEA   Existing Precautions/Restrictions fall   Cognition   Affect/Mental Status (Cognition) WFL   Orientation Status (Cognition) oriented x 4   Follows Commands (Cognition) WFL   Pain Assessment   Patient Currently in Pain Yes, see Vital Sign flowsheet  (neck pain following CEA)   Strength (Manual Muscle Testing)   Strength Comments 5/5 LE strength   Bed Mobility   Bed Mobility supine-sit;sit-supine   Supine-Sit Bennett (Bed Mobility) modified independence   Sit-Supine Bennett (Bed Mobility) modified independence   Comment, (Bed Mobility) mod I supine<>sit transfers   Transfers   Transfers sit-stand transfer   Sit-Stand Transfer   Sit-Stand Bennett (Transfers) supervision   Comment, (Sit-Stand Transfer) sit<>stand transfers with supervision and no assistive device.   Gait/Stairs (Locomotion)   Bennett Level (Gait) supervision   Distance in Feet 10' eval   Distance in Feet (Gait) 100', 200', 12 steps   Comment, (Gait/Stairs) ambulating with no AD and initial SBA   Balance   Balance Comments no balance impairments noted   Sensory Examination   Sensory Perception patient reports no sensory changes   Clinical Impression   Criteria for Skilled Therapeutic Intervention Yes, treatment indicated   PT Diagnosis (PT) impaired mobility   Influenced by the following impairments weakness, reduced activity tolerance   Functional limitations due to impairments impaired functional moblity, impaired gait and stair navigation   Clinical Presentation (PT Evaluation Complexity) Stable/Uncomplicated   Clinical Presentation Rationale clinical judgement   Clinical Decision Making (Complexity) low complexity   Planned Therapy Interventions (PT) balance training;bed mobility training;gait training;neuromuscular re-education;patient/family education;stair training;strengthening;transfer training;progressive activity/exercise   Risk &  Benefits of therapy have been explained evaluation/treatment results reviewed;care plan/treatment goals reviewed;risks/benefits reviewed;current/potential barriers reviewed;participants voiced agreement with care plan;participants included;patient   PT Total Evaluation Time   PT Eval, Low Complexity Minutes (14570) 7   Plan of Care Review   Plan of Care Reviewed With patient   Physical Therapy Goals   PT Frequency One time eval and treatment only   PT Predicted Duration/Target Date for Goal Attainment 06/28/23   PT Goals Bed Mobility;Transfers;Gait;Stairs   PT: Bed Mobility Independent;Supine to/from sit   PT: Transfers Independent;Sit to/from stand   PT: Gait Modified independent;Greater than 200 feet   PT: Stairs Modified independent;8 stairs  (with and without rails)   Interventions   Interventions Quick Adds Gait Training;Therapeutic Activity   Therapeutic Activity   Therapeutic Activities: dynamic activities to improve functional performance Minutes (48642) 8   Symptoms Noted During/After Treatment Fatigue;Increased pain   Treatment Detail/Skilled Intervention Patient supine in bed at beginning of session, agreeable to participate in PT. Patient stating that he feels much better and would like to go home with family. Performing supine<>sit transfers with mod I and HOB elevated. Performing sit<>stand transfers with initial supervision and no assistive device. Progressed to mod I. Patient completing bouts of ambulation with mild fatigue. Patient performing sit<>stand from toilet with supervision and no assistive device. Ambulating throughout room and in between objects, able to move tray around in room all with supervision. Vitals checked at beginning of session with HR 80-90 bpm at rest, SpO2 >97%, and BP initially 172/100 mmHg. Checked again 1 minute later and at 153/85 mmHg. Patient supine in bed at end of session with call light next to him and bed alarm on.   Gait Training   Gait Training Minutes (89613) 9    Symptoms Noted During/After Treatment (Gait Training) fatigue   Treatment Detail/Skilled Intervention Patient completing two bouts of ambulation for 100' and 200' with initial SBA and no assistive device. Progressed to supervision during bout. Good step length and gait velocity, steady while ambulating. Noted decreased arm swing and shrugging shoulders, cues for relaxing at shoulders. Patient completing bout of stair navigation x12 steps with initial bilateral UE support on handrails and progressed to no handrails, supervision-SBA provided. Cues for step to pattern when descending with no handrails. No notable instability or LOB throughout.   PT Discharge Planning   PT Plan d/c from IP PT, plans to discharge home today   PT Discharge Recommendation (DC Rec) home with assist   PT Rationale for DC Rec Patient near baseline functional mobility. Mobilizing well during session with mod I-supervision and no assistive device. Lives with 43 year old god son and 4 year old son in house with 8 steps to enter. Recommend discharge home with supervision and assist for initial mobility and cares as needed.   PT Brief overview of current status mod I-supervision for mobility with no assistive device.   Total Session Time   Timed Code Treatment Minutes 17   Total Session Time (sum of timed and untimed services) 24

## 2023-06-24 NOTE — PROVIDER NOTIFICATION
Dr. Hardy paged: Vascular surg and PT saw patient this morning. just verifying that patient is ok for discharge? Thanks.     Eleonora Leavitt RN on 6/24/2023 at 9:50 AM

## 2023-06-24 NOTE — PLAN OF CARE
Physical Therapy Discharge Summary    Reason for therapy discharge:    Discharged to home with assist.    Progress towards therapy goal(s). See goals on Care Plan in [facility] electronic health record for goal details.  Goals met    Therapy recommendation(s):    Continue home exercise program.

## 2023-06-24 NOTE — PLAN OF CARE
Reason for Admission: POD 1 CEA     Cognitive/Mentation: A&O x 4  Neuros/CMS: Intact ex slurred speech due to lack of teeth  VS: Stable on RA  Tele: NSR. Tele discontinued this AM  GI: BS active, + flatus. Continent.  : Voiding. Continent. Last BM 6/23  Pulmonary: LS clear.  Pain: 3-4/10 on carotid site. Given PRN tylenol and ice      Drains/Lines: 2 PIVs SL  Skin: L carotid site dressing saturated. Pt does not complain of difficulty swallowing, breathing, or speaking   Activity: Assist x1 w/ GB   Diet: Regular with thin liquids. Takes pills whole.      Therapies recs: Home w/ assist  Discharge: Pending workup      Aggression Stoplight Tool: Green     End of shift summary: SBP <160. Pt BP elevated to 170, given PRN hydralazine. Pt reported sore throat/phlegm buildup in throat. Given PRN lozenge

## 2023-06-26 LAB
PATH REPORT.COMMENTS IMP SPEC: NORMAL
PATH REPORT.COMMENTS IMP SPEC: NORMAL
PATH REPORT.FINAL DX SPEC: NORMAL
PATH REPORT.GROSS SPEC: NORMAL
PATH REPORT.MICROSCOPIC SPEC OTHER STN: NORMAL
PATH REPORT.RELEVANT HX SPEC: NORMAL
PHOTO IMAGE: NORMAL

## 2023-06-26 PROCEDURE — 88305 TISSUE EXAM BY PATHOLOGIST: CPT | Mod: 26 | Performed by: PATHOLOGY

## 2023-06-26 NOTE — PLAN OF CARE
"Occupational Therapy Discharge Summary    Reason for therapy discharge:    Discharged to home with home therapy.    Progress towards therapy goal(s). See goals on Care Plan in [facility] electronic health record for goal details.  Goals partially met.  Barriers to achieving goals:   discharge from facility.    Therapy recommendation(s):    Continued therapy is recommended.  Rationale/Recommendations per last OT note:  \"At this time if pt were to d/c, pt would need SBA for ambulation. Pt does not drive as baseline and cares for 5 year old son\".    "

## 2023-06-26 NOTE — PROGRESS NOTES
Speech Language Therapy Discharge Summary    Reason for therapy discharge:    Discharged to home.    Progress towards therapy goal(s). See goals on Care Plan in [facility] electronic health record for goal details.  Goals partially met.  Barriers to achieving goals:   discharge from facility.    Therapy recommendation(s):    Continued therapy is recommended.  Rationale/Recommendations:  SLP tx at home if the patient feels below baseline for chewing/swallowing.     Recommend regular solids and thin liquids, with pt self-ordering softer foods PRN. Upright with PO, slow pace, and alternate food/drink.

## 2023-06-27 ENCOUNTER — PATIENT OUTREACH (OUTPATIENT)
Dept: CARE COORDINATION | Facility: CLINIC | Age: 68
End: 2023-06-27

## 2023-06-27 NOTE — PROGRESS NOTES
Connected Care Resource Center Contact  [facility]/Voicemail     Clinical Data: Transitional Care Management Outreach     Outreach attempted x 2.  Left message on patient's voicemail, providing [facility]'s 24/7 scheduling and nurse triage phone number [phone] ([phone]) for questions/concerns and/or to schedule an appt with an [facility] provider, if they do not have a PCP.      Plan:  [facility] will do no further outreaches at this time.       Ann Marie Kumar MA  Connected Care Resource Center, [facility]    *Connected Care Resource Team does NOT follow patient ongoing. Referrals are identified based on internal discharge reports and the outreach is to ensure patient has an understanding of their discharge instructions.

## 2023-07-10 ENCOUNTER — THERAPY VISIT (OUTPATIENT)
Dept: PHYSICAL THERAPY | Facility: REHABILITATION | Age: 68
End: 2023-07-10
Attending: HOSPITALIST
Payer: COMMERCIAL

## 2023-07-10 DIAGNOSIS — I10 BENIGN ESSENTIAL HYPERTENSION: ICD-10-CM

## 2023-07-10 DIAGNOSIS — M25.60 DECREASED RANGE OF MOTION: Primary | ICD-10-CM

## 2023-07-10 DIAGNOSIS — M25.60 DECREASED RANGE OF MOTION WITH DECREASED STRENGTH: ICD-10-CM

## 2023-07-10 DIAGNOSIS — R53.1 DECREASED RANGE OF MOTION WITH DECREASED STRENGTH: ICD-10-CM

## 2023-07-10 PROCEDURE — 97110 THERAPEUTIC EXERCISES: CPT | Mod: GP

## 2023-07-10 PROCEDURE — 97161 PT EVAL LOW COMPLEX 20 MIN: CPT | Mod: GP

## 2023-07-10 NOTE — PROGRESS NOTES
"PHYSICAL THERAPY EVALUATION  Type of Visit: Evaluation    See electronic medical record for Abuse and Falls Screening details.    Subjective    Pt is a 66 yo M who was admitted to [facility] 6/20/23-6/24/23 for stroke like symptoms with finding of L ICA stenosis. Left carotid endarterectomy with patch angioplasty and intraoperative EEG monitoring performed on 6/23. Pt shares that since having surgery, he has beening having a lot of pain along L incision site and ear when trying to fall asleep. He has also been having severe headaches- sometimes an 8/10 lasting all day. Is able to go for walks, walking 7,000 steps in the morning, and 20,000 steps in the afternoon. Shares that he used to love running but is unable to do so now due to the pain along the L incision site. Shares that he is having pain with lifting objects- example given was when picking up  A gallon of milk- noted soreness in L shoulder followed by pulsating in L incision site, with tingling in L fingers, pain in L forearm. Also notes numbness along the L side of his face that has not decreased yet.   Has full custody of his 5 year old son who has autism but his son has been living with his mom (pt's ex-wife)- feels son is happier and receives better care with him. Waiting for clearance from providers before son is able to come back home.   Orthostasis noted in chart with \"Reports intermittent bouts of positional dizziness at home. No episodes of true syncope.\" when pt asked about it, he shares that he has dots in his vision at times that look like \"cats claws\" lasts about 10 minutes, needing to sit. Also has ringing in his ears, L (3-5 years) longer than R (no more than a year)      Presenting condition or subjective complaint:    Date of onset: 06/24/23    Relevant medical history:   HTN, HLD, pt reports recent cancer in nose- removed earlier this year, cognitive changes noted in chart, pt reports C-spine fusion   Dates & types of surgery:      Prior " diagnostic imaging/testing results:       Prior therapy history for the same diagnosis, illness or injury:        Prior Level of Function: Had normal ROM in L UE prior along with ability to run without pain.     Living Environment  Social support:  Lives with family members   Type of home:   2 story house   Stairs to enter the home:       9 with railing   Ramp:   No   Stairs inside the home:       11+10 with railing   Help at home:  None   Equipment owned:   None     Employment:    No   Hobbies/Interests:  Shares that he likes spending time with his son, staying close to his 3 grown daughters and going fishing     Patient goals for therapy:   improve mobility     Pain assessment:  No pain right now, L incision pain worst with going to bed, finding posiiton, it hits ear real bad   8/10 HA on Saturday- staying in bed all day due to pain      Objective   Cognitive Status Examination  Level of Consciousness: Alert  Follows Commands and Answers Questions: 100% of the time, Follows multi step instructions  Personal Safety and Judgement: Intact  Memory: Intact    POSTURE: WFL   RANGE OF MOTION: LE ROM WFL  STRENGTH: LE Strength WFL    TRANSFERS: Independent    GAIT:   Gait Description: WFL    BALANCE: WFL    SPECIAL TESTS  5 Times Sit-to-Stand (5TSTS)  11s     SHOULDER ROM:       L  R  Shoulder flexion                127 deg              WFL   Shoulder abduction           131 deg              WFL  Shoulder IR       Level of T10  Shoulder ER        Level of C1     SENSATION: Reports decreased sensation in L side of face     REFLEXES: NT, will assess further as needed   COORDINATION: NT, will assess further as needed   MUSCLE TONE: NT, will assess further as needed     Assessment & Plan   CLINICAL IMPRESSIONS   Medical Diagnosis: Benign essential hypertension (I10)    Treatment Diagnosis: Force production deficit   Impression/Assessment: Patient is a 67 year old male with reports of decreased L shoulder ROM, inability to run  without pain.  The following significant findings have been identified: Pain, Decreased ROM/flexibility, Decreased joint mobility, Decreased strength and Decreased activity tolerance. These impairments interfere with their ability to perform self care tasks, recreational activities and household chores as compared to previous level of function.     Clinical Decision Making (Complexity):   Clinical Presentation: Evolving/Changing  Clinical Presentation Rationale: based on medical and personal factors listed in PT evaluation  Clinical Decision Making (Complexity): Low complexity    PLAN OF CARE  Treatment Interventions:  Interventions: Gait Training, Manual Therapy, Neuromuscular Re-education, Therapeutic Activity, Therapeutic Exercise, Standardized Testing    Long Term Goals     PT Goal 1  Goal Identifier: HEP  Goal Description: Pt will be able to demonstrate HEP activities without need for cues from provider to demonstrate understanding and independence with HEP.  Rationale: to maximize safety and independence with performance of ADLs and functional tasks  Target Date: 08/28/23  PT Goal 2  Goal Identifier: ROM  Goal Description: Pt will demonstrate improve shoulder flexion ROM >150 to grab objects out of high cabinet safely.  Rationale: to maximize safety and independence with performance of ADLs and functional tasks  Target Date: 08/28/23  PT Goal 3  Goal Identifier: Pain  Goal Description: Pt will report 0/10 pain with carrying gallon of milk home in L hand to demonstrate improved strength and functional use of L hand  Rationale: to maximize safety and independence with performance of ADLs and functional tasks  Target Date: 08/28/23    Frequency of Treatment: 1x/week  Duration of Treatment: 8 weeks    Recommended Referrals to Other Professionals: Dietitian due to recent unplanned weightloss, lack of nutrition while healing   Education Assessment:   Learner/Method:  Patient;Demonstration;Pictures/Video;Listening  Risks and benefits of evaluation/treatment have been explained.   Patient/Family/caregiver agrees with Plan of Care.     Evaluation Time:     PT Eval, Low Complexity Minutes (48412): 35  Signing Clinician: Milagro Tabor, PT, DPT    [facility]                                                                                   OUTPATIENT PHYSICAL THERAPY      PLAN OF TREATMENT FOR OUTPATIENT REHABILITATION   Patient's Last Name, First Name, Rich Schaeffer YOB: 1955   Provider's Name   [facility]   Medical Record No.  [id number]     Onset Date: 06/24/23  Start of Care Date: 07/10/23     Medical Diagnosis:  Benign essential hypertension (I10)    PT Treatment Diagnosis:  Force production deficit Plan of Treatment  Frequency/Duration: 1x/week/ 8 weeks    Certification date from 07/10/23 to 08/28/23         See note for plan of treatment details and functional goals     Milagro Tabor, PT, DPT                        I CERTIFY THE NEED FOR THESE SERVICES FURNISHED UNDER        THIS PLAN OF TREATMENT AND WHILE UNDER MY CARE     (Physician attestation of this document indicates review and certification of the therapy plan).              Referring Provider: Izabel Hardy    Initial Assessment See Epic Evaluation- Start of Care Date: 07/10/23

## 2023-07-12 ENCOUNTER — OFFICE VISIT (OUTPATIENT)
Dept: OTHER | Facility: CLINIC | Age: 68
End: 2023-07-12
Payer: COMMERCIAL

## 2023-07-12 VITALS — DIASTOLIC BLOOD PRESSURE: 84 MMHG | HEART RATE: 87 BPM | SYSTOLIC BLOOD PRESSURE: 126 MMHG

## 2023-07-12 DIAGNOSIS — I65.22 LEFT CAROTID STENOSIS: Primary | ICD-10-CM

## 2023-07-12 DIAGNOSIS — E78.5 HYPERLIPIDEMIA LDL GOAL <70: ICD-10-CM

## 2023-07-12 PROCEDURE — G0463 HOSPITAL OUTPT CLINIC VISIT: HCPCS

## 2023-07-12 PROCEDURE — 99024 POSTOP FOLLOW-UP VISIT: CPT

## 2023-07-12 NOTE — PROGRESS NOTES
VASCULAR SURGERY PROGRESS NOTE    [facility]    Rich Moffett  Medical Record #:  [id number]  YOB: 1955  Age:  67 year old     Date of Service: 7/12/2023    PRIMARY CARE PROVIDER: Timbo Franco    Reason for visit:  Post-op    IMPRESSION:  Rich Moffett is a 67 year old male who underwent a left carotid endarterectomy on 6/23/2023 with Dr. Macdonald for symptomatic carotid stenosis. On exam today he is alert and oriented x4, continues to have numbness on left side of neck and face, does not impair swallowing or speech, and is improving. Incision is well healed. Denies amaurosis fugax, TIA or stroke symptoms.     RECOMMENDATION/RISKS: He should follow-up in 4 weeks with appointment with Dr. Macdonald and left carotid duplex. Continue 81 mg aspirin indefinitely, Plavix 75 mg for 1 month, and then he should have a fasting lipid panel in 3 months, last . Started on 80 mg Atorvastatin in hospital. Given recent events and known vascular disease, would recommend LDL goal of less than 70. Can have telephone appointment with Leticia Davis or Lina Card for follow-up on lipid panel. Rich is in agreement of plan      HPI:  Rich Moffett is a 67 year old male with past medical history significant for recent TIA with slurred speech, noted to have left sided carotid disease, hx of hypertension, hyperlipidemia.     REVIEW OF SYSTEMS:    A 12 point ROS was reviewed and is negative except for what is listed above in HPI.    PHH:  No past medical history on file.       Past Surgical History:   Procedure Laterality Date     ENDARTERECTOMY CAROTID Left 6/23/2023    Procedure: LEFT CAROTID ENDARTERECTOMY WITH ELECTROENCEPHALOGRAM AND INTRAOPERATIVE ULTRASOUND;  Surgeon: Kimberli Macdonald MD;  Location:  OR       ALLERGIES:  Patient has no known allergies.    MEDS:    Current Outpatient Medications:      acetaminophen (TYLENOL) 325 MG tablet, Take 2 tablets (650 mg) by  mouth every 4 hours as needed for mild pain, Disp: , Rfl:      aspirin 81 MG EC tablet, Take 1 tablet (81 mg) by mouth daily, Disp: 120 tablet, Rfl: 3     atorvastatin (LIPITOR) 80 MG tablet, Take 1 tablet (80 mg) by mouth every evening, Disp: 30 tablet, Rfl: 3     clopidogrel (PLAVIX) 75 MG tablet, Take 1 tablet (75 mg) by mouth daily, Disp: 30 tablet, Rfl: 0     diclofenac (VOLTAREN) 1 % topical gel, Apply 2 g topically 4 times daily as needed for moderate pain, Disp: , Rfl:      lisinopril-hydrochlorothiazide (ZESTORETIC) 20-12.5 MG tablet, Take 1 tablet by mouth daily, Disp: , Rfl:     SOCIAL HABITS:    History   Smoking Status     Former     Types: Cigarettes   Smokeless Tobacco     Never     Social History    Substance and Sexual Activity      Alcohol use: Never        Comment: rarely      History   Drug Use Unknown       FAMILY HISTORY:  No family history on file.    PE:  /84 (BP Location: Right arm, Patient Position: Chair, Cuff Size: Adult Regular)   Pulse 87   Wt Readings from Last 1 Encounters:   23 155 lb (70.3 kg)     There is no height or weight on file to calculate BMI.    EXAM:  GENERAL: well-developed 67 year old male who appears his stated age  CARDIAC: normal   CHEST/LUNG: normal respiratory effort   MUSCULOSKELETAL: grossly normal and both lower extremities are intact, no lower extremity edema  NEUROLOGIC: focally intact, alert and oriented x 3  PSYCH: appropriate affect  VASCULAR:       DIAGNOSTIC STUDIES:     Images:  US Intraoperative    Result Date: 2023  This exam was marked as non-reportable because it will not be read by a radiologist or a [place] non-radiologist provider.     Echocardiogram Complete - For age > 60 yrs    Result Date: 2023  [id number]^MICKEY^NISHA^JASON  [facility] Echocardiography Laboratory [address] [place] [address]  Name: HELEN CAMPBELL MRN: [id number] : 1955 Study Date:  06/20/2023 03:09 PM Age: 67 yrs Gender: Male Patient Location: [facility] Reason For Study: Cerebrovascular Incident Ordering Physician: NISHA AREVALO Referring Physician: JP MCKEON Performed By: Odell Hager  BSA: 1.8 m2 Height: 66 in Weight: 155 lb HR: 72 BP: 121/88 mmHg ______________________________________________________________________________ Procedure Complete Portable Echo Adult. ______________________________________________________________________________ Interpretation Summary  Left ventricular systolic function is normal. The visual ejection fraction is 60-65%. There is mild concentric left ventricular hypertrophy. The right ventricle is normal in structure, function and size. No significant valve dysfunction. The inferior vena cava was normal in size with preserved respiratory variability. There is no pericardial effusion.  No prior study for comparison. ______________________________________________________________________________ Left Ventricle The left ventricle is normal in size. There is mild concentric left ventricular hypertrophy. Left ventricular systolic function is normal. The visual ejection fraction is 60-65%. Normal left ventricular wall motion.  Right Ventricle The right ventricle is normal in structure, function and size.  Atria Normal left atrial size. Right atrial size is normal.  Mitral Valve There is mild mitral annular calcification.  Tricuspid Valve The tricuspid valve is normal in structure and function. The right ventricular systolic pressure is approximated at 35mmHg plus the right atrial pressure.  Aortic Valve There is mild trileaflet aortic sclerosis.  Pulmonic Valve The pulmonic valve is normal in structure and function.  Vessels The aortic root is normal size. Normal size ascending aorta. The inferior vena cava was normal in size with preserved respiratory variability.  Pericardium There is no pericardial effusion.   ______________________________________________________________________________ MMode/2D Measurements & Calculations IVSd: 1.1 cm LVIDd: 3.8 cm LVIDs: 2.2 cm LVPWd: 1.1 cm FS: 42.1 % LV mass(C)d: 134.7 grams LV mass(C)dI: 75.0 grams/m2  Ao root diam: 3.3 cm LA dimension: 3.5 cm asc Aorta Diam: 3.4 cm LA/Ao: 1.1 LVOT diam: 2.0 cm LVOT area: 3.1 cm2 LA Volume (BP): 36.3 ml LA Volume Index (BP): 20.3 ml/m2 RWT: 0.58 TAPSE: 4.0 cm  Doppler Measurements & Calculations MV E max salvador: 99.0 cm/sec MV A max salvador: 71.8 cm/sec MV E/A: 1.4  MV dec slope: 651.0 cm/sec2 MV dec time: 0.15 sec PA acc time: 0.12 sec TR max salvador: 272.3 cm/sec TR max P.0 mmHg E/E' av.0 Lateral E/e': 7.2 Medial E/e': 8.8 RV S Salvador: 16.5 cm/sec  ______________________________________________________________________________ Report approved by: Fran Sin 2023 04:07 PM       MR Brain w/o & w Contrast    Result Date: 2023  EXAM: MR BRAIN W/O and W CONTRAST LOCATION: [facility] DATE: 2023 INDICATION: cva slurred speech COMPARISON: Head and neck CTA 2023. CONTRAST: 7ml Gadavist TECHNIQUE: Routine multiplanar multisequence head MRI without and with intravenous contrast. FINDINGS: INTRACRANIAL CONTENTS: No acute or subacute infarct. No mass, acute hemorrhage, or extra-axial fluid collections. Few scattered nonspecific T2/FLAIR hyperintensities within the cerebral white matter most consistent with minimal chronic microvascular ischemic change. Normal ventricles and sulci. Normal position of the cerebellar tonsils. No pathologic contrast enhancement. SELLA: No abnormality accounting for technique. OSSEOUS STRUCTURES/SOFT TISSUES: Normal marrow signal. The major intracranial vascular flow voids are maintained. ORBITS: No abnormality accounting for technique. SINUSES/MASTOIDS: No paranasal sinus mucosal disease. No middle ear or mastoid effusion.     IMPRESSION: 1.  No findings to explain patient's  symptoms. No acute intracranial pathology. 2.  Minimal chronic small vessel ischemic disease.    CTA Head Neck with Contrast    Result Date: 6/20/2023  EXAM: CTA HEAD NECK W CONTRAST, CT HEAD PERFUSION W CONTRAST LOCATION: [facility] DATE: 6/20/2023 INDICATION: Code Stroke to evaluate for potential thrombolysis and thrombectomy. PLEASE READ IMMEDIATELY. Slurred speech, wake up symptoms COMPARISON: None. TECHNIQUE: Head and neck CT angiogram with IV contrast. Noncontrast head CT followed by axial helical CT images of the head and neck vessels obtained during the arterial phase of intravenous contrast administration. Axial 2D reconstructed images and multiplanar 3D MIP reconstructed images of the head and neck vessels were performed by the technologist. Additional CT cerebral perfusion was performed utilizing a second contrast bolus. Perfusion data were post processed with generation of standard perfusion maps and estimation of ischemic/infarcted volumes utilizing standard threshold values. Dose reduction techniques were used. All stenosis measurements made according to NASCET criteria unless otherwise specified. CONTRAST: Isovue-370, 75 mL IV. FINDINGS: NONCONTRAST HEAD CT: INTRACRANIAL CONTENTS: No intracranial hemorrhage, extraaxial collection, or mass effect.  No CT evidence of acute infarct. Normal parenchymal attenuation. Mild generalized volume loss. No hydrocephalus. VISUALIZED ORBITS/SINUSES/MASTOIDS: No intraorbital abnormality. No paranasal sinus mucosal disease. No middle ear or mastoid effusion. BONES/SOFT TISSUES: No acute abnormality. HEAD CTA: ANTERIOR CIRCULATION: All of the major large-caliber proximal anterior circulation vessels are patent without definite aneurysm or AVM. Mild to moderate calcific atheromatous disease in both carotid siphons. Fetal-type origin of the right posterior cerebral artery. POSTERIOR CIRCULATION: Both intracranial vertebral arteries and the  basilar artery are patent. Both posterior cerebral arteries patent. No posterior circulation aneurysm or AVM identified. Relatively balanced vertebral arteries. DURAL VENOUS SINUSES: Expected early enhancement of the major dural venous sinuses. NECK CTA: RIGHT CAROTID: No measurable stenosis or dissection. LEFT CAROTID: Somewhat irregular atheromatous disease in the proximal left ICA with a focal stenosis at the origin of the left ICA in the 60% range. At the distal end of the left carotid bulb is a more severe stenosis in the 85% range. Flow is preserved distally. No dissection. VERTEBRAL ARTERIES: No focal stenosis or dissection. Balanced vertebral arteries. AORTIC ARCH: Classic aortic arch anatomy with no significant stenosis at the origin of the great vessels. NONVASCULAR STRUCTURES: Unremarkable. CT PERFUSION: PERFUSION MAPS: Symmetrical cerebral perfusion. No focal deficits in cerebral blood flow or volume to suggest ischemia/oligemia. RAPID ANALYSIS: CBF<30%: 0. Tmax>6sec: 0. Mismatch volume: 0. Mismatch ratio: None.     IMPRESSION: HEAD CT: 1.  No acute intracranial abnormality. HEAD CTA: 1.  All of the major large-caliber proximal intracranial vessels are patent without definite aneurysm or AVM. 2.  Mild to moderate calcific atheromatous changes in both carotid siphons. NECK CTA: 1.  There are 2 somewhat irregular atheromatous stenoses in the proximal left ICA. The more proximal stenosis at the origin of the left ICA is in the 60% range. At the distal aspect of the left carotid bulb is a more severe stenosis in the 85% range. Flow is preserved distally in the left ICA without dissection changes. 2.  No significant stenosis or dissection in the right carotid system or in either vertebral artery in the neck. CT PERFUSION: 1.  Normal cerebral perfusion. Noncontrast head CT findings called to Dr. Fortune in the [facility] ER at 10:30 AM. CT angiogram and CT perfusion results called to Dr. Fortune at 10:48 AM on  06/20/2023.    CT Head Perfusion w Contrast - For Tier 2 Stroke    Result Date: 6/20/2023  EXAM: CTA HEAD NECK W CONTRAST, CT HEAD PERFUSION W CONTRAST LOCATION: [facility] DATE: 6/20/2023 INDICATION: Code Stroke to evaluate for potential thrombolysis and thrombectomy. PLEASE READ IMMEDIATELY. Slurred speech, wake up symptoms COMPARISON: None. TECHNIQUE: Head and neck CT angiogram with IV contrast. Noncontrast head CT followed by axial helical CT images of the head and neck vessels obtained during the arterial phase of intravenous contrast administration. Axial 2D reconstructed images and multiplanar 3D MIP reconstructed images of the head and neck vessels were performed by the technologist. Additional CT cerebral perfusion was performed utilizing a second contrast bolus. Perfusion data were post processed with generation of standard perfusion maps and estimation of ischemic/infarcted volumes utilizing standard threshold values. Dose reduction techniques were used. All stenosis measurements made according to NASCET criteria unless otherwise specified. CONTRAST: Isovue-370, 75 mL IV. FINDINGS: NONCONTRAST HEAD CT: INTRACRANIAL CONTENTS: No intracranial hemorrhage, extraaxial collection, or mass effect.  No CT evidence of acute infarct. Normal parenchymal attenuation. Mild generalized volume loss. No hydrocephalus. VISUALIZED ORBITS/SINUSES/MASTOIDS: No intraorbital abnormality. No paranasal sinus mucosal disease. No middle ear or mastoid effusion. BONES/SOFT TISSUES: No acute abnormality. HEAD CTA: ANTERIOR CIRCULATION: All of the major large-caliber proximal anterior circulation vessels are patent without definite aneurysm or AVM. Mild to moderate calcific atheromatous disease in both carotid siphons. Fetal-type origin of the right posterior cerebral artery. POSTERIOR CIRCULATION: Both intracranial vertebral arteries and the basilar artery are patent. Both posterior cerebral arteries patent.  No posterior circulation aneurysm or AVM identified. Relatively balanced vertebral arteries. DURAL VENOUS SINUSES: Expected early enhancement of the major dural venous sinuses. NECK CTA: RIGHT CAROTID: No measurable stenosis or dissection. LEFT CAROTID: Somewhat irregular atheromatous disease in the proximal left ICA with a focal stenosis at the origin of the left ICA in the 60% range. At the distal end of the left carotid bulb is a more severe stenosis in the 85% range. Flow is preserved distally. No dissection. VERTEBRAL ARTERIES: No focal stenosis or dissection. Balanced vertebral arteries. AORTIC ARCH: Classic aortic arch anatomy with no significant stenosis at the origin of the great vessels. NONVASCULAR STRUCTURES: Unremarkable. CT PERFUSION: PERFUSION MAPS: Symmetrical cerebral perfusion. No focal deficits in cerebral blood flow or volume to suggest ischemia/oligemia. RAPID ANALYSIS: CBF<30%: 0. Tmax>6sec: 0. Mismatch volume: 0. Mismatch ratio: None.     IMPRESSION: HEAD CT: 1.  No acute intracranial abnormality. HEAD CTA: 1.  All of the major large-caliber proximal intracranial vessels are patent without definite aneurysm or AVM. 2.  Mild to moderate calcific atheromatous changes in both carotid siphons. NECK CTA: 1.  There are 2 somewhat irregular atheromatous stenoses in the proximal left ICA. The more proximal stenosis at the origin of the left ICA is in the 60% range. At the distal aspect of the left carotid bulb is a more severe stenosis in the 85% range. Flow is preserved distally in the left ICA without dissection changes. 2.  No significant stenosis or dissection in the right carotid system or in either vertebral artery in the neck. CT PERFUSION: 1.  Normal cerebral perfusion. Noncontrast head CT findings called to Dr. Fortune in the [facility] at 10:30 AM. CT angiogram and CT perfusion results called to Dr. Fortune at 10:48 AM on 06/20/2023.    LABS:      Sodium   Date Value Ref Range Status    06/23/2023 141 136 - 145 mmol/L Final   06/21/2023 136 136 - 145 mmol/L Final   06/20/2023 137 136 - 145 mmol/L Final     Urea Nitrogen   Date Value Ref Range Status   06/23/2023 18.8 8.0 - 23.0 mg/dL Final   06/21/2023 17.6 8.0 - 23.0 mg/dL Final   06/20/2023 19.6 8.0 - 23.0 mg/dL Final   06/07/2022 19 8 - 22 mg/dL Final     Hemoglobin   Date Value Ref Range Status   06/23/2023 17.0 13.3 - 17.7 g/dL Final   06/21/2023 14.2 13.3 - 17.7 g/dL Final   06/20/2023 14.7 13.3 - 17.7 g/dL Final     Platelet Count   Date Value Ref Range Status   06/23/2023 238 150 - 450 10e3/uL Final   06/21/2023 192 150 - 450 10e3/uL Final   06/20/2023 209 150 - 450 10e3/uL Final     INR   Date Value Ref Range Status   06/20/2023 1.01 0.85 - 1.15 Final       30 minutes spent on the day of encounter doing chart review, history and exam, documentation, and further activities as noted.     Leticia Davis, NP  VASCULAR SURGERY

## 2023-07-12 NOTE — PROGRESS NOTES
[facility]        Patient is here for a  follow up.      Pt is currently taking Aspirin, Statin and Plavix.    /84 (BP Location: Right arm, Patient Position: Chair, Cuff Size: Adult Regular)   Pulse 87     The provider has been notified that the patient has no concerns.     Questions patient would like addressed today are: N/A.    Refills are needed: N/A    Has homecare services and agency name:  Kelly Lucia MA

## 2023-07-28 ENCOUNTER — LAB REQUISITION (OUTPATIENT)
Dept: LAB | Facility: CLINIC | Age: 68
End: 2023-07-28

## 2023-07-28 DIAGNOSIS — E78.5 HYPERLIPIDEMIA, UNSPECIFIED: ICD-10-CM

## 2023-07-28 DIAGNOSIS — Z12.5 ENCOUNTER FOR SCREENING FOR MALIGNANT NEOPLASM OF PROSTATE: ICD-10-CM

## 2023-07-28 LAB
CHOLEST SERPL-MCNC: 151 MG/DL
HDLC SERPL-MCNC: 38 MG/DL
LDLC SERPL CALC-MCNC: 85 MG/DL
NONHDLC SERPL-MCNC: 113 MG/DL
PSA SERPL DL<=0.01 NG/ML-MCNC: 0.77 NG/ML (ref 0–4.5)
TRIGL SERPL-MCNC: 139 MG/DL

## 2023-07-28 PROCEDURE — G0103 PSA SCREENING: HCPCS | Performed by: FAMILY MEDICINE

## 2023-07-28 PROCEDURE — 80061 LIPID PANEL: CPT | Performed by: FAMILY MEDICINE

## 2023-07-31 ENCOUNTER — THERAPY VISIT (OUTPATIENT)
Dept: PHYSICAL THERAPY | Facility: REHABILITATION | Age: 68
End: 2023-07-31
Attending: HOSPITALIST
Payer: COMMERCIAL

## 2023-07-31 DIAGNOSIS — M25.60 DECREASED RANGE OF MOTION: ICD-10-CM

## 2023-07-31 DIAGNOSIS — M25.60 DECREASED RANGE OF MOTION WITH DECREASED STRENGTH: ICD-10-CM

## 2023-07-31 DIAGNOSIS — R53.1 DECREASED RANGE OF MOTION WITH DECREASED STRENGTH: ICD-10-CM

## 2023-07-31 DIAGNOSIS — I10 BENIGN ESSENTIAL HYPERTENSION: Primary | ICD-10-CM

## 2023-07-31 PROCEDURE — 97750 PHYSICAL PERFORMANCE TEST: CPT | Mod: GP | Performed by: PHYSICAL THERAPIST

## 2023-07-31 NOTE — PROGRESS NOTES
DISCHARGE  Reason for Discharge: Patient has met all goals.    Equipment Issued: none    Discharge Plan: Other services: Check in with PCP about BP.    Referring Provider:  Izabel Hardy    07/31/23 0500   Appointment Info   Signing clinician's name / credentials Milagro Tabor, PT, DPT   Total/Authorized Visits 12   Visits Used 2   Medical Diagnosis Benign essential hypertension (I10)   PT Tx Diagnosis Force production deficit   Quick Adds Certification   Progress Note/Certification   Start of Care Date 07/10/23   Onset of illness/injury or Date of Surgery 06/24/23   Therapy Frequency 1x/week   Predicted Duration 8 weeks   Certification date from 07/10/23   Certification date to 08/28/23   Progress Note Due Date 08/28/23   GOALS   PT Goals 2;3   PT Goal 1   Goal Identifier HEP   Goal Description Pt will be able to demonstrate HEP activities without need for cues from provider to demonstrate understanding and independence with HEP.   Rationale to maximize safety and independence with performance of ADLs and functional tasks   Target Date 08/28/23   PT Goal 2   Goal Identifier ROM   Goal Description Pt will demonstrate improve shoulder flexion ROM >150 to grab objects out of high cabinet safely.   Rationale to maximize safety and independence with performance of ADLs and functional tasks   Goal Progress MET   Target Date 08/28/23   Date Met 07/31/23   PT Goal 3   Goal Identifier Pain   Goal Description Pt will report 0/10 pain with carrying gallon of milk home in L hand to demonstrate improved strength and functional use of L hand   Rationale to maximize safety and independence with performance of ADLs and functional tasks   Goal Progress MET   Target Date 08/28/23   Date Met 07/31/23   Subjective Report   Subjective Report Pt denies pain.  Has full ROM in L shoulder with HEP yet.   Vitals Signs   Blood Pressure 157/97  (had not taken BP medication - yet)   Treatment Interventions (PT)   Interventions  Therapeutic Procedure/Exercise   Therapeutic Procedure/Exercise   Ther Proc 1 Review of HEP   Ther Proc 1 - Details Pt educated on the following exercise to perform at home. Demonstrated/verbalized understanding and given print out to take home.   PTRx Ther Proc 1 Passive Shoulder Flexion Sitting   PTRx Ther Proc 1 - Details Demonstration and handout provided   PTRx Ther Proc 2 Passive Shoulder Abduction   PTRx Ther Proc 2 - Details Demonstration and handout provided   Skilled Intervention Edu on HEP, demonstration   Eval/Assessments   Assessments Physical Performance Test/Measures   Physical Performance Test/measures   Physical Performance Test/Measurement, Minutes (42762) 19   Physical Performance Test/Measurement Details MMT UE WFL 5/5 flexion, abd, IR, ER, elbow flexion/ext on left.  AROM : WFL fleixon/abd 180 degrees.   Skilled Intervention MMT and ROM testing along with BP - see above - BP high today - does take BP at home on a regular basis - takes BP medication - normally at 9am forgot to take this morning   Education   Learner/Method Patient;Demonstration;Pictures/Video;Listening   Plan   Home program PTRx   Plan for next session D/C today   Comments   Comments Denies pain, meet goals, full ROM and strength.  Continues to have numbness in face but denies tingling in arm, headaches or pain.   Total Session Time   Timed Code Treatment Minutes 19   Total Treatment Time (sum of timed and untimed services) 19

## 2023-08-09 ENCOUNTER — HOSPITAL ENCOUNTER (OUTPATIENT)
Dept: ULTRASOUND IMAGING | Facility: CLINIC | Age: 68
Discharge: HOME | End: 2023-08-09
Payer: COMMERCIAL

## 2023-08-09 ENCOUNTER — OFFICE VISIT (OUTPATIENT)
Dept: OTHER | Facility: CLINIC | Age: 68
End: 2023-08-09
Payer: COMMERCIAL

## 2023-08-09 VITALS
WEIGHT: 160 LBS | DIASTOLIC BLOOD PRESSURE: 80 MMHG | BODY MASS INDEX: 25.82 KG/M2 | HEART RATE: 65 BPM | SYSTOLIC BLOOD PRESSURE: 132 MMHG

## 2023-08-09 DIAGNOSIS — I65.22 LEFT CAROTID STENOSIS: ICD-10-CM

## 2023-08-09 DIAGNOSIS — I65.22 LEFT CAROTID STENOSIS: Primary | ICD-10-CM

## 2023-08-09 PROCEDURE — G0463 HOSPITAL OUTPT CLINIC VISIT: HCPCS | Mod: 25

## 2023-08-09 PROCEDURE — 93882 EXTRACRANIAL UNI/LTD STUDY: CPT | Mod: LT

## 2023-08-09 PROCEDURE — 93882 EXTRACRANIAL UNI/LTD STUDY: CPT | Mod: 26 | Performed by: SURGERY

## 2023-08-09 PROCEDURE — 99024 POSTOP FOLLOW-UP VISIT: CPT | Performed by: SURGERY

## 2023-08-09 NOTE — PROGRESS NOTES
[facility]        Patient is here for a  follow up.      Pt is currently taking Aspirin and Plavix.    /80 (BP Location: Left arm, Patient Position: Chair, Cuff Size: Adult Regular)   Pulse 65   Wt 160 lb (72.6 kg)   BMI 25.82 kg/m      The provider has been notified that the patient has no concerns.     Questions patient would like addressed today are: N/A.    Refills are needed: N/A    Has homecare services and agency name:  Kelly Lucia MA

## 2023-08-09 NOTE — PROGRESS NOTES
"Vascular Surgery Progress Note     Date: August 9, 2023     Reason for Visit:  Post-op    Subjective:  Mr. Moffett reports doing very well. He says that he is feeling stronger and stronger. He has numbness along the left lateral neck into the earlobe around his incision. He is still profoundly grateful to his son, who he feels \"saved his life\" by recognizing he was unwell and handing him the phone to call for help.       Current Outpatient Medications:     acetaminophen (TYLENOL) 325 MG tablet, Take 2 tablets (650 mg) by mouth every 4 hours as needed for mild pain, Disp: , Rfl:     aspirin 81 MG EC tablet, Take 1 tablet (81 mg) by mouth daily, Disp: 120 tablet, Rfl: 3    atorvastatin (LIPITOR) 80 MG tablet, Take 1 tablet (80 mg) by mouth every evening, Disp: 30 tablet, Rfl: 3    clopidogrel (PLAVIX) 75 MG tablet, Take 1 tablet (75 mg) by mouth daily, Disp: 30 tablet, Rfl: 0    diclofenac (VOLTAREN) 1 % topical gel, Apply 2 g topically 4 times daily as needed for moderate pain, Disp: , Rfl:     lisinopril-hydrochlorothiazide (ZESTORETIC) 20-12.5 MG tablet, Take 1 tablet by mouth daily, Disp: , Rfl:      Physical Exam       BP: 132/80 Pulse: 65            Vital Signs with Ranges  Pulse:  [65] 65  BP: (132)/(80) 132/80  160 lbs 0 oz    Constitutional: cooperative, no apparent distress, sitting comfortably in chair.   HENT: EOMI and conjugate. Moist mucous membranes. Facial sensation and muscle movement symmetric BL. Hearing intact to normal speaking tone. No hoarseness of voice. Shoulder shrug symmetric. Tongue protrudes midline. At least partially edentulous. Well-healed incision along left neck.   Vascular: bilateral DP pulses palpable  Musculoskeletal: grossly normal and symmetric ROM and strength in BL extremities   Neurologic: Awake, alert, oriented to name, place, time, and situation    Imaging:  I have reviewed the following imaging studies:  US Carotids (8/9/23): \"Left side: Widely patent left carotid " "endarterectomy site, with less than 50% stenosis predicted in the left internal carotid artery.\"       Assessment & Plan   Mr. Moffett is a 67M with history of HTN, DL, GERD, who presented with concerns for TIA based on dysarthria in the setting of baseline neurocognitive disorder. He was evaluated by Neurology, who felt that this was most likely not symptomatic from his carotid stenosis; due to social constraints with returning for future care, he underwent same-admission left carotid endarterectomy on 6/23/23 for his severe left carotid stenosis.      Follow up in about 2 months for repeat lipid panel, visit with Leticia Davis NP. Reminded him of this appointment.   He has been taking his medications; continue on aspirin 81 mg + atorvastatin 80 mg daily indefinitely for optimal medical management of PAD  Will see him in 6 months for repeat carotid ultrasound; reviewed the current imaging with him today.     Kimberli Macdonald MD        "

## 2023-09-11 ENCOUNTER — LAB REQUISITION (OUTPATIENT)
Dept: LAB | Facility: CLINIC | Age: 68
End: 2023-09-11

## 2023-09-11 DIAGNOSIS — I10 ESSENTIAL (PRIMARY) HYPERTENSION: ICD-10-CM

## 2023-09-11 LAB
ANION GAP SERPL CALCULATED.3IONS-SCNC: 17 MMOL/L (ref 7–15)
BUN SERPL-MCNC: 29.7 MG/DL (ref 8–23)
CALCIUM SERPL-MCNC: 9.1 MG/DL (ref 8.8–10.2)
CHLORIDE SERPL-SCNC: 102 MMOL/L (ref 98–107)
CREAT SERPL-MCNC: 1.27 MG/DL (ref 0.67–1.17)
DEPRECATED HCO3 PLAS-SCNC: 18 MMOL/L (ref 22–29)
EGFRCR SERPLBLD CKD-EPI 2021: 62 ML/MIN/1.73M2
GLUCOSE SERPL-MCNC: 93 MG/DL (ref 70–99)
POTASSIUM SERPL-SCNC: 4.1 MMOL/L (ref 3.4–5.3)
SODIUM SERPL-SCNC: 137 MMOL/L (ref 136–145)

## 2023-09-11 PROCEDURE — 80048 BASIC METABOLIC PNL TOTAL CA: CPT | Performed by: FAMILY MEDICINE

## 2023-10-12 ENCOUNTER — LAB (OUTPATIENT)
Dept: LAB | Facility: CLINIC | Age: 68
End: 2023-10-12
Payer: COMMERCIAL

## 2023-10-12 DIAGNOSIS — E78.5 HYPERLIPIDEMIA LDL GOAL <70: ICD-10-CM

## 2023-10-12 LAB
CHOLEST SERPL-MCNC: 151 MG/DL
HDLC SERPL-MCNC: 40 MG/DL
LDLC SERPL CALC-MCNC: 92 MG/DL
NONHDLC SERPL-MCNC: 111 MG/DL
TRIGL SERPL-MCNC: 95 MG/DL

## 2023-10-12 PROCEDURE — 80061 LIPID PANEL: CPT

## 2023-10-12 PROCEDURE — 36415 COLL VENOUS BLD VENIPUNCTURE: CPT

## 2023-10-30 ENCOUNTER — VIRTUAL VISIT (OUTPATIENT)
Dept: OTHER | Facility: CLINIC | Age: 68
End: 2023-10-30
Payer: COMMERCIAL

## 2023-10-30 DIAGNOSIS — E78.5 HYPERLIPIDEMIA LDL GOAL <70: Primary | ICD-10-CM

## 2023-10-30 PROCEDURE — 99441 PR PHYSICIAN TELEPHONE EVALUATION 5-10 MIN: CPT | Mod: 95

## 2023-10-30 RX ORDER — ROSUVASTATIN CALCIUM 40 MG/1
40 TABLET, COATED ORAL DAILY
Qty: 90 TABLET | Refills: 0 | Status: SHIPPED | OUTPATIENT
Start: 2023-10-30 | End: 2024-04-09

## 2023-10-30 NOTE — PROGRESS NOTES
Rich is a 67 year old who is being evaluated via a billable telephone visit.      What phone number would you like to be contacted at? [phone]  How would you like to obtain your AVS? MyChart    Distant Location (provider location):  On-site      Objective           Vitals:  No vitals were obtained today due to virtual visit.      FATEMEH guerin

## 2023-10-30 NOTE — PROGRESS NOTES
Telephone visit: 10 minutes    Rich Moffett is a 67 year old male who underwent a left carotid endarterectomy with Dr. Macdonald in June of 2023. Found to have elevated LDL to 131. Given cardiovascular comorbidities, LDL goal is under 70. He currently was on Atorvastatin 80 mg, tolerating well, however his most recent LDL is 92. He is compliant.     Given unable to meet goals, elected to switch Rich to Crestor 40 mg. We will recheck his lipid panel in 3 months with a telephone visit. Rich had no further questions.     Leticia Davis, CNP

## 2023-11-21 DIAGNOSIS — E78.5 HYPERLIPIDEMIA LDL GOAL <70: Primary | ICD-10-CM

## 2023-11-21 DIAGNOSIS — I65.22 LEFT CAROTID STENOSIS: ICD-10-CM

## 2024-01-25 ENCOUNTER — LAB (OUTPATIENT)
Dept: LAB | Facility: CLINIC | Age: 69
End: 2024-01-25
Payer: COMMERCIAL

## 2024-01-25 DIAGNOSIS — E78.5 HYPERLIPIDEMIA LDL GOAL <70: ICD-10-CM

## 2024-01-25 LAB
CHOLEST SERPL-MCNC: 223 MG/DL
FASTING STATUS PATIENT QL REPORTED: YES
HDLC SERPL-MCNC: 43 MG/DL
LDLC SERPL CALC-MCNC: 121 MG/DL
NONHDLC SERPL-MCNC: 180 MG/DL
TRIGL SERPL-MCNC: 297 MG/DL

## 2024-01-25 PROCEDURE — 80061 LIPID PANEL: CPT

## 2024-01-25 PROCEDURE — 36415 COLL VENOUS BLD VENIPUNCTURE: CPT

## 2024-02-09 ENCOUNTER — HOSPITAL ENCOUNTER (OUTPATIENT)
Dept: ULTRASOUND IMAGING | Facility: CLINIC | Age: 69
Discharge: HOME | End: 2024-02-09
Attending: SURGERY | Admitting: SURGERY
Payer: COMMERCIAL

## 2024-02-09 DIAGNOSIS — I65.22 LEFT CAROTID STENOSIS: ICD-10-CM

## 2024-02-09 PROCEDURE — 93880 EXTRACRANIAL BILAT STUDY: CPT | Mod: 26 | Performed by: SURGERY

## 2024-02-09 PROCEDURE — 93880 EXTRACRANIAL BILAT STUDY: CPT

## 2024-02-12 ENCOUNTER — VIRTUAL VISIT (OUTPATIENT)
Dept: OTHER | Facility: CLINIC | Age: 69
End: 2024-02-12
Payer: COMMERCIAL

## 2024-02-12 DIAGNOSIS — E78.5 HYPERLIPIDEMIA LDL GOAL <70: ICD-10-CM

## 2024-02-12 DIAGNOSIS — I65.22 LEFT CAROTID STENOSIS: ICD-10-CM

## 2024-02-12 PROCEDURE — 99024 POSTOP FOLLOW-UP VISIT: CPT | Mod: 93

## 2024-02-12 RX ORDER — LISINOPRIL 10 MG/1
10 TABLET ORAL EVERY EVENING
COMMUNITY
End: 2024-04-09

## 2024-02-12 RX ORDER — EZETIMIBE 10 MG/1
10 TABLET ORAL DAILY
Qty: 90 TABLET | Refills: 1 | Status: SHIPPED | OUTPATIENT
Start: 2024-02-12 | End: 2024-10-02

## 2024-02-12 NOTE — PROGRESS NOTES
[phone]  follow up to 10/25/23     Patient relayed new onset of Chest Pain.  Recommended emergency room - updated Leticia Davis NP.  Patient states he will call his primary after talking to Leticia.    Has homecare services and agency name: Kelly Guadarrama RN

## 2024-02-13 ENCOUNTER — APPOINTMENT (OUTPATIENT)
Dept: CT IMAGING | Facility: HOSPITAL | Age: 69
End: 2024-02-13
Attending: EMERGENCY MEDICINE
Payer: COMMERCIAL

## 2024-02-13 ENCOUNTER — HOSPITAL ENCOUNTER (OUTPATIENT)
Facility: HOSPITAL | Age: 69
Setting detail: OBSERVATION
Discharge: HOME | End: 2024-02-14
Attending: EMERGENCY MEDICINE | Admitting: EMERGENCY MEDICINE
Payer: COMMERCIAL

## 2024-02-13 DIAGNOSIS — I63.9 ACUTE CVA (CEREBROVASCULAR ACCIDENT) (H): Primary | ICD-10-CM

## 2024-02-13 DIAGNOSIS — R07.9 CHEST PAIN, UNSPECIFIED TYPE: ICD-10-CM

## 2024-02-13 DIAGNOSIS — N17.9 AKI (ACUTE KIDNEY INJURY) (H): ICD-10-CM

## 2024-02-13 DIAGNOSIS — I16.0 HYPERTENSIVE URGENCY: ICD-10-CM

## 2024-02-13 LAB
ALBUMIN SERPL BCG-MCNC: 4.4 G/DL (ref 3.5–5.2)
ALP SERPL-CCNC: 60 U/L (ref 40–150)
ALT SERPL W P-5'-P-CCNC: 36 U/L (ref 0–70)
ANION GAP SERPL CALCULATED.3IONS-SCNC: 7 MMOL/L (ref 7–15)
APTT PPP: 27 SECONDS (ref 22–38)
AST SERPL W P-5'-P-CCNC: 29 U/L (ref 0–45)
BASOPHILS # BLD AUTO: 0 10E3/UL (ref 0–0.2)
BASOPHILS NFR BLD AUTO: 0 %
BILIRUB SERPL-MCNC: 1 MG/DL
BUN SERPL-MCNC: 14.7 MG/DL (ref 8–23)
CALCIUM SERPL-MCNC: 9.6 MG/DL (ref 8.8–10.2)
CHLORIDE SERPL-SCNC: 100 MMOL/L (ref 98–107)
CREAT SERPL-MCNC: 1.28 MG/DL (ref 0.67–1.17)
DEPRECATED HCO3 PLAS-SCNC: 32 MMOL/L (ref 22–29)
EGFRCR SERPLBLD CKD-EPI 2021: 61 ML/MIN/1.73M2
EOSINOPHIL # BLD AUTO: 0.1 10E3/UL (ref 0–0.7)
EOSINOPHIL NFR BLD AUTO: 1 %
ERYTHROCYTE [DISTWIDTH] IN BLOOD BY AUTOMATED COUNT: 13.2 % (ref 10–15)
FLUAV RNA SPEC QL NAA+PROBE: NEGATIVE
FLUBV RNA RESP QL NAA+PROBE: NEGATIVE
GLUCOSE SERPL-MCNC: 108 MG/DL (ref 70–99)
HBA1C MFR BLD: 5.9 %
HCT VFR BLD AUTO: 46.9 % (ref 40–53)
HGB BLD-MCNC: 15.8 G/DL (ref 13.3–17.7)
HOLD SPECIMEN: NORMAL
IMM GRANULOCYTES # BLD: 0 10E3/UL
IMM GRANULOCYTES NFR BLD: 0 %
INR PPP: 0.99 (ref 0.85–1.15)
LYMPHOCYTES # BLD AUTO: 2 10E3/UL (ref 0.8–5.3)
LYMPHOCYTES NFR BLD AUTO: 33 %
MAGNESIUM SERPL-MCNC: 2.1 MG/DL (ref 1.7–2.3)
MCH RBC QN AUTO: 28.2 PG (ref 26.5–33)
MCHC RBC AUTO-ENTMCNC: 33.7 G/DL (ref 31.5–36.5)
MCV RBC AUTO: 84 FL (ref 78–100)
MONOCYTES # BLD AUTO: 0.6 10E3/UL (ref 0–1.3)
MONOCYTES NFR BLD AUTO: 10 %
NEUTROPHILS # BLD AUTO: 3.3 10E3/UL (ref 1.6–8.3)
NEUTROPHILS NFR BLD AUTO: 56 %
NRBC # BLD AUTO: 0 10E3/UL
NRBC BLD AUTO-RTO: 0 /100
NT-PROBNP SERPL-MCNC: 44 PG/ML (ref 0–900)
PLATELET # BLD AUTO: 186 10E3/UL (ref 150–450)
POTASSIUM SERPL-SCNC: 4.4 MMOL/L (ref 3.4–5.3)
PROT SERPL-MCNC: 7.6 G/DL (ref 6.4–8.3)
RBC # BLD AUTO: 5.61 10E6/UL (ref 4.4–5.9)
RSV RNA SPEC NAA+PROBE: NEGATIVE
SARS-COV-2 RNA RESP QL NAA+PROBE: NEGATIVE
SODIUM SERPL-SCNC: 139 MMOL/L (ref 135–145)
TROPONIN T SERPL HS-MCNC: 12 NG/L
TROPONIN T SERPL HS-MCNC: 7 NG/L
TROPONIN T SERPL HS-MCNC: 7 NG/L
WBC # BLD AUTO: 6 10E3/UL (ref 4–11)

## 2024-02-13 PROCEDURE — 258N000003 HC RX IP 258 OP 636: Performed by: INTERNAL MEDICINE

## 2024-02-13 PROCEDURE — 83036 HEMOGLOBIN GLYCOSYLATED A1C: CPT | Performed by: INTERNAL MEDICINE

## 2024-02-13 PROCEDURE — 93005 ELECTROCARDIOGRAM TRACING: CPT | Performed by: EMERGENCY MEDICINE

## 2024-02-13 PROCEDURE — 250N000013 HC RX MED GY IP 250 OP 250 PS 637: Performed by: EMERGENCY MEDICINE

## 2024-02-13 PROCEDURE — 84484 ASSAY OF TROPONIN QUANT: CPT | Performed by: EMERGENCY MEDICINE

## 2024-02-13 PROCEDURE — 85025 COMPLETE CBC W/AUTO DIFF WBC: CPT | Performed by: EMERGENCY MEDICINE

## 2024-02-13 PROCEDURE — 250N000011 HC RX IP 250 OP 636: Performed by: EMERGENCY MEDICINE

## 2024-02-13 PROCEDURE — 96361 HYDRATE IV INFUSION ADD-ON: CPT

## 2024-02-13 PROCEDURE — 99285 EMERGENCY DEPT VISIT HI MDM: CPT | Mod: 25

## 2024-02-13 PROCEDURE — 250N000013 HC RX MED GY IP 250 OP 250 PS 637: Performed by: INTERNAL MEDICINE

## 2024-02-13 PROCEDURE — 93005 ELECTROCARDIOGRAM TRACING: CPT | Performed by: INTERNAL MEDICINE

## 2024-02-13 PROCEDURE — 36415 COLL VENOUS BLD VENIPUNCTURE: CPT | Performed by: INTERNAL MEDICINE

## 2024-02-13 PROCEDURE — 71275 CT ANGIOGRAPHY CHEST: CPT

## 2024-02-13 PROCEDURE — G0378 HOSPITAL OBSERVATION PER HR: HCPCS

## 2024-02-13 PROCEDURE — 99223 1ST HOSP IP/OBS HIGH 75: CPT | Performed by: INTERNAL MEDICINE

## 2024-02-13 PROCEDURE — 80053 COMPREHEN METABOLIC PANEL: CPT | Performed by: EMERGENCY MEDICINE

## 2024-02-13 PROCEDURE — 85610 PROTHROMBIN TIME: CPT | Performed by: EMERGENCY MEDICINE

## 2024-02-13 PROCEDURE — 96374 THER/PROPH/DIAG INJ IV PUSH: CPT | Mod: 59

## 2024-02-13 PROCEDURE — 36415 COLL VENOUS BLD VENIPUNCTURE: CPT | Performed by: EMERGENCY MEDICINE

## 2024-02-13 PROCEDURE — 84484 ASSAY OF TROPONIN QUANT: CPT | Performed by: INTERNAL MEDICINE

## 2024-02-13 PROCEDURE — 83735 ASSAY OF MAGNESIUM: CPT | Performed by: EMERGENCY MEDICINE

## 2024-02-13 PROCEDURE — 85730 THROMBOPLASTIN TIME PARTIAL: CPT | Performed by: EMERGENCY MEDICINE

## 2024-02-13 PROCEDURE — 83880 ASSAY OF NATRIURETIC PEPTIDE: CPT | Performed by: EMERGENCY MEDICINE

## 2024-02-13 PROCEDURE — 87637 SARSCOV2&INF A&B&RSV AMP PRB: CPT | Performed by: EMERGENCY MEDICINE

## 2024-02-13 RX ORDER — AMLODIPINE BESYLATE 2.5 MG/1
2.5 TABLET ORAL DAILY
Status: DISCONTINUED | OUTPATIENT
Start: 2024-02-14 | End: 2024-02-14 | Stop reason: HOSPADM

## 2024-02-13 RX ORDER — HYDRALAZINE HYDROCHLORIDE 20 MG/ML
10 INJECTION INTRAMUSCULAR; INTRAVENOUS EVERY 6 HOURS PRN
Status: DISCONTINUED | OUTPATIENT
Start: 2024-02-13 | End: 2024-02-14 | Stop reason: HOSPADM

## 2024-02-13 RX ORDER — AMOXICILLIN 250 MG
2 CAPSULE ORAL 2 TIMES DAILY PRN
Status: DISCONTINUED | OUTPATIENT
Start: 2024-02-13 | End: 2024-02-14 | Stop reason: HOSPADM

## 2024-02-13 RX ORDER — ACETAMINOPHEN 325 MG/1
650 TABLET ORAL EVERY 4 HOURS PRN
Status: DISCONTINUED | OUTPATIENT
Start: 2024-02-13 | End: 2024-02-14

## 2024-02-13 RX ORDER — SODIUM CHLORIDE 9 MG/ML
INJECTION, SOLUTION INTRAVENOUS CONTINUOUS
Status: DISCONTINUED | OUTPATIENT
Start: 2024-02-13 | End: 2024-02-14

## 2024-02-13 RX ORDER — ONDANSETRON 4 MG/1
4 TABLET, ORALLY DISINTEGRATING ORAL EVERY 6 HOURS PRN
Status: DISCONTINUED | OUTPATIENT
Start: 2024-02-13 | End: 2024-02-14 | Stop reason: HOSPADM

## 2024-02-13 RX ORDER — ACETAMINOPHEN 650 MG/1
650 SUPPOSITORY RECTAL EVERY 4 HOURS PRN
Status: DISCONTINUED | OUTPATIENT
Start: 2024-02-13 | End: 2024-02-14 | Stop reason: HOSPADM

## 2024-02-13 RX ORDER — LISINOPRIL AND HYDROCHLOROTHIAZIDE 12.5; 2 MG/1; MG/1
1 TABLET ORAL EVERY MORNING
Status: DISCONTINUED | OUTPATIENT
Start: 2024-02-14 | End: 2024-02-14 | Stop reason: HOSPADM

## 2024-02-13 RX ORDER — ROSUVASTATIN CALCIUM 40 MG/1
40 TABLET, COATED ORAL DAILY
Status: DISCONTINUED | OUTPATIENT
Start: 2024-02-14 | End: 2024-02-14 | Stop reason: HOSPADM

## 2024-02-13 RX ORDER — HYDRALAZINE HYDROCHLORIDE 20 MG/ML
20 INJECTION INTRAMUSCULAR; INTRAVENOUS ONCE
Status: COMPLETED | OUTPATIENT
Start: 2024-02-13 | End: 2024-02-13

## 2024-02-13 RX ORDER — ACETAMINOPHEN 325 MG/1
650 TABLET ORAL ONCE
Status: COMPLETED | OUTPATIENT
Start: 2024-02-13 | End: 2024-02-13

## 2024-02-13 RX ORDER — PANTOPRAZOLE SODIUM 40 MG/1
40 TABLET, DELAYED RELEASE ORAL
Status: DISCONTINUED | OUTPATIENT
Start: 2024-02-14 | End: 2024-02-14 | Stop reason: HOSPADM

## 2024-02-13 RX ORDER — ACETAMINOPHEN 325 MG/1
650 TABLET ORAL EVERY 4 HOURS PRN
Status: DISCONTINUED | OUTPATIENT
Start: 2024-02-13 | End: 2024-02-14 | Stop reason: HOSPADM

## 2024-02-13 RX ORDER — AMLODIPINE BESYLATE 5 MG/1
5 TABLET ORAL DAILY
Status: DISCONTINUED | OUTPATIENT
Start: 2024-02-13 | End: 2024-02-13

## 2024-02-13 RX ORDER — EZETIMIBE 10 MG/1
10 TABLET ORAL DAILY
Status: DISCONTINUED | OUTPATIENT
Start: 2024-02-14 | End: 2024-02-14 | Stop reason: HOSPADM

## 2024-02-13 RX ORDER — IOPAMIDOL 755 MG/ML
90 INJECTION, SOLUTION INTRAVASCULAR ONCE
Status: COMPLETED | OUTPATIENT
Start: 2024-02-13 | End: 2024-02-13

## 2024-02-13 RX ORDER — ASPIRIN 81 MG/1
81 TABLET ORAL DAILY
Status: DISCONTINUED | OUTPATIENT
Start: 2024-02-13 | End: 2024-02-14 | Stop reason: HOSPADM

## 2024-02-13 RX ORDER — METOPROLOL TARTRATE 25 MG/1
25 TABLET, FILM COATED ORAL ONCE
Status: COMPLETED | OUTPATIENT
Start: 2024-02-13 | End: 2024-02-13

## 2024-02-13 RX ORDER — AMOXICILLIN 250 MG
1 CAPSULE ORAL 2 TIMES DAILY PRN
Status: DISCONTINUED | OUTPATIENT
Start: 2024-02-13 | End: 2024-02-14 | Stop reason: HOSPADM

## 2024-02-13 RX ORDER — ONDANSETRON 2 MG/ML
4 INJECTION INTRAMUSCULAR; INTRAVENOUS EVERY 6 HOURS PRN
Status: DISCONTINUED | OUTPATIENT
Start: 2024-02-13 | End: 2024-02-14 | Stop reason: HOSPADM

## 2024-02-13 RX ADMIN — ACETAMINOPHEN 650 MG: 325 TABLET ORAL at 19:00

## 2024-02-13 RX ADMIN — ACETAMINOPHEN 650 MG: 325 TABLET ORAL at 12:29

## 2024-02-13 RX ADMIN — SODIUM CHLORIDE: 9 INJECTION, SOLUTION INTRAVENOUS at 17:35

## 2024-02-13 RX ADMIN — METOPROLOL TARTRATE 25 MG: 25 TABLET, FILM COATED ORAL at 11:03

## 2024-02-13 RX ADMIN — HYDRALAZINE HYDROCHLORIDE 20 MG: 20 INJECTION INTRAMUSCULAR; INTRAVENOUS at 11:03

## 2024-02-13 RX ADMIN — Medication 81 MG: at 14:28

## 2024-02-13 RX ADMIN — AMLODIPINE BESYLATE 5 MG: 5 TABLET ORAL at 14:29

## 2024-02-13 RX ADMIN — SODIUM CHLORIDE 1000 ML: 9 INJECTION, SOLUTION INTRAVENOUS at 15:54

## 2024-02-13 RX ADMIN — IOPAMIDOL 90 ML: 755 INJECTION, SOLUTION INTRAVENOUS at 11:35

## 2024-02-13 ASSESSMENT — ACTIVITIES OF DAILY LIVING (ADL)
ADLS_ACUITY_SCORE: 34
DEPENDENT_IADLS:: INDEPENDENT
ADLS_ACUITY_SCORE: 34
ADLS_ACUITY_SCORE: 38
ADLS_ACUITY_SCORE: 38
ADLS_ACUITY_SCORE: 34
ADLS_ACUITY_SCORE: 38
ADLS_ACUITY_SCORE: 38

## 2024-02-13 NOTE — H&P
[facility]    History and Physical - Hospitalist Service       Date of Admission:  2/13/2024    Assessment & Plan      68 year old male with past medical history of HTN, HLD, GERD, and prior stroke who presents to the ED today with chest pain and found to have hypertensive urgency    Hypertensive urgency  - Significant elevation blood pressure admission to 216/107  - Patient admits compliance with his medications  - Blood pressure improving in the ER after receiving hydralazine and metoprolol  - Restart home lisinopril/hydrochlorothiazide  - Start Norvasc  - Continue to monitor blood pressure  - Check echo    Chest pain  - Probably secondary to hypertension urgency  - Resolved in the ER  - Patient has multiple risk factors  - Negative initial troponin and EKG for significant changes  -Continue to monitor telemetry with serial troponin  -Nuclear stress test  -Continue home aspirin    Hyperlipidemia  - Recent lipid panel shows elevation of LDL, TGs and total.  - Resume home Zetia and Crestor        Observation Goals: -diagnostic tests and consults completed and resulted, -vital signs normal or at patient baseline, -tolerating oral intake to maintain hydration, -adequate pain control on oral analgesics, -returns to baseline functional status, Stress test results.  , Nurse to notify provider when observation goals have been met and patient is ready for discharge.  Diet: Combination Diet 2 gm NA Diet; Low Saturated Fat Diet, No Caffeine Diet    DVT Prophylaxis: Pneumatic Compression Devices  Rodriguez Catheter: Not present  Lines: None     Cardiac Monitoring: ACTIVE order. Indication: Chest pain/ ACS rule out (24 hours)  Code Status: Full Code    Clinically Significant Risk Factors Present on Admission                # Drug Induced Platelet Defect: home medication list includes an antiplatelet medication   # Hypertension: Noted on problem list                 Disposition Plan      Expected Discharge  Date: 02/14/2024                  Jose Martin MD  Hospitalist Service  [facility]  Securely message with [employer] (more info)  Text page via [facility] Paging/Directory     ______________________________________________________________________    Chief Complaint   Chest pain    History is obtained from the patient    History of Present Illness   Rich Moffett is a 68 year old male with past medical history of HTN, HLD, GERD, and prior stroke who presents to the ED today via private vehicle with his daughter with chest pain. He notes he has had intermittent chest pain for the past 2 weeks, primarily located the right side of his chest. Since the pain began he has also experienced dizziness, headache, loss of appetite, and a cough that has self-resolved. He woke up around 6:00 AM this morning and felt the pain start then shoot from the right side of his chest to the left side as he was laying in bed. He rated the pain a 4/10 and noted that this pain lasted for about 1-1.5 minutes. He described the pain as a steady thumping pain. Denies any current pain. He admitted to having heart murmurs as a kid, but denies any other heart related issues. Also denies any back pain with the chest pain. He has taken his prescribed medications as directed, but has not taken any other medication to treat his pain. He denies any history of DVT or coronary stenting. He does not routinely follow with cardiology. His symptoms from this morning self-resolved.  In the ER patient was found to have significant elevated blood pressure with 216/107      Past Medical History    No past medical history on file.    Past Surgical History   Past Surgical History:   Procedure Laterality Date    ENDARTERECTOMY CAROTID Left 6/23/2023    Procedure: LEFT CAROTID ENDARTERECTOMY WITH ELECTROENCEPHALOGRAM AND INTRAOPERATIVE ULTRASOUND;  Surgeon: Kimberli Macdonald MD;  Location:  OR       Prior to Admission Medications    Prior to Admission Medications   Prescriptions Last Dose Informant Patient Reported? Taking?   acetaminophen (TYLENOL) 325 MG tablet   No Yes   Sig: Take 2 tablets (650 mg) by mouth every 4 hours as needed for mild pain   aspirin 81 MG EC tablet 2/12/2024 at PM  No Yes   Sig: Take 1 tablet (81 mg) by mouth daily   diclofenac (VOLTAREN) 1 % topical gel Past Week Self Yes Yes   Sig: Apply 2 g topically 4 times daily as needed for moderate pain   ezetimibe (ZETIA) 10 MG tablet  at hasn't picked up yet  No Yes   Sig: Take 1 tablet (10 mg) by mouth daily   lisinopril (ZESTRIL) 10 MG tablet 2/12/2024 at pm  Yes Yes   Sig: Take 10 mg by mouth every evening   lisinopril-hydrochlorothiazide (ZESTORETIC) 20-12.5 MG tablet 2/13/2024 at am Self Yes Yes   Sig: Take 1 tablet by mouth every morning   rosuvastatin (CRESTOR) 40 MG tablet 2/13/2024 at am  No Yes   Sig: Take 1 tablet (40 mg) by mouth daily      Facility-Administered Medications: None        Review of Systems    The 10 point Review of Systems is negative other than noted in the HPI or here.     Social History   I have reviewed this patient's social history and updated it with pertinent information if needed.  Social History     Tobacco Use    Smoking status: Former     Types: Cigarettes    Smokeless tobacco: Never   Substance Use Topics    Alcohol use: Yes     Comment: occ    Drug use: Never         Family History     Positive for coronary artery disease and hypertension.  Negative for diabetes      Allergies   No Known Allergies     Physical Exam   Vital Signs: Temp: 97.7  F (36.5  C) Temp src: Oral BP: 133/75 Pulse: 67   Resp: 25 SpO2: 96 % O2 Device: None (Room air)    Weight: 160 lbs 0 oz    General Appearance: Sleeping comfortably in bed in no acute respiratory distress  Respiratory: Normal breath sounds, no wheeze or rhonchi.    Cardiovascular: Normal heart sound, no murmur.  No rub.  GI: Soft, no tenderness, normal bowel sounds.  Skin: Dry, warm, no  rash.  Other: Grossly intact, no focal deficit.    Medical Decision Making       75 MINUTES SPENT BY ME on the date of service doing chart review, history, exam, documentation & further activities per the note.      Data     I have personally reviewed the following data over the past 24 hrs:    6.0  \   15.8   / 186     139 100 14.7 /  108 (H)   4.4 32 (H) 1.28 (H) \     ALT: 36 AST: 29 AP: 60 TBILI: 1.0   ALB: 4.4 TOT PROTEIN: 7.6 LIPASE: N/A     Trop: 7 BNP: 44     TSH: N/A T4: N/A A1C: 5.9 (H)     INR:  0.99 PTT:  27   D-dimer:  N/A Fibrinogen:  N/A       Imaging results reviewed over the past 24 hrs:   Recent Results (from the past 24 hour(s))   CTA Chest Abdomen Pelvis w Contrast    Narrative    EXAM: CTA CHEST ABDOMEN PELVIS W CONTRAST  LOCATION: [facility]  DATE: 2/13/2024    INDICATION: Chest pain.  COMPARISON: 06/20/2023  TECHNIQUE: CT angiogram chest abdomen pelvis during arterial phase of injection of IV contrast. 2D and 3D MIP reconstructions were performed by the CT technologist. Dose reduction techniques were used.   CONTRAST: [id number] 90ml    FINDINGS:   CT ANGIOGRAM CHEST, ABDOMEN, AND PELVIS: No aneurysm, dissection, intramural hematoma, or penetrating atheromatous ulcer. Atherosclerotic disease is present. There is approximately 50% vessel diameter narrowing of the left external iliac artery The   celiac artery is very small in caliber; the branch vessels are predominantly supplied by collateral flow. No central, lobar, or segmental PE.    LUNGS AND PLEURA: Emphysema. Minimal linear scarring in the posterior right upper lobe.    MEDIASTINUM/AXILLAE: Normal.    CORONARY ARTERY CALCIFICATION: Mild to moderate.    HEPATOBILIARY: Normal.    PANCREAS: Normal.    SPLEEN: Normal.    ADRENAL GLANDS: Normal.    KIDNEYS/BLADDER: 2 mm nonobstructing left renal calculus. Simple renal cysts do not require follow-up. Distended bladder.    BOWEL: Normal.    LYMPH NODES: Normal.    PELVIC  ORGANS: Normal.    MUSCULOSKELETAL: Normal.      Impression    IMPRESSION:  1.  No acute aortic syndrome. There is atherosclerotic disease including coronary artery disease. Narrowing of the celiac axis does not appear to be acute.   2.  No significant PE.  3.  Distended urinary bladder.

## 2024-02-13 NOTE — PROGRESS NOTES
At 1530, patient BP 82/47. Patient endorsed blurry vision while seeing black spots. Notified . EKG, 1L NS Bolus ordered; Bolus given, EKG shown NSR. Last blood pressure 102/55 and vision improving with fluid per patient statement.

## 2024-02-13 NOTE — SIGNIFICANT EVENT
Significant Event Note    Time of event: 5:26 PM February 13, 2024    Description of event:  Notified by nursing staff the patient has low blood pressure with 82/47 and she is some dizziness.    Plan:  IV fluid bolus, blood pressure did improve and dizziness is resolved   Add holding parameters to blood pressure medications    Discussed with: patient's family/emergency contact and bedside nurse    Jose Martin MD

## 2024-02-13 NOTE — CONSULTS
Care Management Initial Consult    General Information  Assessment completed with: Patient, Children, pt  Type of CM/SW Visit: Initial Assessment    Primary Care Provider verified and updated as needed: Yes   Readmission within the last 30 days: no previous admission in last 30 days      Reason for Consult: discharge planning, length of stay  Advance Care Planning: Advance Care Planning Reviewed: questions answered, no concerns identified          Communication Assessment  Patient's communication style: spoken language (English or Bilingual)             Cognitive  Cognitive/Neuro/Behavioral: WDL, speech, mood/behavior, motor response, level of consciousness, orientation  Level of Consciousness: alert     Orientation: oriented x 4  Mood/Behavior: calm, cooperative  Best Language: 0 - No aphasia  Speech: logical, clear    Living Environment:   People in home: child(olesya), adult     Current living Arrangements: house      Able to return to prior arrangements: yes       Family/Social Support:  Care provided by: self, child(olesya)  Provides care for: no one  Marital Status:   Children          Description of Support System: Supportive, Involved    Support Assessment: Adequate family and caregiver support, Adequate social supports    Current Resources:   Patient receiving home care services: No     Community Resources: None  Equipment currently used at home:    Supplies currently used at home: Hearing Aid Batteries (glasses)    Employment/Financial:  Employment Status:          Financial Concerns: none   Referral to Financial Worker: No       Does the patient's insurance plan have a 3 day qualifying hospital stay waiver?  No    Lifestyle & Psychosocial Needs:  Social Determinants of Health     Food Insecurity: Not on file   Depression: Not on file   Housing Stability: Not on file   Tobacco Use: Medium Risk (10/30/2023)    Patient History     Smoking Tobacco Use: Former     Smokeless Tobacco Use: Never     Passive  Exposure: Not on file   Financial Resource Strain: Not on file   Alcohol Use: Not on file   Transportation Needs: Not on file   Physical Activity: Not on file   Interpersonal Safety: Not on file   Stress: Not on file   Social Connections: Not on file       Functional Status:  Prior to admission patient needed assistance:   Dependent ADLs:: Independent  Dependent IADLs:: Independent  Assesssment of Functional Status: Not at baseline with ADL Functioning    Mental Health Status:  Mental Health Status: No Current Concerns       Chemical Dependency Status:                Values/Beliefs:  Spiritual, Cultural Beliefs, [employer] Practices, Values that affect care:                 Additional Information:  Assessed, lives w/family and dtr present, can transport, no svcs and independent at baseline. Discussed JESSICA.    Sindy Munoz RN

## 2024-02-13 NOTE — ED TRIAGE NOTES
Pt here with chest pain on/off for the last couple of weeks. He has multiple sx going on including h/a's, leg pain. He had a stroke this summer. He has spoken with a couple doctors this morning and yesterday who advised him to be seen in the ER.      Triage Assessment (Adult)       Row Name 02/13/24 0940          Triage Assessment    Airway WDL WDL        Respiratory WDL    Respiratory WDL WDL        Skin Circulation/Temperature WDL    Skin Circulation/Temperature WDL WDL        Cardiac WDL    Cardiac WDL X;chest pain        Chest Pain Assessment    Character stabbing

## 2024-02-13 NOTE — ED NOTES
Patient returned from CT.  Placed back onto bedside monitor.  BP improved- now normotensive.  States CP has decreased- rates 1/10 at this time.  Updated on plan of care, awaiting results.  Patient and daughter verbalized understanding.  Call light within reach, advised to call with needs or if pain increases- will continue plan of care.

## 2024-02-13 NOTE — ED NOTES
Patient reporting 5/10 right chest pain. Pain started after the doctor left the room. States it feels like a punch. States pain is tender to palpation of the chest wall and with deep inspiration. Denies shortness of breath or dizziness. Will notify provider and primary RN.

## 2024-02-13 NOTE — PHARMACY-ADMISSION MEDICATION HISTORY
Pharmacist Admission Medication History    Admission medication history is complete. The information provided in this note is only as accurate as the sources available at the time of the update.    Information Source(s): Patient, Family member, Clinic records, Hospital records, and CareEverywhere/SureScripts via in-person    Pertinent Information: he was prescribed Zetia yesterday and has not started yet.     Changes made to PTA medication list:  Added: None  Deleted: clopidogrel  Changed: lisinopril     Allergies reviewed with patient and updates made in EHR: yes    Medication History Completed By: Kobe Vega [facility] 2/13/2024 11:23 AM    Prior to Admission medications    Medication Sig Last Dose Taking? Auth Provider Long Term End Date   acetaminophen (TYLENOL) 325 MG tablet Take 2 tablets (650 mg) by mouth every 4 hours as needed for mild pain  Yes Juwan Hudson MD     aspirin 81 MG EC tablet Take 1 tablet (81 mg) by mouth daily 2/12/2024 at PM Yes Juwan Hudson MD     diclofenac (VOLTAREN) 1 % topical gel Apply 2 g topically 4 times daily as needed for moderate pain Past Week Yes Unknown, Entered By History     ezetimibe (ZETIA) 10 MG tablet Take 1 tablet (10 mg) by mouth daily  at hasn't picked up yet Yes Leticia Davis NP Yes    lisinopril (ZESTRIL) 10 MG tablet Take 10 mg by mouth every evening 2/12/2024 at pm Yes Reported, Patient No    lisinopril-hydrochlorothiazide (ZESTORETIC) 20-12.5 MG tablet Take 1 tablet by mouth every morning 2/13/2024 at am Yes Unknown, Entered By History Yes    rosuvastatin (CRESTOR) 40 MG tablet Take 1 tablet (40 mg) by mouth daily 2/13/2024 at am Yes Leticia Davis NP Yes

## 2024-02-13 NOTE — ED NOTES
[facility] ED Handoff Report    ED Chief Complaint: Chest pain    ED Diagnosis:  (R07.9) Chest pain, unspecified type  Comment: right chest pain that radiates into the left side of the chest   Plan: rates 0/10 at this time after Tylenol    (I16.0) Hypertensive urgency  Comment: initially 197/121.  Now normotensive after hydralazine and PO metoprolol.  /68.   Plan: continue to monitor        PMH:  No past medical history on file.     Code Status:  Prior     Falls Risk: No Band: Not applicable    Current Living Situation/Residence: lives with their son or daughter and lives in a house     Elimination Status: Continent: Yes     Activity Level: Independent    Patients Preferred Language:  English     Needed: No    Vital Signs:  /74   Pulse 61   Temp 98.7  F (37.1  C) (Temporal)   Resp 19   Wt 72.6 kg (160 lb)   SpO2 98%   BMI 25.82 kg/m       Cardiac Rhythm: sinus berta/NSR HR 50's-60's    Pain Score: 0/10    Is the Patient Confused:  No    Last Food or Drink: Last ate 02/12/24 around 1800.  Last had something to drink 02/13/24 at 0600.     Focused Assessment:  Episodes of right sided chest pain over the past few weeks.  States today pain radiated to his left chest.  States episodes occur mainly in the morning and only last for a few minutes.  Hx of stroke.  Takes anti hypertensives- last had at 0900 but arrived here hypertensive.      Tests Performed: Done: Labs and Imaging    Treatments Provided:  see MAR    Family Dynamics/Concerns: No    Family Updated On Visitor Policy: Yes    Plan of Care Communicated to Family: Yes    Who Was Updated about Plan of Care: patient's daughter    Belongings Checklist Done and Signed by Patient: Yes    Belongings Sent with Patient: yes    Medications sent with patient: no    Covid: asymptomatic , negative    Additional Information: patient is pleasant and cooperative.  Voices needs appropriately.     RN: Estefania Gonsalez RN   2/13/2024 1:35 PM

## 2024-02-14 ENCOUNTER — APPOINTMENT (OUTPATIENT)
Dept: CARDIOLOGY | Facility: HOSPITAL | Age: 69
End: 2024-02-14
Attending: INTERNAL MEDICINE
Payer: COMMERCIAL

## 2024-02-14 ENCOUNTER — APPOINTMENT (OUTPATIENT)
Dept: NUCLEAR MEDICINE | Facility: HOSPITAL | Age: 69
End: 2024-02-14
Attending: INTERNAL MEDICINE
Payer: COMMERCIAL

## 2024-02-14 ENCOUNTER — TELEPHONE (OUTPATIENT)
Dept: OTHER | Facility: CLINIC | Age: 69
End: 2024-02-14

## 2024-02-14 VITALS
DIASTOLIC BLOOD PRESSURE: 86 MMHG | SYSTOLIC BLOOD PRESSURE: 162 MMHG | OXYGEN SATURATION: 98 % | BODY MASS INDEX: 25.82 KG/M2 | WEIGHT: 160 LBS | RESPIRATION RATE: 16 BRPM | TEMPERATURE: 97.6 F | HEART RATE: 68 BPM

## 2024-02-14 LAB
ANION GAP SERPL CALCULATED.3IONS-SCNC: 8 MMOL/L (ref 7–15)
BUN SERPL-MCNC: 17.9 MG/DL (ref 8–23)
CALCIUM SERPL-MCNC: 8.8 MG/DL (ref 8.8–10.2)
CHLORIDE SERPL-SCNC: 107 MMOL/L (ref 98–107)
CREAT SERPL-MCNC: 1.26 MG/DL (ref 0.67–1.17)
CV STRESS CURRENT BP HE: NORMAL
CV STRESS CURRENT HR HE: 71
CV STRESS CURRENT HR HE: 79
CV STRESS CURRENT HR HE: 80
CV STRESS CURRENT HR HE: 81
CV STRESS CURRENT HR HE: 82
CV STRESS CURRENT HR HE: 83
CV STRESS CURRENT HR HE: 83
CV STRESS CURRENT HR HE: 84
CV STRESS CURRENT HR HE: 87
CV STRESS CURRENT HR HE: 98
CV STRESS CURRENT HR HE: 98
CV STRESS DEVIATION TIME HE: NORMAL
CV STRESS ECHO PERCENT HR HE: NORMAL
CV STRESS EXERCISE STAGE HE: NORMAL
CV STRESS FINAL RESTING BP HE: NORMAL
CV STRESS FINAL RESTING HR HE: 79
CV STRESS MAX HR HE: 99
CV STRESS MAX TREADMILL GRADE HE: 0
CV STRESS MAX TREADMILL SPEED HE: 0
CV STRESS PEAK DIA BP HE: NORMAL
CV STRESS PEAK SYS BP HE: NORMAL
CV STRESS PHASE HE: NORMAL
CV STRESS PROTOCOL HE: NORMAL
CV STRESS RESTING PT POSITION HE: NORMAL
CV STRESS RESTING PT POSITION HE: NORMAL
CV STRESS ST DEVIATION AMOUNT HE: NORMAL
CV STRESS ST DEVIATION ELEVATION HE: NORMAL
CV STRESS ST EVELATION AMOUNT HE: NORMAL
CV STRESS TEST TYPE HE: NORMAL
CV STRESS TOTAL STAGE TIME MIN 1 HE: NORMAL
DEPRECATED HCO3 PLAS-SCNC: 26 MMOL/L (ref 22–29)
EGFRCR SERPLBLD CKD-EPI 2021: 63 ML/MIN/1.73M2
GLUCOSE SERPL-MCNC: 95 MG/DL (ref 70–99)
HOLD SPECIMEN: NORMAL
LVEF ECHO: NORMAL
POTASSIUM SERPL-SCNC: 4.8 MMOL/L (ref 3.4–5.3)
RATE PRESSURE PRODUCT: NORMAL
SODIUM SERPL-SCNC: 141 MMOL/L (ref 135–145)
STRESS ECHO BASELINE DIASTOLIC HE: 89
STRESS ECHO BASELINE HR: 71
STRESS ECHO BASELINE SYSTOLIC BP: 158
STRESS ECHO CALCULATED PERCENT HR: 65 %
STRESS ECHO LAST STRESS DIASTOLIC BP: 69
STRESS ECHO LAST STRESS HR: 84
STRESS ECHO LAST STRESS SYSTOLIC BP: 124
STRESS ECHO TARGET HR: 153
TROPONIN T SERPL HS-MCNC: 9 NG/L

## 2024-02-14 PROCEDURE — 93016 CV STRESS TEST SUPVJ ONLY: CPT | Performed by: INTERNAL MEDICINE

## 2024-02-14 PROCEDURE — 93306 TTE W/DOPPLER COMPLETE: CPT

## 2024-02-14 PROCEDURE — 93306 TTE W/DOPPLER COMPLETE: CPT | Mod: 26 | Performed by: STUDENT IN AN ORGANIZED HEALTH CARE EDUCATION/TRAINING PROGRAM

## 2024-02-14 PROCEDURE — 250N000013 HC RX MED GY IP 250 OP 250 PS 637: Performed by: INTERNAL MEDICINE

## 2024-02-14 PROCEDURE — 99254 IP/OBS CNSLTJ NEW/EST MOD 60: CPT | Performed by: INTERNAL MEDICINE

## 2024-02-14 PROCEDURE — 84484 ASSAY OF TROPONIN QUANT: CPT | Performed by: INTERNAL MEDICINE

## 2024-02-14 PROCEDURE — 80048 BASIC METABOLIC PNL TOTAL CA: CPT

## 2024-02-14 PROCEDURE — 96361 HYDRATE IV INFUSION ADD-ON: CPT

## 2024-02-14 PROCEDURE — 78452 HT MUSCLE IMAGE SPECT MULT: CPT | Mod: 26 | Performed by: INTERNAL MEDICINE

## 2024-02-14 PROCEDURE — 93018 CV STRESS TEST I&R ONLY: CPT | Performed by: INTERNAL MEDICINE

## 2024-02-14 PROCEDURE — 99207 PR APP CREDIT; MD BILLING SHARED VISIT: CPT | Mod: FS | Performed by: EMERGENCY MEDICINE

## 2024-02-14 PROCEDURE — 78452 HT MUSCLE IMAGE SPECT MULT: CPT

## 2024-02-14 PROCEDURE — 93017 CV STRESS TEST TRACING ONLY: CPT

## 2024-02-14 PROCEDURE — 258N000003 HC RX IP 258 OP 636: Performed by: INTERNAL MEDICINE

## 2024-02-14 PROCEDURE — A9500 TC99M SESTAMIBI: HCPCS | Performed by: EMERGENCY MEDICINE

## 2024-02-14 PROCEDURE — 250N000011 HC RX IP 250 OP 636: Performed by: EMERGENCY MEDICINE

## 2024-02-14 PROCEDURE — 343N000001 HC RX 343: Performed by: EMERGENCY MEDICINE

## 2024-02-14 PROCEDURE — G0378 HOSPITAL OBSERVATION PER HR: HCPCS

## 2024-02-14 PROCEDURE — 36415 COLL VENOUS BLD VENIPUNCTURE: CPT | Performed by: INTERNAL MEDICINE

## 2024-02-14 PROCEDURE — 99239 HOSP IP/OBS DSCHRG MGMT >30: CPT | Mod: FS

## 2024-02-14 RX ORDER — AMLODIPINE BESYLATE 2.5 MG/1
2.5 TABLET ORAL DAILY
Qty: 30 TABLET | Refills: 0 | Status: SHIPPED | OUTPATIENT
Start: 2024-02-15 | End: 2024-04-09

## 2024-02-14 RX ORDER — REGADENOSON 0.08 MG/ML
0.4 INJECTION, SOLUTION INTRAVENOUS ONCE
Status: COMPLETED | OUTPATIENT
Start: 2024-02-14 | End: 2024-02-14

## 2024-02-14 RX ORDER — CAFFEINE CITRATE 20 MG/ML
60 SOLUTION INTRAVENOUS
Status: DISCONTINUED | OUTPATIENT
Start: 2024-02-14 | End: 2024-02-14

## 2024-02-14 RX ORDER — ALBUTEROL SULFATE 0.83 MG/ML
2.5 SOLUTION RESPIRATORY (INHALATION)
Status: DISCONTINUED | OUTPATIENT
Start: 2024-02-14 | End: 2024-02-14 | Stop reason: HOSPADM

## 2024-02-14 RX ORDER — AMINOPHYLLINE 25 MG/ML
50 INJECTION, SOLUTION INTRAVENOUS
Status: DISCONTINUED | OUTPATIENT
Start: 2024-02-14 | End: 2024-02-14

## 2024-02-14 RX ORDER — SODIUM CHLORIDE 9 MG/ML
INJECTION, SOLUTION INTRAVENOUS CONTINUOUS
Status: DISCONTINUED | OUTPATIENT
Start: 2024-02-14 | End: 2024-02-14 | Stop reason: HOSPADM

## 2024-02-14 RX ORDER — CAFFEINE 200 MG
200 TABLET ORAL
Status: DISCONTINUED | OUTPATIENT
Start: 2024-02-14 | End: 2024-02-14

## 2024-02-14 RX ADMIN — ROSUVASTATIN CALCIUM 40 MG: 40 TABLET, COATED ORAL at 09:35

## 2024-02-14 RX ADMIN — REGADENOSON 0.4 MG: 0.08 INJECTION, SOLUTION INTRAVENOUS at 08:50

## 2024-02-14 RX ADMIN — ACETAMINOPHEN 650 MG: 325 TABLET ORAL at 13:32

## 2024-02-14 RX ADMIN — Medication 8 MILLICURIE: at 07:47

## 2024-02-14 RX ADMIN — AMLODIPINE BESYLATE 2.5 MG: 2.5 TABLET ORAL at 09:34

## 2024-02-14 RX ADMIN — PANTOPRAZOLE SODIUM 40 MG: 40 TABLET, DELAYED RELEASE ORAL at 09:35

## 2024-02-14 RX ADMIN — Medication 32.4 MILLICURIE: at 09:30

## 2024-02-14 RX ADMIN — LISINOPRIL AND HYDROCHLOROTHIAZIDE 1 TABLET: 12.5; 2 TABLET ORAL at 09:34

## 2024-02-14 RX ADMIN — SODIUM CHLORIDE: 9 INJECTION, SOLUTION INTRAVENOUS at 05:44

## 2024-02-14 RX ADMIN — ACETAMINOPHEN 650 MG: 325 TABLET ORAL at 09:43

## 2024-02-14 RX ADMIN — Medication 81 MG: at 09:34

## 2024-02-14 RX ADMIN — EZETIMIBE 10 MG: 10 TABLET ORAL at 09:35

## 2024-02-14 ASSESSMENT — ACTIVITIES OF DAILY LIVING (ADL)
ADLS_ACUITY_SCORE: 34

## 2024-02-14 NOTE — PROGRESS NOTES
"PRIMARY DIAGNOSIS: \"GENERIC\" NURSING  OUTPATIENT/OBSERVATION GOALS TO BE MET BEFORE DISCHARGE:  ADLs back to baseline: Yes    Activity and level of assistance: Ambulating independently.    Pain status: Pain free.    Return to near baseline physical activity: Yes     Discharge Planner Nurse   Safe discharge environment identified: Yes  Barriers to discharge: Yes       Entered by: Dian Mak RN 2024 6:47 AM    Pt A&Ox4. VSS in RA. Denies pain, SOB/. NPO midnight. For Lexiscan stress test. Running NS 75ml/hr.      Please review provider order for any additional goals.   Nurse to notify provider when observation goals have been met and patient is ready for discharge.  "

## 2024-02-14 NOTE — TELEPHONE ENCOUNTER
Pt referred to Dr. Robertson by Leticia Davis NP for hyperlipidemia.     Routing to scheduling to coordinate the following:    NEW VASCULAR PATIENT consult with Dr. Robertson  Please schedule this at next available    Appt note:  Referral from Leticia Davis NP for hyperlipidemia.    Nickie DOVER, RN    [facility]  Office: [phone]  Fax: [phone]

## 2024-02-14 NOTE — PLAN OF CARE
Problem: Adult Inpatient Plan of Care  Goal: Optimal Comfort and Wellbeing  Outcome: Progressing     Problem: Adult Inpatient Plan of Care  Goal: Readiness for Transition of Care  Outcome: Progressing   Goal Outcome Evaluation:    Patient A&Ox4, makes needs known. Patient normotensive upon arrival to ED-33. Patient got hypotensive; see prior note for interventions. Patient eventually able to become normotensive again. Tolerating oral intake. After fluid bolus, patient bladder scanned for 786. Patient initially not able to void, but eventually able to walk ad franklyn stand by assist to bathroom and voided 200 mL. Refused straight catheterization. Voided x1 more time prior to leaving ED room. Stated he had a headache but denied any intervention. NS @ 75 initiated prior to leaving. On cardiac telemetry; shown NSR. Gave report to PAT Brady. Patient transferred around 1830. Family at bedside, aware of transfer to unit.

## 2024-02-14 NOTE — CONSULTS
Cardiology Consult Note    Thank you, Dr. Moffett, for asking the [facility] team to see Rich Moffett in consultation at [facility] to evaluate chest pain.      Assessment:   1. Chest pain: In assessment, the patient's chest pain is unlikely to be ischemic as it has not been worsened by exertion or relieved with rest. It is located to the right of the sternum and radiates either towards the stomach or left chest area; however comes on in the morning and is followed by headaches in the setting of found hypertensive urgency. Troponins are negative and ECG with no ischemic changes. CTA CAP did note mild to moderate coronary atherosclerotic disease.  Nuclear stress test ordered and is currently pending.  2.  CAD, with evidence of coronary artery calcification on chest CT.  If no evidence of ischemia on nuclear stress testing, would focus on aggressive risk factor modification including optimizing diet and exercise along with rosuvastatin and aspirin therapy.  3. Hypertensive urgency: Patient with a history of hypertension managed by primary care provider and on lisinopril-hydrochlorothiazide 20-12.6mg prior to admission. The recent symptoms of headaches and chest pain are most likely to be due to hypertension. Patient denies symptoms of JEAN. Amlodipine was added to patient's current regimen and pressures have been normotensive today. Will need to continue to follow with primary care provider for management of hypertension.  4. Hyperlipidemia: Last lipid panel 1/25/24 with . Currently on rosuvastatin 40mg after resistant to atorvastatin, ezetimibe, ASA. Manage through primary care provider outpatient.  5. Carotid stenosis with previous TIA, s/p L carotid endarterectomy (6/23/23): Most recent bilateral carotid ultrasound 2/9 with patent L carotid and R carotid with <50% stenosis. Follows with outpatient vascular clinic.    Clinically Significant Risk Factors Present on  Admission                # Drug Induced Platelet Defect: home medication list includes an antiplatelet medication   # Hypertension: Noted on problem list          # Financial/Environmental Concerns: none         Plan:   1. Continue PTA lisinopril-hydrochlorothiazide 20-12.5mg and added amlodipine 2.5mg daily, monitor pressures and will adjust if needed  2. Await results of echo and regadenosine nuclear med stress test  3. Continue PTA rosuvastatin 40mg, ezetimibe 10mg, ASA 81mg  4. If no findings on echo and regadenosine nuc medicine stress test, plan to follow up with primary care provider for outpatient management of hypertension, hyperlipidemia, CAD    Primary cardiologist: None (PCP: Dr. Timbo Franco at [facility])     Current History:   Mr. Rich Moffett is a 68 year old male with a history of hypertension, hyperlipidemia, GERD, and carotid stenosis with previous TIA s/p L carotid endarterectomy (6/23/23). He presents with a two week history of chest pain and headaches. He describes the chest pain as right-sided and radiating to the stomach and more recently to the left chest. He notices the chest pain mostly in the morning on awakening. Chest pain is not induced with exercise.  No radiation to the arms jaw or back.  Denies any associated diaphoresis or shortness of breath.  Patient is quite active, walking 10,000-35,000 steps daily and has not noticed chest pain or diaphoresis with this exertion. He does note some shortness of breath with exertion but is able to recover within 10-15 minutes. Some leg pain noted when walking but this is described as being over the shins. Appetite has generally been depressed as of late and headaches have accompanied the recent chest pain. Denies excessive snoring, daytime fatigue, apneic spells while asleep. He follows with primary care physician for management of hyperlipidemia and hypertension and follows in vascular clinic s/p endarterectomy.    On presentation, Mr. Moffett  was initially found to have BPs of 216/107, 197/121, initially treated with hydralazine 20mg and injection x1 and metoprolol 25mg PO. ECG was obtained showing normal sinus rhythm and troponins negative. CTA CAP negative for dissection but did show atherosclerotic disease including mild to moderate coronary artery disease. Patient chest pain improved s/p metoprolol and hydralazine with decrease in blood pressure to normal range. One episode of hypotension to 82/47 with symptoms of dizziness, improved with fluid bolus. Admitted to short stay unit for cardiac telemetry and observation with resumption of PTA lisinopril-hydrochlorothiazide and addition of amlodipine 2.5mg daily. Now pending echocardiogram and nuclear medicine stress testing.       Past Medical History:   Hypertension  Hyperlipidemia (last lipid panel 1/25/24 with )  GERD  Carotid stenosis with TIA s/p L carotid endarterectomy in 6/23 (follows with vascular clinic outpatient, most recent bilateral US 2/9 patent L carotid with R carotid <50% stenosis)    Past Surgical History:     Past Surgical History:   Procedure Laterality Date    ENDARTERECTOMY CAROTID Left 6/23/2023    Procedure: LEFT CAROTID ENDARTERECTOMY WITH ELECTROENCEPHALOGRAM AND INTRAOPERATIVE ULTRASOUND;  Surgeon: Kimberli Macdonald MD;  Location:  OR       Family History:   Father with unknown cardiac disease starting in 40s (passed away age 65)  Brother with history of stroke, MI, T2DM, renal failure on dialysis (passed away age 62)  Mother with history of renal failure, dialysis    Social History:    reports that he has quit smoking. His smoking use included cigarettes. He has never used smokeless tobacco. He reports current alcohol use. He reports that he does not use drugs.    Meds:     Current Facility-Administered Medications:     acetaminophen (TYLENOL) tablet 650 mg, 650 mg, Oral, Q4H PRN **OR** acetaminophen (TYLENOL) Suppository 650 mg, 650 mg, Rectal, Q4H PRN,  Jose Martin MD    acetaminophen (TYLENOL) tablet 650 mg, 650 mg, Oral, Q4H PRN, Jose Martin MD, 650 mg at 02/13/24 1900    albuterol (PROVENTIL) neb solution 2.5 mg, 2.5 mg, Nebulization, Once PRN, Joni Moffett MD    aminophylline 50 mg, 50 mg, Intravenous, Once PRN, Joni Moffett MD    amLODIPine (NORVASC) tablet 2.5 mg, 2.5 mg, Oral, Daily, Jose Martin MD    aspirin EC tablet 81 mg, 81 mg, Oral, Daily, Jose Martin MD, 81 mg at 02/13/24 1428    caffeine (NO-DOZE) tablet 200 mg, 200 mg, Oral, Once PRN, Joni Moffett MD    caffeine citrate (CAFCIT) injection 60 mg, 60 mg, Intravenous, Once PRN, Joni Moffett MD    diclofenac (VOLTAREN) 1 % topical gel 2 g, 2 g, Topical, 4x Daily PRN, Jose Martin MD    ezetimibe (ZETIA) tablet 10 mg, 10 mg, Oral, Daily, Jose Martin MD    hydrALAZINE (APRESOLINE) injection 10 mg, 10 mg, Intravenous, Q6H PRN, Jose Martin MD    lisinopril-hydrochlorothiazide (ZESTORETIC) 20-12.5 MG per tablet 1 tablet, 1 tablet, Oral, QAM, Jose Martin MD    ondansetron (ZOFRAN ODT) ODT tab 4 mg, 4 mg, Oral, Q6H PRN **OR** ondansetron (ZOFRAN) injection 4 mg, 4 mg, Intravenous, Q6H PRN, Jose Martin MD    pantoprazole (PROTONIX) EC tablet 40 mg, 40 mg, Oral, QAM AC, Jose Martin MD    rosuvastatin (CRESTOR) tablet 40 mg, 40 mg, Oral, Daily, Jose Martin MD    senna-docusate (SENOKOT-S/PERICOLACE) 8.6-50 MG per tablet 1 tablet, 1 tablet, Oral, BID PRN **OR** senna-docusate (SENOKOT-S/PERICOLACE) 8.6-50 MG per tablet 2 tablet, 2 tablet, Oral, BID PRN, Jose Martin MD    sodium chloride 0.9 % infusion, , Intravenous, Continuous, Jose Martin MD, Last Rate: 75 mL/hr at 02/14/24 0544, New Bag at 02/14/24 0544    technetium sestamibi 2 UD per study (Tc99m MiBi) radioisotope injection 3-42 millicurie, 3-42 millicurie, Intravenous, Once, Joni Moffett MD   amLODIPine  2.5 mg Oral Daily    aspirin  81 mg Oral Daily     ezetimibe  10 mg Oral Daily    lisinopril-hydrochlorothiazide  1 tablet Oral QAM    pantoprazole  40 mg Oral QAM AC    rosuvastatin  40 mg Oral Daily    technetium sestamibi 2 UD per study  3-42 millicurie Intravenous Once       Allergies:   Patient has no known allergies.    Review of Systems:   A 12 point comprehensive review of systems was negative except as noted in the current history.    Objective:      Physical Exam  Body mass index is 25.82 kg/m .  /74 (BP Location: Left arm)   Pulse 79   Temp 97.8  F (36.6  C) (Oral)   Resp 16   Wt 72.6 kg (160 lb)   SpO2 97%   BMI 25.82 kg/m      General Appearance:   Sitting comfortably on hospital bed, no acute distress   HEENT:  Atraumatic   Neck: No JVD observed   Chest: No sternal scar. Equal chest expansion.   Lungs:   Clear to auscultation bilaterally, no wheezes or crackles.   Cardiovascular:   Regular rate and rhythm with normal S1 and S2. No murmurs.   Abdomen:  Non-distended, non-tender.   Extremities: No lower extremity edema   Skin: Warm and dry, well-perfused   Neurologic: Grossly intact, moving all extremities spontaneously without focal deficits.             Cardiographics (personally reviewed)  Pending echocardiogram    2/14/24 NM stress test:  Preliminary ECG report: Negative for inducible ischemic EKG changes. There were no arrhythmias during stress. There were no arrhythmias during recovery.  Nuclear images pending.    2/13/24 ECG:  Normal sinus rhythm. No ST changes or signs of ischemia.    Imaging (personally reviewed)  2/13/24 CTA CAP:  1.  No acute aortic syndrome. There is atherosclerotic disease including coronary artery disease. Narrowing of the celiac axis does not appear to be acute.   2.  No significant PE.  3.  Distended urinary bladder.    2/9/24 US Carotid Bilateral:  1. Right side: Degree of stenosis of the internal carotid artery: lessthan 50%.   2. Left side: Patent left carotid endarterectomy site.   3. Antegrade flow in  "both vertebral arteries.    Lab Review (personally reviewed all results)  Recent Labs   Lab Test 01/25/24  0838   CHOL 223*   HDL 43   *   TRIG 297*     Recent Labs   Lab Test 01/25/24  0838 10/12/23  0845 07/28/23  1021   * 92 85     Recent Labs   Lab Test 02/14/24  0656      POTASSIUM 4.8   CHLORIDE 107   CO2 26   GLC 95   BUN 17.9   CR 1.26*   GFRESTIMATED 63   JASMINE 8.8     Recent Labs   Lab Test 02/14/24  0656 02/13/24  1002 09/11/23  1042   CR 1.26* 1.28* 1.27*     Recent Labs   Lab Test 02/13/24  1002 06/20/23  1507   A1C 5.9* 5.9*          Recent Labs   Lab Test 02/13/24  1002   WBC 6.0   HGB 15.8   HCT 46.9   MCV 84        Recent Labs   Lab Test 02/13/24  1002 06/23/23  0851 06/21/23  0842   HGB 15.8 17.0 14.2    No results for input(s): \"TROPONINI\" in the last 42006 hours.  Recent Labs   Lab Test 02/13/24  1002   NTBNPI 44     Recent Labs   Lab Test 06/21/23  0842   TSH 0.58     Recent Labs   Lab Test 02/13/24  1002 06/20/23  1036   INR 0.99 1.01        Nia William  Medical Student, Year 4    I personally interviewed and examined the patient with the medical student.  Case was discussed.  Concur with findings and recommendations as outlined in note above.        "

## 2024-02-14 NOTE — PROGRESS NOTES
"PRIMARY DIAGNOSIS: \"GENERIC\" NURSING  OUTPATIENT/OBSERVATION GOALS TO BE MET BEFORE DISCHARGE:  ADLs back to baseline: Yes    Activity and level of assistance: Ambulating independently.    Pain status: Improved-controlled with oral pain medications.    Return to near baseline physical activity: Yes     Discharge Planner Nurse   Safe discharge environment identified: Yes  Barriers to discharge: Yes       Entered by: Jose Antonio Hubbard RN 02/13/2024 11:58 PM     A & O x 4. VSS on RA. Pain addressed with PRN medication. Tele: NSR. NS infusing @ 75 mL/hr via L PIV. Family stopped by for support. Cardiac diet, tolerating well. NPO @ midnight for stress test (Lexiscan) tomorrow. Up independently. Nursing continue to monitor.  "

## 2024-02-14 NOTE — PROGRESS NOTES
"PRIMARY DIAGNOSIS: \"GENERIC\" NURSING  OUTPATIENT/OBSERVATION GOALS TO BE MET BEFORE DISCHARGE:  ADLs back to baseline: Yes    Activity and level of assistance: Ambulating independently.    Pain status: Pain free.    Return to near baseline physical activity: Yes     Discharge Planner Nurse   Safe discharge environment identified: Yes  Barriers to discharge: Yes       Entered by: Dian Mak RN 02/14/2024 3:13 AM    Pt A&Ox4. VSS in RA. Denies pain. On Tele with NSR. NPO midnight for Lexiscan today.      Please review provider order for any additional goals.   Nurse to notify provider when observation goals have been met and patient is ready for discharge.  "

## 2024-02-14 NOTE — DISCHARGE SUMMARY
[facility]  Hospitalist Discharge Summary      Date of Admission:  2/13/2024  Date of Discharge:  2/14/2024  4:15 PM  Discharging Provider: Yohana Akins PA-C  Discharge Service: Hospitalist Service    Discharge Diagnoses   Hypertensive Urgency   Atypical Chest Pain  Headache   CHRISTI    Clinically Significant Risk Factors          Follow-ups Needed After Discharge   Follow-up Appointments     Follow-up and recommended labs and tests       Follow up with primary care provider, Timbo Franco, within 7 days to   evaluate medication change and for hospital follow- up. The following   labs/tests are recommended: BMP to recheck kidney function. Please discuss   the following with your doctor: blood pressure medications and HgbA1C   result.            Discharge Disposition   Discharged to home  Condition at discharge: Stable    Hospital Course   Rich Moffett is a 68 year old male with past medical history of HTN, HLD, GERD, and prior stroke who was admitted 2/13/2024 for hypertensive urgency and evaluation of chest pain. Patient presented to the ED for evaluation of 2 weeks of intermittent, episodic right sided chest pain. Patient was found to be hypertensive with SBP in the 210s at presentation to the ED. Delta troponin negative, no significant changes on EKG. Patient had unremarkable stress testing done 2/14 without inducible ischemic changes, echo done 2/14 showing EF 65-70%, normal LV function/size, hyperdynamic LV function and no wall motion abnormalities. Patient had resolution of chest pain without recurrence while hospitalized. Blood pressures stabilized after receiving antihypertensives in the ED and resuming home lisinopril/hydrochlorothiazide with added amlodipine. Patient discharged home on previous lisinopril and lisinopril-hydrochlorothiazide regimen with added amlodipine and to follow-up with primary care for continued management.      Hypertensive urgency  - Significant elevation  blood pressure admission to 216/107  - Patient admits compliance with his medications  - PTA regimen: lisinopril-hydrochlorothiazide 20-12.5 mg q AM in addition to lisinopril 10 mg at bedtime   - Blood pressure improving in the ER after receiving hydralazine and metoprolol, amlodipine   - Continue home lisinopril/hydrochlorothiazide and add amlodipine with PRN hydralazine   - Continue to monitor blood pressure  - Echo done 2/14: Normal LV function/size, hyperdynamic LV function. EF 65-70%, no regional wall motion abnormalities   - Appreciate cardiology consult  - Will discharge patient home on previous regimen of lisinopril-hydrochlorothiazide qAM and lisinopril at bedtime, with the addition of amlodipine 2.5 mg qday.   - Patient to monitor BP at home, close follow-up with PCP for management      Atypical Chest Pain, non-ACS  R sided, intermittent x 2 weeks, some radiation to stomach, left chest  Had resolution of symptoms while hospitalized   - CT Chest/A/P: no acute findings, did note atherosclerotic disease including coronary artery disease   - Delta trop negative   - EKG in ED without ST changes, significant changes from previous   - Continue to monitor telemetry with serial troponin  - Continue home aspirin  - Nuclear Stress Testing done 2/14: negative for inducible myocardial ischemia or infarct, low risk for future ischemic events, hyperdynamic LV function     Lightheadedness   Headache   Suspect likely secondary elevated blood pressures. Describes starting at base of neck and wrapping to left side.    - Has had intermittent HA x several months, not necessarily worse from baseline today  - Recently had brain MR, head CT/CTA (6/2023) relatively unremarkable aside from L ICA stenosis (pt s/p L carotid endarterectomy)  - Tylenol PRN, did improve sxs    - Outpatient follow-up with PCP/neurology if continuing      Hypotension, transient   - Did have BP of 75/48 with associated dizziness after receiving  antihypertensives in ED  - IV bolus given, BP improved   - Holding parameters for medications      CHRISTI  - Cr 1.28, baseline appears around 1.1   - Received IVF overnight   - Trend BMP      Elevated A1C   - A1C 5.9   - Not on any home DM meds   - PCP follow-up      Carotid Stenosis w Previous TIA   S/p L Carotid Endarterectomy   - Most recent bilateral carotid ultrasound 2/9 with patent L carotid and R carotid with <50% stenosis   - follows outpatient with vascular surgery      Hyperlipidemia  - Recent lipid panel shows elevation of LDL, TGs and total.  - Resume home Zetia and Crestor       Consultations This Hospital Stay   CARE MANAGEMENT / SOCIAL WORK IP CONSULT  CARDIOLOGY IP CONSULT    Code Status   Full Code    Time Spent on this Encounter   I, Yohana Akins PA-C, personally saw the patient today and greater than or equal to 30 minutes discharging this patient.     This case was discussed with the Attending Physician, Dr. Emmanuel Moffett, who independently met with and assessed the patient and who is in agreement with the disposition and discharge.    Yohana Akins PA-C  [facility] EXTENDED RECOVERY AND SHORT STAY  [address] [place] [id number]  Phone: [phone]  Fax: [phone]  ______________________________________________________________________    Physical Exam   Vital Signs: Temp: 97.6  F (36.4  C) Temp src: Oral BP: (!) 162/86 Pulse: 68   Resp: 16 SpO2: 98 % O2 Device: None (Room air)    Weight: 160 lbs 0 oz    Constitutional: awake, alert, no apparent distress, and appears stated age  Respiratory: No increased work of breathing, no accessory muscle use, clear to auscultation bilaterally, no crackles or wheezing  Cardiovascular: Regular rate and rhythm, normal S1 and S2, no S3 or S4, and no murmur noted  Musculoskeletal: no lower extremity pitting edema present. 2+ DP pulses  Neuropsychiatric: Appropriate mood, affect and eye contact. Cooperative.       Primary Care  Physician   Timbo Franco    Discharge Orders      Reason for your hospital stay    Right sided chest pain   Hypertensive Urgency   Headache     Follow-up and recommended labs and tests     Follow up with primary care provider, Timbo Franco, within 7 days to evaluate medication change and for hospital follow- up. The following labs/tests are recommended: BMP to recheck kidney function. Please discuss the following with your doctor: blood pressure medications and HgbA1C result.     Activity    Your activity upon discharge: activity as tolerated     Diet    Follow this diet upon discharge: Low carbohydrate, Low cholesterol, and Low salt       Significant Results and Procedures   Most Recent 3 BMP's:  Recent Labs   Lab Test 02/14/24  0656 02/13/24  1002 09/11/23  1042    139 137   POTASSIUM 4.8 4.4 4.1   CHLORIDE 107 100 102   CO2 26 32* 18*   BUN 17.9 14.7 29.7*   CR 1.26* 1.28* 1.27*   ANIONGAP 8 7 17*   JASMINE 8.8 9.6 9.1   GLC 95 108* 93     Most Recent Hemoglobin A1c:  Recent Labs   Lab Test 02/13/24  1002   A1C 5.9*   ,   Results for orders placed or performed during the hospital encounter of 02/13/24   CTA Chest Abdomen Pelvis w Contrast    Narrative    EXAM: CTA CHEST ABDOMEN PELVIS W CONTRAST  LOCATION: [facility]  DATE: 2/13/2024    INDICATION: Chest pain.  COMPARISON: 06/20/2023  TECHNIQUE: CT angiogram chest abdomen pelvis during arterial phase of injection of IV contrast. 2D and 3D MIP reconstructions were performed by the CT technologist. Dose reduction techniques were used.   CONTRAST: [id number] 90ml    FINDINGS:   CT ANGIOGRAM CHEST, ABDOMEN, AND PELVIS: No aneurysm, dissection, intramural hematoma, or penetrating atheromatous ulcer. Atherosclerotic disease is present. There is approximately 50% vessel diameter narrowing of the left external iliac artery The   celiac artery is very small in caliber; the branch vessels are predominantly supplied by collateral flow. No  central, lobar, or segmental PE.    LUNGS AND PLEURA: Emphysema. Minimal linear scarring in the posterior right upper lobe.    MEDIASTINUM/AXILLAE: Normal.    CORONARY ARTERY CALCIFICATION: Mild to moderate.    HEPATOBILIARY: Normal.    PANCREAS: Normal.    SPLEEN: Normal.    ADRENAL GLANDS: Normal.    KIDNEYS/BLADDER: 2 mm nonobstructing left renal calculus. Simple renal cysts do not require follow-up. Distended bladder.    BOWEL: Normal.    LYMPH NODES: Normal.    PELVIC ORGANS: Normal.    MUSCULOSKELETAL: Normal.      Impression    IMPRESSION:  1.  No acute aortic syndrome. There is atherosclerotic disease including coronary artery disease. Narrowing of the celiac axis does not appear to be acute.   2.  No significant PE.  3.  Distended urinary bladder.     Echocardiogram Complete     Value    LVEF  65-70%    [facility]    [id number]  [id number]  [id number]  [id number]^FREDDY^[place] [address]     Name: HELEN CAMPBELL  MRN: [id number]  : 1955  Study Date: 2024 11:57 AM  Age: 68 yrs  Gender: Male  Patient Location: [facility]  Reason For Study: Hypertension (HTN)  Ordering Physician: FREDDY BERMAN  Performed By: NAMAN     BSA: 1.8 m2  Height: 66 in  Weight: 160 lb  HR: 65  BP: 133/74 mmHg  ______________________________________________________________________________  Procedure  Complete Portable Echo Adult. Adequate quality two-dimensional was performed  and interpreted.  ______________________________________________________________________________  Interpretation Summary     The left ventricle is normal in size. There is normal left ventricular wall  thickness.  Hyperdynamic left ventricular function The visual ejection fraction is 65-70%.  No regional wall motion abnormalities noted.     The right ventricle is borderline dilated.  The right ventricular systolic function is normal.  Normal left atrial size.  Right atrial size is normal.  Mild (35-45mmHg)  pulmonary hypertension is present.  IVC diameter <2.1 cm collapsing >50% with sniff suggests a normal RA pressure  of 3 mmHg.  ______________________________________________________________________________  Left Ventricle  The left ventricle is normal in size. There is normal left ventricular wall  thickness. Hyperdynamic left ventricular function. The visual ejection  fraction is 65-70%. No regional wall motion abnormalities noted.     Right Ventricle  The right ventricle is borderline dilated. The right ventricular systolic  function is normal.     Atria  Normal left atrial size. Right atrial size is normal.     Mitral Valve  Mitral valve leaflets appear normal. There is mild mitral annular  calcification. There is trace mitral regurgitation. There is no mitral valve  stenosis.     Tricuspid Valve  Tricuspid valve leaflets appear normal. There is trace tricuspid  regurgitation. Mild (35-45mmHg) pulmonary hypertension is present.     Aortic Valve  The aortic valve is trileaflet. Aortic valve leaflets appear normal. No aortic  regurgitation is present. No aortic stenosis is present.     Pulmonic Valve  The pulmonic valve is not well visualized. There is trace pulmonic valvular  regurgitation.     Vessels  The aorta root is normal. IVC diameter <2.1 cm collapsing >50% with sniff  suggests a normal RA pressure of 3 mmHg.     Pericardium  There is no pericardial effusion.     Rhythm  Sinus rhythm was noted.  ______________________________________________________________________________  MMode/2D Measurements & Calculations     IVSd: 0.95 cm  LVIDd: 4.2 cm  LVIDs: 2.8 cm  LVPWd: 0.88 cm  FS: 33.7 %  LV mass(C)d: 123.6 grams  LV mass(C)dI: 68.0 grams/m2  LA dimension: 3.6 cm  asc Aorta Diam: 3.2 cm  LVOT diam: 2.0 cm  LVOT area: 3.2 cm2  Asc Ao diam index BSA (cm/m2): 1.8  Asc Ao diam index Ht(cm/m): 1.9  LA Volume (BP): 45.5 ml     LA Volume Index (BP): 25.0 ml/m2  LA Volume Indexed (AL/bp): 27.3 ml/m2  RV Base: 4.1  cm  RWT: 0.41  TAPSE: 2.0 cm     Doppler Measurements & Calculations  MV E max salvador: 94.7 cm/sec  MV A max salvador: 100.7 cm/sec  MV E/A: 0.94  MV dec slope: 543.3 cm/sec2  MV dec time: 0.17 sec     PA V2 max: 110.8 cm/sec  PA max P.9 mmHg  PA acc time: 0.07 sec  TR max salvador: 302.7 cm/sec  TR max P.6 mmHg  RVSP(TR): 41.6 mmHg  E/E' avg: 10.7  Lateral E/e': 9.9  Medial E/e': 11.5  RV S Salvador: 14.0 cm/sec     ______________________________________________________________________________  Report approved by: Fran Appiah 2024 02:34 PM         NM Lexiscan stress test     Value    Pharmacologic Protocol Lexiscan    Test Type Pharmacological    Baseline HR 71    Baseline Systolic     Baseline Diastolic BP 89    Last Stress HR 84    Last Stress Systolic     Last Stress Diastolic BP 69    Target     PERCENT HR 85%    ST Deviation Elevation I 0.2mm    Deviation Time III -0.6mm    ST Elevation Amount V2 0.6mm    ST Deviation Amount he aVR -0.5mm    Final Resting /68    Final Resting HR 79    Max Treadmill Speed 0.0    Max Treadmill Grade 0.0    Peak Systolic /58    Peak Diastolic /69    Max HR  99    Stress Phase Resting    Stress Resting Pt Position STANDING    Current HR 79    Current /86    Stress Phase Resting    Stress Resting Pt Position MANUAL EVENT    Current HR 71    Current /89    Stress Phase Stress    Stage Minute EXE 00:00    Exercise Stage STAGE 2    Current HR 80    Current /86    Stress Phase Stress    Stage Minute EXE 01:00    Exercise Stage STAGE 3    Current HR 80    Current /86    Stress Phase Stress    Stage Minute EXE 01:55    Exercise Stage STAGE 3    Current HR 98    Current /69    Stress Phase Stress    Stage Minute EXE 02:00    Exercise Stage STAGE 4    Current HR 98    Current /69    Stress Phase Stress    Stage Minute EXE 03:00    Exercise Stage STAGE 5    Current HR 87    Current /69    Stress Phase Stress     Stage Minute EXE 04:00    Exercise Stage STAGE 6    Current HR 84    Current /69    Stress Phase Stress    Stage Minute EXE 04:00    Exercise Stage STAGE 6    Current HR 84    Current /69    Stress Phase Recovery    Stage Minute REC 00:23    Exercise Stage Recovery    Current HR 82    Current /58    Stress Phase Recovery    Stage Minute REC 00:59    Exercise Stage Recovery    Current HR 79    Current /58    Stress Phase Recovery    Stage Minute REC 01:48    Exercise Stage Recovery    Current HR 83    Current /64    Stress Phase Recovery    Stage Minute REC 01:59    Exercise Stage Recovery    Current HR 83    Current /64    Stress Phase Recovery    Stage Minute REC 02:59    Exercise Stage Recovery    Current HR 84    Current /64    Stress Phase Recovery    Stage Minute REC 03:28    Exercise Stage Recovery    Current HR 81    Current /68    Stress Phase Recovery    Stage Minute REC 03:59    Exercise Stage Recovery    Current HR 80    Current /68    Stress Phase Recovery    Stage Minute REC 04:01    Exercise Stage Recovery    Current HR 79    Current /68    Max Predicted HR  65    Rate Pressure Product 12,276.0    Narrative      The nuclear stress test is negative for inducible myocardial ischemia   or infarction.    The patient is at a low risk of future cardiac ischemic events.    Left ventricular function is hyperdynamic.    The left ventricular ejection fraction at stress is greater than 70%.    There is no prior study for comparison.         Discharge Medications   Discharge Medication List as of 2/14/2024  3:57 PM        START taking these medications    Details   amLODIPine (NORVASC) 2.5 MG tablet Take 1 tablet (2.5 mg) by mouth daily, Disp-30 tablet, R-0, E-Prescribe           CONTINUE these medications which have NOT CHANGED    Details   acetaminophen (TYLENOL) 325 MG tablet Take 2 tablets (650 mg) by mouth every 4 hours as needed for mild pain,  OTC      aspirin 81 MG EC tablet Take 1 tablet (81 mg) by mouth daily, Disp-120 tablet, R-3, E-Prescribe      diclofenac (VOLTAREN) 1 % topical gel Apply 2 g topically 4 times daily as needed for moderate pain, Historical      ezetimibe (ZETIA) 10 MG tablet Take 1 tablet (10 mg) by mouth daily, Disp-90 tablet, R-1, E-Prescribe      lisinopril (ZESTRIL) 10 MG tablet Take 10 mg by mouth every evening, Historical      lisinopril-hydrochlorothiazide (ZESTORETIC) 20-12.5 MG tablet Take 1 tablet by mouth every morning, Historical      rosuvastatin (CRESTOR) 40 MG tablet Take 1 tablet (40 mg) by mouth daily, Disp-90 tablet, R-0, E-Prescribe           Allergies   No Known Allergies

## 2024-02-14 NOTE — PLAN OF CARE
PRIMARY DIAGNOSIS: ACUTE PAIN  OUTPATIENT/OBSERVATION GOALS TO BE MET BEFORE DISCHARGE:  1. Pain Status: Improved-controlled with oral pain medications.    2. Return to near baseline physical activity: Yes    3. Cleared for discharge by consultants (if involved): No    Discharge Planner Nurse   Safe discharge environment identified: Yes  Barriers to discharge: Yes       Entered by: Zayra Chowdary RN 02/14/2024 12:23 PM     Please review provider order for any additional goals.   Nurse to notify provider when observation goals have been met and patient is ready for discharge.Goal Outcome Evaluation:

## 2024-02-14 NOTE — DISCHARGE SUMMARY
Pt has been discharged with all personal belongings and the daughter is taking pt home. Discharged summary has been reviewed with Pt and Pt and daughter verbalized understanding.                  Retention Suture Text: Retention sutures were placed to support the closure and prevent dehiscence.

## 2024-02-15 NOTE — PROGRESS NOTES
Vascular Surgery Progress Note    Rich Moffett is a 68 year old male with a history of hypertension, DL, GERD, who was admitted in June for concerns of TIA based on dysarthria in the setting of baseline neurocognitive disorder. He underwent a left carotid endarterectomy on 6/23/2023 with Dr. Macdonald. He has been having issues meeting LDL goal of less than 70 given vascular comorbidities switched to Crestor. Today he has a telephone visit to review imaging in addition to his labs.     His carotid duplex shows less than 50% stenosis bilaterally. Concerned about his lipid results     3 wk ago 4 mo ago 6 mo ago 8 mo ago 1 yr ago 6 yr ago 7 yr ago      Cholesterol  <200 mg/dL 223 High  151 151 190 201 High  R 210 High  R 212 High  R    Triglycerides  <150 mg/dL 297 High  95 139 72 145 R 136 R 146 R    Direct Measure HDL  >=40 mg/dL 43 40 38 Low  45 41 CM 43 44    LDL Cholesterol Calculated  <=100 mg/dL 121 High  92 85 131 High  131 High  R 140 High  R 139 High  R    Non HDL Cholesterol  <130 mg/dL 180 High  1            He reports that he is compliant with his medications, denies any issues with his Crestor. Started on Zetia 10 mg and recommended semi-urgent visit with Dr. Robertson with Vascular Medicine. Of note, urged to go to ED as he is having chest pain that is radiating. Recommend bilateral carotid duplex in one years' time.     Total appointment time: 20 minutes    Leticia Davis CNP

## 2024-02-15 NOTE — TELEPHONE ENCOUNTER
Future Appointments   Date Time Provider Department Center   2/21/2024 10:00 AM Melissa Robertson MD [facility] ED

## 2024-02-19 ENCOUNTER — LAB REQUISITION (OUTPATIENT)
Dept: LAB | Facility: CLINIC | Age: 69
End: 2024-02-19

## 2024-02-19 DIAGNOSIS — N17.9 ACUTE KIDNEY FAILURE, UNSPECIFIED (H): ICD-10-CM

## 2024-02-19 LAB
ANION GAP SERPL CALCULATED.3IONS-SCNC: 14 MMOL/L (ref 7–15)
ATRIAL RATE - MUSE: 62 BPM
ATRIAL RATE - MUSE: 74 BPM
BUN SERPL-MCNC: 23.6 MG/DL (ref 8–23)
CALCIUM SERPL-MCNC: 9.1 MG/DL (ref 8.8–10.2)
CHLORIDE SERPL-SCNC: 101 MMOL/L (ref 98–107)
CREAT SERPL-MCNC: 1.24 MG/DL (ref 0.67–1.17)
DEPRECATED HCO3 PLAS-SCNC: 21 MMOL/L (ref 22–29)
DIASTOLIC BLOOD PRESSURE - MUSE: 56 MMHG
DIASTOLIC BLOOD PRESSURE - MUSE: NORMAL MMHG
EGFRCR SERPLBLD CKD-EPI 2021: 64 ML/MIN/1.73M2
GLUCOSE SERPL-MCNC: 95 MG/DL (ref 70–99)
INTERPRETATION ECG - MUSE: NORMAL
INTERPRETATION ECG - MUSE: NORMAL
P AXIS - MUSE: 72 DEGREES
P AXIS - MUSE: 73 DEGREES
POTASSIUM SERPL-SCNC: 4.3 MMOL/L (ref 3.4–5.3)
PR INTERVAL - MUSE: 160 MS
PR INTERVAL - MUSE: 176 MS
QRS DURATION - MUSE: 66 MS
QRS DURATION - MUSE: 94 MS
QT - MUSE: 408 MS
QT - MUSE: 420 MS
QTC - MUSE: 426 MS
QTC - MUSE: 452 MS
R AXIS - MUSE: -37 DEGREES
R AXIS - MUSE: -5 DEGREES
SODIUM SERPL-SCNC: 136 MMOL/L (ref 135–145)
SYSTOLIC BLOOD PRESSURE - MUSE: 91 MMHG
SYSTOLIC BLOOD PRESSURE - MUSE: NORMAL MMHG
T AXIS - MUSE: 25 DEGREES
T AXIS - MUSE: 46 DEGREES
VENTRICULAR RATE- MUSE: 62 BPM
VENTRICULAR RATE- MUSE: 74 BPM

## 2024-02-19 PROCEDURE — 80048 BASIC METABOLIC PNL TOTAL CA: CPT | Performed by: PHYSICIAN ASSISTANT

## 2024-02-21 ENCOUNTER — OFFICE VISIT (OUTPATIENT)
Dept: OTHER | Facility: CLINIC | Age: 69
End: 2024-02-21
Attending: INTERNAL MEDICINE
Payer: COMMERCIAL

## 2024-02-21 VITALS
OXYGEN SATURATION: 98 % | HEART RATE: 69 BPM | BODY MASS INDEX: 27.99 KG/M2 | HEIGHT: 65 IN | WEIGHT: 168 LBS | DIASTOLIC BLOOD PRESSURE: 75 MMHG | SYSTOLIC BLOOD PRESSURE: 145 MMHG

## 2024-02-21 DIAGNOSIS — I10 BENIGN ESSENTIAL HYPERTENSION: ICD-10-CM

## 2024-02-21 DIAGNOSIS — E78.5 HYPERLIPIDEMIA LDL GOAL <70: Primary | ICD-10-CM

## 2024-02-21 DIAGNOSIS — Z98.890 S/P CAROTID ENDARTERECTOMY: ICD-10-CM

## 2024-02-21 DIAGNOSIS — R73.01 IFG (IMPAIRED FASTING GLUCOSE): ICD-10-CM

## 2024-02-21 DIAGNOSIS — N18.2 CKD (CHRONIC KIDNEY DISEASE) STAGE 2, GFR 60-89 ML/MIN: ICD-10-CM

## 2024-02-21 PROCEDURE — G2211 COMPLEX E/M VISIT ADD ON: HCPCS | Performed by: INTERNAL MEDICINE

## 2024-02-21 PROCEDURE — 99205 OFFICE O/P NEW HI 60 MIN: CPT | Performed by: INTERNAL MEDICINE

## 2024-02-21 PROCEDURE — G0463 HOSPITAL OUTPT CLINIC VISIT: HCPCS | Performed by: INTERNAL MEDICINE

## 2024-02-21 NOTE — PROGRESS NOTES
"[facility] Vascular Clinic        Patient is here for a consult to discuss hyperlipidermia    Pt is currently taking Aspirin and Statin.    BP (!) 145/75 (BP Location: Left arm, Patient Position: Chair, Cuff Size: Adult Regular)   Pulse 69   Ht 5' 5\" (1.651 m)   Wt 168 lb (76.2 kg)   SpO2 98%   BMI 27.96 kg/m      The provider has been notified that the patient has no concerns.     Questions patient would like addressed today are: N/A.    Refills are needed: N/A    Has homecare services and agency name:  Kelly Lucia MA            "

## 2024-02-21 NOTE — PROGRESS NOTES
[facility] VASCULAR [facility] CENTER INITIAL  HYPERLIPIDEMIA CONSULT    ( New patient visit)     PRIMARY HEALTH CARE PROVIDER:  Timbo Franco MD      REFERRING HEALTH CARE PROVIDER;  Leticia Davis NP    REASON FOR CONSULT:   Management of Hyperlipidemia      HPI: Rich Moffett is a 68 year old very pleasant male with multiple medical issues former smoker with known history of carotid disease status post successful left-sided carotid endarterectomy in June 2023, hypertension, CKD, IFG and hyperlipidemia he was taking Crestor 40 mg daily LDL is not at goal approximately a week ago initiated Zetia taking.  He also has a fluctuating and high blood pressure recently adjusted medications currently taking amlodipine 5 mg daily and also is taking 2 different doses of lisinopril, he takes lisinopril with hydrochlorothiazide 20/12. 5 in the morning and just the lisinopril 10 mg alone  In the evening, since then blood pressure is improved he is monitoring at home.  Was also seen in the ER recently for chest pain aortic survey was unremarkable and also he underwent echocardiogram and Lexiscan test which were unremarkable    No more chest pain .  He denies any claudication symptoms he is able to walk miles without any problems    Is new to me reviewed available records in the epic and updated chart      PAST MEDICAL HISTORY  Past Medical History:   Diagnosis Date    Carotid stenosis Left s/p CEA     HTN (hypertension)     Hyperlipidemia LDL goal <70        CURRENT MEDICATIONS  acetaminophen (TYLENOL) 325 MG tablet, Take 2 tablets (650 mg) by mouth every 4 hours as needed for mild pain  amLODIPine (NORVASC) 2.5 MG tablet, Take 1 tablet (2.5 mg) by mouth daily (Patient taking differently: Take 5 mg by mouth daily)  aspirin 81 MG EC tablet, Take 1 tablet (81 mg) by mouth daily  diclofenac (VOLTAREN) 1 % topical gel, Apply 2 g topically 4 times daily as needed for moderate pain  ezetimibe (ZETIA) 10 MG tablet, Take 1  tablet (10 mg) by mouth daily  lisinopril (ZESTRIL) 10 MG tablet, Take 10 mg by mouth every evening  lisinopril-hydrochlorothiazide (ZESTORETIC) 20-12.5 MG tablet, Take 1 tablet by mouth every morning  rosuvastatin (CRESTOR) 40 MG tablet, Take 1 tablet (40 mg) by mouth daily    No current facility-administered medications on file prior to visit.      PREVIOUSLY TRIED LIPID LOWERING  MEDICATIONS  Statin - Yes: Rosuvastatin    INTOLERANCE TO LIPID MEDICATIONS  No None    PAST SURGICAL HISTORY:  Past Surgical History:   Procedure Laterality Date    ENDARTERECTOMY CAROTID Left 6/23/2023    Procedure: LEFT CAROTID ENDARTERECTOMY WITH ELECTROENCEPHALOGRAM AND INTRAOPERATIVE ULTRASOUND;  Surgeon: Kimberli Macdonald MD;  Location: [facility] OR       ALLERGIES   No Known Allergies    FAMILY HISTORY  Family History   Problem Relation Age of Onset    Hypertension Sister        CARDIOVASCULAR RISK FACTORS  1. Diabetes: No  2. Smoking: quit smoking some time ago.  3. HTN: fair control  4.Family history of Familial Hyperlipidemia No or Familial Hypertriglyceridemia No  5.Metabolic syndrome No  6. [place] Risk Calculator:  N/A , secondary prevention   7. 2013 GUIDELINES AHA RISK: <7.5 or >7.5  8. Age: 68 year old  9. Postmenopausal N/A  10.Prediabetes Yes  11. BMI: Body mass index is 27.96 kg/m .  12.SEX: male  13. CKD: Yes    SOCIAL HISTORY  Social History     Socioeconomic History    Marital status:      Spouse name: Not on file    Number of children: Not on file    Years of education: Not on file    Highest education level: Not on file   Occupational History    Not on file   Tobacco Use    Smoking status: Former     Types: Cigarettes    Smokeless tobacco: Never   Substance and Sexual Activity    Alcohol use: Yes     Comment: occ    Drug use: Never    Sexual activity: Not on file   Other Topics Concern    Not on file   Social History Narrative    Not on file     Social Determinants of Health     Financial Resource  "Strain: Not on file   Food Insecurity: Not on file   Transportation Needs: Not on file   Physical Activity: Not on file   Stress: Not on file   Social Connections: Not on file   Interpersonal Safety: Not on file   Housing Stability: Not on file       ROS:   General: No change in weight, sleep or appetite.  Normal energy.  No fever or chills  Eyes: Negative for vision changes or eye problems  ENT: No problems with ears, nose or throat.  No difficulty swallowing.  Resp: No coughing, wheezing or shortness of breath  CV: No chest pains or palpitations  GI: No nausea, vomiting,  heartburn, abdominal pain, diarrhea, constipation or change in bowel habits  : No urinary frequency or dysuria, bladder or kidney problems  Musculoskeletal: No significant muscle or joint pains  Neurologic: No headaches, numbness, tingling, weakness, problems with balance or coordination  Psychiatric: No problems with anxiety, depression or mental health  Heme/immune/allergy: No history of bleeding or clotting problems or anemia.  No allergies or immune system problems  Endocrine: No history of thyroid disease, diabetes or other endocrine disorders  Skin: No rashes,worrisome lesions or skin problems  Vascular:  No claudication, lifestyle limiting or otherwise; no ischemic rest pain; no non-healing ulcers. No weakness, No loss of sensation      BASELINE MYALGIAS  No    EXAM:  BP (!) 145/75 (BP Location: Left arm, Patient Position: Chair, Cuff Size: Adult Regular)   Pulse 69   Ht 5' 5\" (1.651 m)   Wt 168 lb (76.2 kg)   SpO2 98%   BMI 27.96 kg/m    In general, the patient is a pleasant male in no apparent distress.    HEENT: NC/AT.  PERRLA.  EOMI.  Sclerae white, not injected.  Nares clear.  Pharynx without erythema or exudate.  Dentition intact.    Neck: No adenopathy.  No thyromegaly. Carotids +2/2 bilaterally without bruits.  No jugular venous distension.   Heart: RRR. Normal S1, S2 splits physiologically. No murmur, rub, click, or gallop. " "The PMI is in the 5th ICS in the midclavicular line. There is no heave.    Lungs: CTA.  No ronchi, wheezes, rales.  No dullness to percussion.   Abdomen: Soft, nontender, nondistended. No organomegaly. No AAA.  No bruits.   Extremities: No clubbing, cyanosis, or edema.  No wounds.     No Xanthelasma, No arcus senilis, No Tendon Xanthomas, No Eruptive Xanthomas, No palmar crease abnormalities      Labs:  LIPID RESULTS:  Lab Results   Component Value Date    CHOL 223 (H) 01/25/2024    CHOL 151 10/12/2023    CHOL 151 07/28/2023    HDL 43 01/25/2024    HDL 40 10/12/2023    HDL 38 (L) 07/28/2023     (H) 01/25/2024    LDL 92 10/12/2023    LDL 85 07/28/2023    TRIG 297 (H) 01/25/2024    TRIG 95 10/12/2023    TRIG 139 07/28/2023       ADVANCED LIPID TESTS: No    LIVER ENZYME RESULTS:  Lab Results   Component Value Date    AST 29 02/13/2024    ALT 36 02/13/2024       CBC RESULTS:  Lab Results   Component Value Date    WBC 6.0 02/13/2024    RBC 5.61 02/13/2024    HGB 15.8 02/13/2024    HCT 46.9 02/13/2024    MCV 84 02/13/2024    MCH 28.2 02/13/2024    MCHC 33.7 02/13/2024    RDW 13.2 02/13/2024     02/13/2024       BMP RESULTS:  Lab Results   Component Value Date     02/19/2024    POTASSIUM 4.3 02/19/2024    POTASSIUM 4.2 06/07/2022    CHLORIDE 101 02/19/2024    CHLORIDE 104 06/07/2022    CO2 21 (L) 02/19/2024    CO2 23 06/07/2022    ANIONGAP 14 02/19/2024    ANIONGAP 12 06/07/2022    GLC 95 02/19/2024     (H) 06/24/2023    GLC 86 06/07/2022    BUN 23.6 (H) 02/19/2024    BUN 19 06/07/2022    CR 1.24 (H) 02/19/2024    GFRESTIMATED 64 02/19/2024    JASMINE 9.1 02/19/2024        A1C RESULTS:  Lab Results   Component Value Date    A1C 5.9 (H) 02/13/2024       THYROID RESULTS:  Lab Results   Component Value Date    TSH 0.58 06/21/2023       CPK RESULTS:No    CARDIAC STUDIES: Yes      Procedures:     ECHO 2/13/2024  \"Interpretation Summary     The left ventricle is normal in size. There is normal left " "ventricular wall  thickness.  Hyperdynamic left ventricular function The visual ejection fraction is 65-70%.  No regional wall motion abnormalities noted.     The right ventricle is borderline dilated.  The right ventricular systolic function is normal.  Normal left atrial size.  Right atrial size is normal.  Mild (35-45mmHg) pulmonary hypertension is present.  IVC diameter <2.1 cm collapsing >50% with sniff suggests a normal RA pressure  of 3 mmHg.\"    Lexiscan normal 2/13/2024       Assessment and Plan:     1. Hyperlipidemia LDL goal <70  - LipoFit by NMR; Future  - Lipoprotein (a); Future  - Apolipoprotein B; Future  - Comprehensive metabolic panel; Future    2. S/P carotid endarterectomy, Left, 6/23/2023    3. Benign essential hypertension  - Comprehensive metabolic panel; Future    4. CKD (chronic kidney disease) stage 2, GFR 60-89 ml/min    5. IFG (impaired fasting glucose)    This is a very pleasant 68-year-old male with multiple atherosclerotic risk factors status post carotid endarterectomy lipids were not at goal with Crestor 40 mg daily proximately a week ago initiated Zetia 10 mg daily tolerating and his blood pressure was not under control but his medications were recently adjusted and since then blood pressure is better.  He is taking aspirin.  Recent history of chest pain workup unremarkable  Goal of LDL in this patient is less than 70  Since he was just initiated Zetia on top of Crestor 40 mg daily continue the same therapeutic lifestyle modifications discussed with the patient    I will get NMR, lipoprotein a, apolipoprotein B, CMP lab tests approximately in 6 weeks then followed by a week later office visit and at that time I will decide adjusting meds or appropriate management.    Monitor blood pressure goal is less than 130/80    Avoid ibuprofen, Aleve, Advil type of medications  Go for surveillance carotid ultrasound ordered by vascular surgery service as planned    Follow-up with primary care " physician for all other issues     AVS with written instructions given    Thank you for the consultation !  This note was dictated by utilizing dragon software    Copy of this note to primary care physician and referring provider    60 minutes spent on the date of the encounter doing chart review, review of recent imaging studies, previous evaluation, history, exam, documentation and addressed above-mentioned multiple issues    The longitudinal care of plan for the above diagnoses was addressed during this visit. Due to added complexity of care, we will continue to supprt Rich Moffett and the subsequent management of this/these conditions and with ongoing continuity of care for this/these conditions.      Melissa Robertson MD,FAHA,FSVM,FNLA, FACP  Vascular Medicine  Clinical Hypertension Specialist   Clinical Lipidologist

## 2024-02-21 NOTE — PATIENT INSTRUCTIONS
Since recently adjusted blood pressure medications and cholesterol medications we will repeat fasting labs in 6 weeks then decide further adjusting the medications    Continue to monitor blood pressure    Walk as much as you can walk    Follow-up with me 1 week after labs completed

## 2024-02-22 ENCOUNTER — TELEPHONE (OUTPATIENT)
Dept: OTHER | Facility: CLINIC | Age: 69
End: 2024-02-22
Payer: COMMERCIAL

## 2024-02-22 NOTE — TELEPHONE ENCOUNTER
Routing to scheduling to arrange for:    Follow-up to 2/21/24    Fasting labs in six weeks (First week of April)  Follow up one week later.  In clinic.

## 2024-02-22 NOTE — TELEPHONE ENCOUNTER
Future Appointments   Date Time Provider Department Center   4/2/2024 10:00 AM [facility]   4/9/2024 10:00 AM Melissa Robertson MD [facility]     FYI above scheduled. Please update dating in lab panels and thank you.

## 2024-04-02 ENCOUNTER — LAB (OUTPATIENT)
Dept: LAB | Facility: CLINIC | Age: 69
End: 2024-04-02
Payer: COMMERCIAL

## 2024-04-02 DIAGNOSIS — E78.5 HYPERLIPIDEMIA LDL GOAL <70: ICD-10-CM

## 2024-04-02 DIAGNOSIS — I10 BENIGN ESSENTIAL HYPERTENSION: ICD-10-CM

## 2024-04-02 LAB
ALBUMIN SERPL BCG-MCNC: 4.5 G/DL (ref 3.5–5.2)
ALP SERPL-CCNC: 49 U/L (ref 40–150)
ALT SERPL W P-5'-P-CCNC: 42 U/L (ref 0–70)
ANION GAP SERPL CALCULATED.3IONS-SCNC: 14 MMOL/L (ref 7–15)
APO A-I SERPL-MCNC: 10 MG/DL
AST SERPL W P-5'-P-CCNC: 38 U/L (ref 0–45)
BILIRUB SERPL-MCNC: 1.1 MG/DL
BUN SERPL-MCNC: 15.7 MG/DL (ref 8–23)
CALCIUM SERPL-MCNC: 9.2 MG/DL (ref 8.8–10.2)
CHLORIDE SERPL-SCNC: 101 MMOL/L (ref 98–107)
CREAT SERPL-MCNC: 1.23 MG/DL (ref 0.67–1.17)
DEPRECATED HCO3 PLAS-SCNC: 24 MMOL/L (ref 22–29)
EGFRCR SERPLBLD CKD-EPI 2021: 64 ML/MIN/1.73M2
GLUCOSE SERPL-MCNC: 95 MG/DL (ref 70–99)
POTASSIUM SERPL-SCNC: 4.2 MMOL/L (ref 3.4–5.3)
PROT SERPL-MCNC: 7.1 G/DL (ref 6.4–8.3)
SODIUM SERPL-SCNC: 139 MMOL/L (ref 135–145)

## 2024-04-02 PROCEDURE — 99000 SPECIMEN HANDLING OFFICE-LAB: CPT

## 2024-04-02 PROCEDURE — 80053 COMPREHEN METABOLIC PANEL: CPT

## 2024-04-02 PROCEDURE — 83704 LIPOPROTEIN BLD QUAN PART: CPT | Mod: 90

## 2024-04-02 PROCEDURE — 83695 ASSAY OF LIPOPROTEIN(A): CPT

## 2024-04-02 PROCEDURE — 36415 COLL VENOUS BLD VENIPUNCTURE: CPT

## 2024-04-02 PROCEDURE — 82172 ASSAY OF APOLIPOPROTEIN: CPT | Mod: 90

## 2024-04-02 PROCEDURE — 80061 LIPID PANEL: CPT | Mod: 90

## 2024-04-04 LAB — APO B100 SERPL-MCNC: 59 MG/DL

## 2024-04-05 LAB
CHOLEST SERPL-MCNC: 99 MG/DL
HDL SERPL QN: 8.9 NM
HDL SERPL-SCNC: 25.7 UMOL/L
HDLC SERPL-MCNC: 42 MG/DL
HLD.LARGE SERPL-SCNC: 4 UMOL/L
LDL SERPL QN: 20.7 NM
LDL SERPL-SCNC: 671 NMOL/L
LDL SMALL SERPL-SCNC: 373 NMOL/L
LDLC SERPL CALC-MCNC: 35 MG/DL
PATHOLOGY STUDY: ABNORMAL
TRIGL SERPL-MCNC: 112 MG/DL
VLDL LARGE SERPL-SCNC: 2.4 NMOL/L
VLDL SERPL QN: 49 NM

## 2024-04-09 ENCOUNTER — OFFICE VISIT (OUTPATIENT)
Dept: OTHER | Facility: CLINIC | Age: 69
End: 2024-04-09
Attending: INTERNAL MEDICINE
Payer: COMMERCIAL

## 2024-04-09 VITALS
DIASTOLIC BLOOD PRESSURE: 84 MMHG | BODY MASS INDEX: 28.19 KG/M2 | OXYGEN SATURATION: 98 % | WEIGHT: 169.4 LBS | SYSTOLIC BLOOD PRESSURE: 148 MMHG | HEART RATE: 62 BPM

## 2024-04-09 DIAGNOSIS — N18.2 CKD (CHRONIC KIDNEY DISEASE) STAGE 2, GFR 60-89 ML/MIN: ICD-10-CM

## 2024-04-09 DIAGNOSIS — Z98.890 S/P CAROTID ENDARTERECTOMY: ICD-10-CM

## 2024-04-09 DIAGNOSIS — R73.01 IFG (IMPAIRED FASTING GLUCOSE): ICD-10-CM

## 2024-04-09 DIAGNOSIS — E78.5 HYPERLIPIDEMIA LDL GOAL <70: Primary | ICD-10-CM

## 2024-04-09 DIAGNOSIS — I10 BENIGN ESSENTIAL HYPERTENSION: ICD-10-CM

## 2024-04-09 PROCEDURE — 99215 OFFICE O/P EST HI 40 MIN: CPT | Performed by: INTERNAL MEDICINE

## 2024-04-09 PROCEDURE — G0463 HOSPITAL OUTPT CLINIC VISIT: HCPCS | Performed by: INTERNAL MEDICINE

## 2024-04-09 PROCEDURE — G2211 COMPLEX E/M VISIT ADD ON: HCPCS | Performed by: INTERNAL MEDICINE

## 2024-04-09 RX ORDER — AMLODIPINE BESYLATE 5 MG/1
5 TABLET ORAL DAILY
Qty: 30 TABLET | Refills: 11 | Status: SHIPPED | OUTPATIENT
Start: 2024-04-09

## 2024-04-09 RX ORDER — LISINOPRIL AND HYDROCHLOROTHIAZIDE 12.5; 2 MG/1; MG/1
1 TABLET ORAL EVERY MORNING
Qty: 30 TABLET | Refills: 11 | Status: SHIPPED | OUTPATIENT
Start: 2024-04-09

## 2024-04-09 RX ORDER — ROSUVASTATIN CALCIUM 40 MG/1
40 TABLET, COATED ORAL DAILY
Qty: 90 TABLET | Refills: 3 | Status: SHIPPED | OUTPATIENT
Start: 2024-04-09

## 2024-04-09 NOTE — PROGRESS NOTES
[facility] VASCULAR [facility]ALTH CENTER FOLLOW UP VIIST      PRIMARY HEALTH CARE PROVIDER:  Timbo Franco MD      REASON FOR CONSULT:   Management of Hyperlipidemia, Known carotid disease s/p Left CEA, HTN, med refills etc       HPI: Rich Moffett is a 68 year old very pleasant male with multiple medical issues former smoker with known history of carotid disease status post successful left-sided carotid endarterectomy in June 2023, hypertension, CKD, IFG and hyperlipidemia he was taking Crestor 40 mg daily LDL is not at goal approximately a week ago initiated Zetia taking.  He also has a fluctuating and high blood pressure recently adjusted medications currently taking amlodipine 5 mg daily and also is taking 2 different doses of lisinopril, he takes lisinopril with hydrochlorothiazide 20/12. 5 in the morning and just the lisinopril 10 mg alone  In the evening, since then blood pressure is improved he is monitoring at home.  Was also seen in the ER recently for chest pain aortic survey was unremarkable and also he underwent echocardiogram and Lexiscan test which were unremarkable    No more chest pain .  He denies any claudication symptoms he is able to walk miles without any problems    Reviewed recent labs , excellent lipids       PAST MEDICAL HISTORY  Past Medical History:   Diagnosis Date    Carotid stenosis Left s/p CEA     HTN (hypertension)     Hyperlipidemia LDL goal <70        CURRENT MEDICATIONS  Current Outpatient Medications   Medication Sig Dispense Refill    acetaminophen (TYLENOL) 325 MG tablet Take 2 tablets (650 mg) by mouth every 4 hours as needed for mild pain      amLODIPine (NORVASC) 5 MG tablet Take 1 tablet (5 mg) by mouth daily 30 tablet 11    aspirin 81 MG EC tablet Take 1 tablet (81 mg) by mouth daily 120 tablet 3    diclofenac (VOLTAREN) 1 % topical gel Apply 2 g topically 4 times daily as needed for moderate pain      ezetimibe (ZETIA) 10 MG tablet Take 1 tablet (10 mg) by mouth  daily 90 tablet 1    lisinopril-hydrochlorothiazide (ZESTORETIC) 20-12.5 MG tablet Take 1 tablet by mouth every morning 30 tablet 11    rosuvastatin (CRESTOR) 40 MG tablet Take 1 tablet (40 mg) by mouth daily 90 tablet 3     No current facility-administered medications for this visit.       PREVIOUSLY TRIED LIPID LOWERING  MEDICATIONS  Statin - Yes: Rosuvastatin    INTOLERANCE TO LIPID MEDICATIONS  No None    PAST SURGICAL HISTORY:  Past Surgical History:   Procedure Laterality Date    ENDARTERECTOMY CAROTID Left 6/23/2023    Procedure: LEFT CAROTID ENDARTERECTOMY WITH ELECTROENCEPHALOGRAM AND INTRAOPERATIVE ULTRASOUND;  Surgeon: Kimberli Macdonald MD;  Location: [facility] OR       ALLERGIES   No Known Allergies    FAMILY HISTORY  Family History   Problem Relation Age of Onset    Hypertension Sister        CARDIOVASCULAR RISK FACTORS  1. Diabetes: No  2. Smoking: quit smoking some time ago.  3. HTN: fair control  4.Family history of Familial Hyperlipidemia No or Familial Hypertriglyceridemia No  5.Metabolic syndrome No  6. [place] Risk Calculator:  N/A , secondary prevention   7. 2013 GUIDELINES AHA RISK: <7.5 or >7.5  8. Age: 68 year old  9. Postmenopausal N/A  10.Prediabetes Yes  11. BMI: Body mass index is 28.19 kg/m .  12.SEX: male  13. CKD: Yes    SOCIAL HISTORY  Social History     Socioeconomic History    Marital status:      Spouse name: Not on file    Number of children: Not on file    Years of education: Not on file    Highest education level: Not on file   Occupational History    Not on file   Tobacco Use    Smoking status: Former     Types: Cigarettes    Smokeless tobacco: Never   Substance and Sexual Activity    Alcohol use: Yes     Comment: occ    Drug use: Never    Sexual activity: Not on file   Other Topics Concern    Not on file   Social History Narrative    Not on file     Social Determinants of Health     Financial Resource Strain: Not on file   Food Insecurity: Not on file    Transportation Needs: Not on file   Physical Activity: Not on file   Stress: Not on file   Social Connections: Not on file   Interpersonal Safety: Not on file   Housing Stability: Not on file       ROS:   General: No change in weight, sleep or appetite.  Normal energy.  No fever or chills  Eyes: Negative for vision changes or eye problems  ENT: No problems with ears, nose or throat.  No difficulty swallowing.  Resp: No coughing, wheezing or shortness of breath  CV: No chest pains or palpitations  GI: No nausea, vomiting,  heartburn, abdominal pain, diarrhea, constipation or change in bowel habits  : No urinary frequency or dysuria, bladder or kidney problems  Musculoskeletal: No significant muscle or joint pains  Neurologic: No headaches, numbness, tingling, weakness, problems with balance or coordination  Psychiatric: No problems with anxiety, depression or mental health  Heme/immune/allergy: No history of bleeding or clotting problems or anemia.  No allergies or immune system problems  Endocrine: No history of thyroid disease, diabetes or other endocrine disorders  Skin: No rashes,worrisome lesions or skin problems  Vascular:  No claudication, lifestyle limiting or otherwise; no ischemic rest pain; no non-healing ulcers. No weakness, No loss of sensation      BASELINE MYALGIAS  No    EXAM:  BP (!) 148/84 (BP Location: Right arm, Patient Position: Chair, Cuff Size: Adult Regular)   Pulse 62   Wt 169 lb 6.4 oz (76.8 kg)   SpO2 98%   BMI 28.19 kg/m    In general, the patient is a pleasant male in no apparent distress.    HEENT: NC/AT.  PERRLA.  EOMI.  Sclerae white, not injected.  Nares clear.  Pharynx without erythema or exudate.  Dentition intact.    Neck: No adenopathy.  No thyromegaly. Carotids +2/2 bilaterally without bruits.  No jugular venous distension.   Heart: RRR. Normal S1, S2 splits physiologically. No murmur, rub, click, or gallop. The PMI is in the 5th ICS in the midclavicular line. There is no  "heave.    Lungs: CTA.  No ronchi, wheezes, rales.  No dullness to percussion.   Abdomen: Soft, nontender, nondistended. No organomegaly. No AAA.  No bruits.   Extremities: No clubbing, cyanosis, or edema.  No wounds.     No Xanthelasma, No arcus senilis, No Tendon Xanthomas, No Eruptive Xanthomas, No palmar crease abnormalities      Labs:  LIPID RESULTS:  Lab Results   Component Value Date    CHOL 223 (H) 01/25/2024    CHOL 151 10/12/2023    CHOL 151 07/28/2023    HDL 43 01/25/2024    HDL 40 10/12/2023    HDL 38 (L) 07/28/2023     (H) 01/25/2024    LDL 92 10/12/2023    LDL 85 07/28/2023    TRIG 297 (H) 01/25/2024    TRIG 95 10/12/2023    TRIG 139 07/28/2023       ADVANCED LIPID TESTS: No    LIVER ENZYME RESULTS:  Lab Results   Component Value Date    AST 38 04/02/2024    ALT 42 04/02/2024       CBC RESULTS:  Lab Results   Component Value Date    WBC 6.0 02/13/2024    RBC 5.61 02/13/2024    HGB 15.8 02/13/2024    HCT 46.9 02/13/2024    MCV 84 02/13/2024    MCH 28.2 02/13/2024    MCHC 33.7 02/13/2024    RDW 13.2 02/13/2024     02/13/2024       BMP RESULTS:  Lab Results   Component Value Date     04/02/2024    POTASSIUM 4.2 04/02/2024    POTASSIUM 4.2 06/07/2022    CHLORIDE 101 04/02/2024    CHLORIDE 104 06/07/2022    CO2 24 04/02/2024    CO2 23 06/07/2022    ANIONGAP 14 04/02/2024    ANIONGAP 12 06/07/2022    GLC 95 04/02/2024     (H) 06/24/2023    GLC 86 06/07/2022    BUN 15.7 04/02/2024    BUN 19 06/07/2022    CR 1.23 (H) 04/02/2024    GFRESTIMATED 64 04/02/2024    JASMINE 9.2 04/02/2024        A1C RESULTS:  Lab Results   Component Value Date    A1C 5.9 (H) 02/13/2024       THYROID RESULTS:  Lab Results   Component Value Date    TSH 0.58 06/21/2023       CPK RESULTS:No    CARDIAC STUDIES: Yes      Procedures:     ECHO 2/13/2024  \"Interpretation Summary     The left ventricle is normal in size. There is normal left ventricular wall  thickness.  Hyperdynamic left ventricular function The visual " "ejection fraction is 65-70%.  No regional wall motion abnormalities noted.     The right ventricle is borderline dilated.  The right ventricular systolic function is normal.  Normal left atrial size.  Right atrial size is normal.  Mild (35-45mmHg) pulmonary hypertension is present.  IVC diameter <2.1 cm collapsing >50% with sniff suggests a normal RA pressure  of 3 mmHg.\"    Lexiscan normal 2/13/2024       Assessment and Plan:     1. Hyperlipidemia LDL goal <70    2. S/P carotid endarterectomy, Left, 6/23/2023    3. Benign essential hypertension  - Comprehensive metabolic panel; Future    4. CKD (chronic kidney disease) stage 2, GFR 60-89 ml/min    5. IFG (impaired fasting glucose)    This is a very pleasant 68-year-old male with multiple atherosclerotic risk factors status post carotid endarterectomy lipids were not at goal with Crestor 40 mg daily, few weeks ago  initiated Zetia 10 mg daily tolerating and his blood pressure was not under control but his medications were recently adjusted and since then blood pressure is better.  He is taking aspirin.  Recent history of chest pain workup unremarkable  Goal of LDL in this patient is less than 70      Reviewed recent lab results with patient   Monitor blood pressure goal is less than 130/80  Refilled medications     Avoid ibuprofen, Aleve, Advil type of medications  Go for surveillance carotid ultrasound in one year     Follow-up with primary care physician for all other issues     AVS with written instructions given  Follow up in 6 months after fasting labs   Repeat carotid US ion one year     This note was dictated by utilizing dragon software    Copy of this note to primary care physician     40 minutes spent on the date of the encounter doing chart review, review of recent imaging studies, previous evaluation, history, exam, documentation and addressed above-mentioned multiple issues    The longitudinal care of plan for the above diagnoses was addressed during " this visit. Due to added complexity of care, we will continue to supprt Rich Moffett and the subsequent management of this/these conditions and with ongoing continuity of care for this/these conditions.      Melissa Robertson MD,ROSSI,FSVM,FNLA, FACP  Vascular Medicine  Clinical Hypertension Specialist   Clinical Lipidologist

## 2024-04-09 NOTE — PATIENT INSTRUCTIONS
Recent Labs looks good     Continue current medications , refilled medications     Monitor BP at home     Follow up in 6 months after  fasting labs     Repeat carotid US in one year

## 2024-04-09 NOTE — PROGRESS NOTES
[facility]        Patient is here for a  follow up.    Pt is currently taking Aspirin and Statin.    BP (!) 148/84 (BP Location: Right arm, Patient Position: Chair, Cuff Size: Adult Regular)   Pulse 62   Wt 169 lb 6.4 oz (76.8 kg)   SpO2 98%   BMI 28.19 kg/m      The provider has been notified that the patient has no concerns.     Questions patient would like addressed today are: N/A.    Refills are needed: N/A    Has homecare services and agency name:  Kelly Lucia MA             none

## 2024-06-17 ENCOUNTER — LAB REQUISITION (OUTPATIENT)
Dept: LAB | Facility: CLINIC | Age: 69
End: 2024-06-17

## 2024-06-17 DIAGNOSIS — R30.0 DYSURIA: ICD-10-CM

## 2024-06-17 PROCEDURE — 87086 URINE CULTURE/COLONY COUNT: CPT | Performed by: STUDENT IN AN ORGANIZED HEALTH CARE EDUCATION/TRAINING PROGRAM

## 2024-06-19 LAB — BACTERIA UR CULT: NO GROWTH

## 2024-07-20 ENCOUNTER — HEALTH MAINTENANCE LETTER (OUTPATIENT)
Age: 69
End: 2024-07-20

## 2024-09-09 ENCOUNTER — LAB REQUISITION (OUTPATIENT)
Dept: LAB | Facility: CLINIC | Age: 69
End: 2024-09-09

## 2024-09-09 DIAGNOSIS — Z00.00 ENCOUNTER FOR GENERAL ADULT MEDICAL EXAMINATION WITHOUT ABNORMAL FINDINGS: ICD-10-CM

## 2024-09-09 DIAGNOSIS — I10 ESSENTIAL (PRIMARY) HYPERTENSION: ICD-10-CM

## 2024-09-09 LAB
ANION GAP SERPL CALCULATED.3IONS-SCNC: 14 MMOL/L (ref 7–15)
BUN SERPL-MCNC: 12.1 MG/DL (ref 8–23)
CALCIUM SERPL-MCNC: 9.7 MG/DL (ref 8.8–10.4)
CHLORIDE SERPL-SCNC: 98 MMOL/L (ref 98–107)
CHOLEST SERPL-MCNC: 116 MG/DL
CREAT SERPL-MCNC: 1.09 MG/DL (ref 0.67–1.17)
EGFRCR SERPLBLD CKD-EPI 2021: 74 ML/MIN/1.73M2
FASTING STATUS PATIENT QL REPORTED: NORMAL
FASTING STATUS PATIENT QL REPORTED: NORMAL
GLUCOSE SERPL-MCNC: 98 MG/DL (ref 70–99)
HCO3 SERPL-SCNC: 26 MMOL/L (ref 22–29)
HDLC SERPL-MCNC: 41 MG/DL
LDLC SERPL CALC-MCNC: 53 MG/DL
NONHDLC SERPL-MCNC: 75 MG/DL
POTASSIUM SERPL-SCNC: 4.8 MMOL/L (ref 3.4–5.3)
PSA SERPL DL<=0.01 NG/ML-MCNC: 0.87 NG/ML (ref 0–4.5)
SODIUM SERPL-SCNC: 138 MMOL/L (ref 135–145)
TRIGL SERPL-MCNC: 112 MG/DL

## 2024-09-09 PROCEDURE — 80048 BASIC METABOLIC PNL TOTAL CA: CPT | Performed by: FAMILY MEDICINE

## 2024-09-09 PROCEDURE — 80061 LIPID PANEL: CPT | Performed by: FAMILY MEDICINE

## 2024-09-09 PROCEDURE — G0103 PSA SCREENING: HCPCS | Performed by: FAMILY MEDICINE

## 2024-09-24 ENCOUNTER — LAB (OUTPATIENT)
Dept: LAB | Facility: CLINIC | Age: 69
End: 2024-09-24
Payer: COMMERCIAL

## 2024-09-24 DIAGNOSIS — R73.01 IFG (IMPAIRED FASTING GLUCOSE): ICD-10-CM

## 2024-09-24 DIAGNOSIS — E78.5 HYPERLIPIDEMIA LDL GOAL <70: ICD-10-CM

## 2024-09-24 LAB
ALBUMIN SERPL BCG-MCNC: 4.7 G/DL (ref 3.5–5.2)
ALP SERPL-CCNC: 52 U/L (ref 40–150)
ALT SERPL W P-5'-P-CCNC: 35 U/L (ref 0–70)
ANION GAP SERPL CALCULATED.3IONS-SCNC: 11 MMOL/L (ref 7–15)
AST SERPL W P-5'-P-CCNC: 32 U/L (ref 0–45)
BILIRUB SERPL-MCNC: 1.3 MG/DL
BUN SERPL-MCNC: 16.5 MG/DL (ref 8–23)
CALCIUM SERPL-MCNC: 9.7 MG/DL (ref 8.8–10.4)
CHLORIDE SERPL-SCNC: 104 MMOL/L (ref 98–107)
CHOLEST SERPL-MCNC: 120 MG/DL
CREAT SERPL-MCNC: 1.07 MG/DL (ref 0.67–1.17)
EGFRCR SERPLBLD CKD-EPI 2021: 76 ML/MIN/1.73M2
EST. AVERAGE GLUCOSE BLD GHB EST-MCNC: 126 MG/DL
FASTING STATUS PATIENT QL REPORTED: YES
FASTING STATUS PATIENT QL REPORTED: YES
GLUCOSE SERPL-MCNC: 108 MG/DL (ref 70–99)
HBA1C MFR BLD: 6 % (ref 0–5.6)
HCO3 SERPL-SCNC: 28 MMOL/L (ref 22–29)
HDLC SERPL-MCNC: 44 MG/DL
LDLC SERPL CALC-MCNC: 54 MG/DL
NONHDLC SERPL-MCNC: 76 MG/DL
POTASSIUM SERPL-SCNC: 4.3 MMOL/L (ref 3.4–5.3)
PROT SERPL-MCNC: 7.4 G/DL (ref 6.4–8.3)
SODIUM SERPL-SCNC: 143 MMOL/L (ref 135–145)
TRIGL SERPL-MCNC: 111 MG/DL

## 2024-09-24 PROCEDURE — 36415 COLL VENOUS BLD VENIPUNCTURE: CPT

## 2024-09-24 PROCEDURE — 83036 HEMOGLOBIN GLYCOSYLATED A1C: CPT

## 2024-09-24 PROCEDURE — 80061 LIPID PANEL: CPT

## 2024-09-24 PROCEDURE — 80053 COMPREHEN METABOLIC PANEL: CPT

## 2024-10-02 ENCOUNTER — OFFICE VISIT (OUTPATIENT)
Dept: OTHER | Facility: CLINIC | Age: 69
End: 2024-10-02
Attending: INTERNAL MEDICINE
Payer: COMMERCIAL

## 2024-10-02 VITALS
DIASTOLIC BLOOD PRESSURE: 83 MMHG | BODY MASS INDEX: 26.96 KG/M2 | WEIGHT: 162 LBS | OXYGEN SATURATION: 97 % | HEART RATE: 63 BPM | SYSTOLIC BLOOD PRESSURE: 128 MMHG

## 2024-10-02 DIAGNOSIS — N18.2 CKD (CHRONIC KIDNEY DISEASE) STAGE 2, GFR 60-89 ML/MIN: ICD-10-CM

## 2024-10-02 DIAGNOSIS — I10 BENIGN ESSENTIAL HYPERTENSION: ICD-10-CM

## 2024-10-02 DIAGNOSIS — R73.01 IFG (IMPAIRED FASTING GLUCOSE): ICD-10-CM

## 2024-10-02 DIAGNOSIS — Z98.890 S/P CAROTID ENDARTERECTOMY: ICD-10-CM

## 2024-10-02 DIAGNOSIS — E78.5 HYPERLIPIDEMIA LDL GOAL <70: Primary | ICD-10-CM

## 2024-10-02 PROCEDURE — G0463 HOSPITAL OUTPT CLINIC VISIT: HCPCS | Performed by: INTERNAL MEDICINE

## 2024-10-02 PROCEDURE — 99215 OFFICE O/P EST HI 40 MIN: CPT | Performed by: INTERNAL MEDICINE

## 2024-10-02 PROCEDURE — G2211 COMPLEX E/M VISIT ADD ON: HCPCS | Performed by: INTERNAL MEDICINE

## 2024-10-02 RX ORDER — PREDNISONE 20 MG/1
20 TABLET ORAL DAILY
COMMUNITY
Start: 2024-09-19

## 2024-10-02 NOTE — PROGRESS NOTES
[facility]        Patient is here for a follow up.    Pt is currently taking Aspirin and Statin.    /83 (BP Location: Right arm, Patient Position: Sitting, Cuff Size: Adult Regular)   Pulse 63   Wt 162 lb (73.5 kg)   SpO2 97%   BMI 26.96 kg/m      The provider has been notified that the patient has no concerns.     Questions patient would like addressed today are: N/A.    Refills are needed: No    Has homecare services and agency name:  Kelly Kaur MA

## 2024-10-02 NOTE — PROGRESS NOTES
[facility] FOLLOW UP VIIST      PRIMARY HEALTH CARE PROVIDER:  Timbo Franco MD      REASON FOR CONSULT:   Management of Hyperlipidemia, Known carotid disease s/p Left CEA, HTN, med refills etc       HPI: Rich Moffett is a 68 year old very pleasant male with multiple medical issues former smoker with known history of carotid disease status post successful left-sided carotid endarterectomy in June 2023, hypertension, CKD, IFG and hyperlipidemia he was taking Crestor 40 mg daily LDL is not at goal approximately a week ago initiated Zetia taking.  He also has a fluctuating and high blood pressure recently adjusted medications currently taking amlodipine 5 mg daily and also is taking 2 different doses of lisinopril, he takes lisinopril with hydrochlorothiazide 20/12. 5 in the morning and just the lisinopril 10 mg alone  In the evening, since then blood pressure is improved he is monitoring at home.  Was also seen in the ER recently for chest pain aortic survey was unremarkable and also he underwent echocardiogram and Lexiscan test which were unremarkable    No more chest pain .  He denies any claudication symptoms he is able to walk miles without any problems    Reviewed recent labs , excellent lipids       PAST MEDICAL HISTORY  Past Medical History:   Diagnosis Date    Carotid stenosis Left s/p CEA     HTN (hypertension)     Hyperlipidemia LDL goal <70        CURRENT MEDICATIONS  Current Outpatient Medications   Medication Sig Dispense Refill    acetaminophen (TYLENOL) 325 MG tablet Take 2 tablets (650 mg) by mouth every 4 hours as needed for mild pain      amLODIPine (NORVASC) 5 MG tablet Take 1 tablet (5 mg) by mouth daily 30 tablet 11    aspirin 81 MG EC tablet Take 1 tablet (81 mg) by mouth daily 120 tablet 3    diclofenac (VOLTAREN) 1 % topical gel Apply 2 g topically 4 times daily as needed for moderate pain      lisinopril-hydrochlorothiazide (ZESTORETIC) 20-12.5 MG tablet  Take 1 tablet by mouth every morning 30 tablet 11    predniSONE (DELTASONE) 20 MG tablet Take 20 mg by mouth daily.      rosuvastatin (CRESTOR) 40 MG tablet Take 1 tablet (40 mg) by mouth daily 90 tablet 3     No current facility-administered medications for this visit.       PREVIOUSLY TRIED LIPID LOWERING  MEDICATIONS  Statin - Yes: Rosuvastatin    INTOLERANCE TO LIPID MEDICATIONS  No None    PAST SURGICAL HISTORY:  Past Surgical History:   Procedure Laterality Date    ENDARTERECTOMY CAROTID Left 6/23/2023    Procedure: LEFT CAROTID ENDARTERECTOMY WITH ELECTROENCEPHALOGRAM AND INTRAOPERATIVE ULTRASOUND;  Surgeon: Kimberli Macdonald MD;  Location: [facility] OR       ALLERGIES   No Known Allergies    FAMILY HISTORY  Family History   Problem Relation Age of Onset    Hypertension Sister        CARDIOVASCULAR RISK FACTORS  1. Diabetes: No  2. Smoking: quit smoking some time ago.  3. HTN: fair control  4.Family history of Familial Hyperlipidemia No or Familial Hypertriglyceridemia No  5.Metabolic syndrome No  6. [place] Risk Calculator:  N/A , secondary prevention   7. 2013 GUIDELINES AHA RISK: <7.5 or >7.5  8. Age: 68 year old  9. Postmenopausal N/A  10.Prediabetes Yes  11. BMI: Body mass index is 26.96 kg/m .  12.SEX: male  13. CKD: Yes    SOCIAL HISTORY  Social History     Socioeconomic History    Marital status:      Spouse name: Not on file    Number of children: Not on file    Years of education: Not on file    Highest education level: Not on file   Occupational History    Not on file   Tobacco Use    Smoking status: Former     Types: Cigarettes    Smokeless tobacco: Never   Substance and Sexual Activity    Alcohol use: Yes     Comment: occ    Drug use: Never    Sexual activity: Not on file   Other Topics Concern    Not on file   Social History Narrative    Not on file     Social Determinants of Health     Financial Resource Strain: Not on file   Food Insecurity: Not on file   Transportation Needs: Not  on file   Physical Activity: Not on file   Stress: Not on file   Social Connections: Not on file   Interpersonal Safety: Not on file   Housing Stability: Not on file       ROS:   General: No change in weight, sleep or appetite.  Normal energy.  No fever or chills  Eyes: Negative for vision changes or eye problems  ENT: No problems with ears, nose or throat.  No difficulty swallowing.  Resp: No coughing, wheezing or shortness of breath  CV: No chest pains or palpitations  GI: No nausea, vomiting,  heartburn, abdominal pain, diarrhea, constipation or change in bowel habits  : No urinary frequency or dysuria, bladder or kidney problems  Musculoskeletal: No significant muscle or joint pains  Neurologic: No headaches, numbness, tingling, weakness, problems with balance or coordination  Psychiatric: No problems with anxiety, depression or mental health  Heme/immune/allergy: No history of bleeding or clotting problems or anemia.  No allergies or immune system problems  Endocrine: No history of thyroid disease, diabetes or other endocrine disorders  Skin: No rashes,worrisome lesions or skin problems  Vascular:  No claudication, lifestyle limiting or otherwise; no ischemic rest pain; no non-healing ulcers. No weakness, No loss of sensation      BASELINE MYALGIAS  No    EXAM:  /83 (BP Location: Right arm, Patient Position: Sitting, Cuff Size: Adult Regular)   Pulse 63   Wt 162 lb (73.5 kg)   SpO2 97%   BMI 26.96 kg/m    In general, the patient is a pleasant male in no apparent distress.    HEENT: NC/AT.  PERRLA.  EOMI.  Sclerae white, not injected.  Nares clear.  Pharynx without erythema or exudate.  Dentition intact.    Neck: No adenopathy.  No thyromegaly. Carotids +2/2 bilaterally without bruits.  No jugular venous distension.   Heart: RRR. Normal S1, S2 splits physiologically. No murmur, rub, click, or gallop. The PMI is in the 5th ICS in the midclavicular line. There is no heave.    Lungs: CTA.  No ronchi,  "wheezes, rales.  No dullness to percussion.   Abdomen: Soft, nontender, nondistended. No organomegaly. No AAA.  No bruits.   Extremities: No clubbing, cyanosis, or edema.  No wounds.     No Xanthelasma, No arcus senilis, No Tendon Xanthomas, No Eruptive Xanthomas, No palmar crease abnormalities      Labs:  LIPID RESULTS:  Lab Results   Component Value Date    CHOL 120 09/24/2024    CHOL 116 09/09/2024    CHOL 223 (H) 01/25/2024    HDL 44 09/24/2024    HDL 41 09/09/2024    HDL 43 01/25/2024    LDL 54 09/24/2024    LDL 53 09/09/2024     (H) 01/25/2024    TRIG 111 09/24/2024    TRIG 112 09/09/2024    TRIG 297 (H) 01/25/2024       ADVANCED LIPID TESTS: No    LIVER ENZYME RESULTS:  Lab Results   Component Value Date    AST 32 09/24/2024    ALT 35 09/24/2024       CBC RESULTS:  Lab Results   Component Value Date    WBC 6.0 02/13/2024    RBC 5.61 02/13/2024    HGB 15.8 02/13/2024    HCT 46.9 02/13/2024    MCV 84 02/13/2024    MCH 28.2 02/13/2024    MCHC 33.7 02/13/2024    RDW 13.2 02/13/2024     02/13/2024       BMP RESULTS:  Lab Results   Component Value Date     09/24/2024    POTASSIUM 4.3 09/24/2024    POTASSIUM 4.2 06/07/2022    CHLORIDE 104 09/24/2024    CHLORIDE 104 06/07/2022    CO2 28 09/24/2024    CO2 23 06/07/2022    ANIONGAP 11 09/24/2024    ANIONGAP 12 06/07/2022     (H) 09/24/2024     (H) 06/24/2023    GLC 86 06/07/2022    BUN 16.5 09/24/2024    BUN 19 06/07/2022    CR 1.07 09/24/2024    GFRESTIMATED 76 09/24/2024    JASMINE 9.7 09/24/2024        A1C RESULTS:  Lab Results   Component Value Date    A1C 6.0 (H) 09/24/2024       THYROID RESULTS:  Lab Results   Component Value Date    TSH 0.58 06/21/2023       CPK RESULTS:No    CARDIAC STUDIES: Yes      Procedures:     ECHO 2/13/2024  \"Interpretation Summary     The left ventricle is normal in size. There is normal left ventricular wall  thickness.  Hyperdynamic left ventricular function The visual ejection fraction is 65-70%.  No " "regional wall motion abnormalities noted.     The right ventricle is borderline dilated.  The right ventricular systolic function is normal.  Normal left atrial size.  Right atrial size is normal.  Mild (35-45mmHg) pulmonary hypertension is present.  IVC diameter <2.1 cm collapsing >50% with sniff suggests a normal RA pressure  of 3 mmHg.\"    Lexiscan normal 2/13/2024       Assessment and Plan:     1. Hyperlipidemia LDL goal <70    2. S/P carotid endarterectomy, Left, 6/23/2023    3. Benign essential hypertension    4. CKD (chronic kidney disease) stage 2, GFR 60-89 ml/min    5. IFG (impaired fasting glucose)    This is a very pleasant 68-year-old male with multiple atherosclerotic risk factors status post carotid endarterectomy lipids were not at goal with Crestor 40 mg daily, few weeks ago  initiated Zetia 10 mg daily tolerating and his blood pressure was not under control but his medications were recently adjusted and since then blood pressure is better.  He is taking aspirin.  Recent history of chest pain workup unremarkable  Goal of LDL in this patient is less than 70    Experiencing low BP   I will reduced amlodipine dose to 2.5 mg and continue rest same   Reviewed recent lab results with patient   Monitor blood pressure goal is less than 130/80       Avoid ibuprofen, Aleve, Advil type of medications  Go for surveillance carotid ultrasound in one year     Follow-up with primary care physician for all other issues     AVS with written instructions given    Repeat fasting labs , carotid US in  Feb/March 2025 then visit    This note was dictated by utilizing dragon software    Copy of this note to primary care physician     40 minutes spent on the day of the encounter reviewing chart, previous eval, HX, exam , documentation and addressed above issues       The longitudinal care of plan for the above diagnoses was addressed during this visit. Due to added complexity of care, we will continue to supprt Rich KNOTT " Zuhair and the subsequent management of this/these conditions and with ongoing continuity of care for this/these conditions.       Melissa Robertson MD,FAHA,FSVM,FNLA, FACP  Vascular Medicine  Clinical Hypertension Specialist   Clinical Lipidologist

## 2024-10-02 NOTE — PATIENT INSTRUCTIONS
Decrease amlodipine dose to 2.5 mg daily and take in the evening ( may cut the pill into half)     Take Lisinopril with hydrochlorothiazide in AM same dose     Continue rest of the medications same     Carotid Ultrasound and fasting labs in feb or march 2025 then visit

## 2024-10-13 DIAGNOSIS — I63.9 ACUTE CVA (CEREBROVASCULAR ACCIDENT) (H): ICD-10-CM

## 2024-10-14 RX ORDER — ASPIRIN 81 MG/1
81 TABLET, COATED ORAL DAILY
Qty: 390 TABLET | Refills: 0 | Status: SHIPPED | OUTPATIENT
Start: 2024-10-14

## 2025-02-21 ENCOUNTER — HOSPITAL ENCOUNTER (OUTPATIENT)
Dept: ULTRASOUND IMAGING | Facility: CLINIC | Age: 70
Discharge: HOME | End: 2025-02-21
Payer: COMMERCIAL

## 2025-02-21 DIAGNOSIS — I65.22 LEFT CAROTID STENOSIS: ICD-10-CM

## 2025-02-21 PROCEDURE — 93880 EXTRACRANIAL BILAT STUDY: CPT

## 2025-03-04 ENCOUNTER — OFFICE VISIT (OUTPATIENT)
Dept: OTHER | Facility: CLINIC | Age: 70
End: 2025-03-04
Payer: COMMERCIAL

## 2025-03-04 VITALS
WEIGHT: 176 LBS | OXYGEN SATURATION: 95 % | HEART RATE: 68 BPM | SYSTOLIC BLOOD PRESSURE: 156 MMHG | DIASTOLIC BLOOD PRESSURE: 83 MMHG | BODY MASS INDEX: 29.29 KG/M2

## 2025-03-04 DIAGNOSIS — Z98.890 S/P CAROTID ENDARTERECTOMY: ICD-10-CM

## 2025-03-04 DIAGNOSIS — E78.5 HYPERLIPIDEMIA LDL GOAL <70: Primary | ICD-10-CM

## 2025-03-04 DIAGNOSIS — I10 BENIGN ESSENTIAL HYPERTENSION: ICD-10-CM

## 2025-03-04 PROCEDURE — G0463 HOSPITAL OUTPT CLINIC VISIT: HCPCS | Performed by: INTERNAL MEDICINE

## 2025-03-04 PROCEDURE — 3077F SYST BP >= 140 MM HG: CPT | Performed by: INTERNAL MEDICINE

## 2025-03-04 PROCEDURE — G2211 COMPLEX E/M VISIT ADD ON: HCPCS | Performed by: INTERNAL MEDICINE

## 2025-03-04 PROCEDURE — 3079F DIAST BP 80-89 MM HG: CPT | Performed by: INTERNAL MEDICINE

## 2025-03-04 PROCEDURE — 99215 OFFICE O/P EST HI 40 MIN: CPT | Performed by: INTERNAL MEDICINE

## 2025-03-04 RX ORDER — ROSUVASTATIN CALCIUM 40 MG/1
40 TABLET, COATED ORAL DAILY
Qty: 90 TABLET | Refills: 3 | Status: SHIPPED | OUTPATIENT
Start: 2025-03-04

## 2025-03-04 RX ORDER — AMLODIPINE BESYLATE 5 MG/1
5 TABLET ORAL DAILY
Qty: 30 TABLET | Refills: 11 | Status: SHIPPED | OUTPATIENT
Start: 2025-03-04

## 2025-03-04 RX ORDER — LISINOPRIL AND HYDROCHLOROTHIAZIDE 12.5; 2 MG/1; MG/1
1 TABLET ORAL EVERY MORNING
Qty: 30 TABLET | Refills: 11 | Status: SHIPPED | OUTPATIENT
Start: 2025-03-04

## 2025-03-04 NOTE — PROGRESS NOTES
[facility]        Patient is here for a follow up  to discuss labs done 02/14/25   Ultrasound done 02/21/25     Pt is currently taking Aspirin and Statin.    BP (!) 156/83 (BP Location: Left arm, Patient Position: Sitting, Cuff Size: Adult Regular)   Pulse 68   Wt 176 lb (79.8 kg)   SpO2 95%   BMI 29.29 kg/m      The provider has been notified that the patient has concerns of intermittent dizziness.     Questions patient would like addressed today are: N/A.    Refills are needed: No    Has homecare services and agency name:  Kelly Langley RN  [facility] [place]

## 2025-03-04 NOTE — PATIENT INSTRUCTIONS
Increase amlodipine dose to 5 mg daily ( take full tablet)  refilled medications     Continue rest of the medications same     Fasting labs, CMP in one year then  office visit , order placed

## 2025-03-04 NOTE — PROGRESS NOTES
[facility] FOLLOW UP VIIST      PRIMARY HEALTH CARE PROVIDER:  Timbo Franco MD    Known Hx of Hyperlipidemia, Known carotid disease s/p Left CEA, HTN, med refills etc       HPI: Rich Moffett is a 69 year old very pleasant male with multiple medical issues former smoker with known history of carotid disease status post successful left-sided carotid endarterectomy in June 2023, hypertension, CKD, IFG and hyperlipidemia he was taking Crestor 40 mg daily LDL is well controlled .  He also has a fluctuating and high blood pressure recently adjusted medications currently taking amlodipine 2.5 mg daily and also is taking lisinopril with hydrochlorothiazide 20/12. 5 in the morning   Recent carotid US less than 50 % stenosis  He denies any claudication symptoms he is able to walk miles without any problems    Reviewed recent labs , excellent lipids       PAST MEDICAL HISTORY  Past Medical History:   Diagnosis Date    Carotid stenosis Left s/p CEA     HTN (hypertension)     Hyperlipidemia LDL goal <70        CURRENT MEDICATIONS  Current Outpatient Medications   Medication Sig Dispense Refill    acetaminophen (TYLENOL) 325 MG tablet Take 2 tablets (650 mg) by mouth every 4 hours as needed for mild pain      amLODIPine (NORVASC) 5 MG tablet Take 1 tablet (5 mg) by mouth daily 30 tablet 11    ASPIRIN LOW DOSE 81 MG EC tablet TAKE ONE TABLET BY MOUTH ONE TIME DAILY 390 tablet 0    diclofenac (VOLTAREN) 1 % topical gel Apply 2 g topically 4 times daily as needed for moderate pain      lisinopril-hydrochlorothiazide (ZESTORETIC) 20-12.5 MG tablet Take 1 tablet by mouth every morning 30 tablet 11    predniSONE (DELTASONE) 20 MG tablet Take 20 mg by mouth daily.      rosuvastatin (CRESTOR) 40 MG tablet Take 1 tablet (40 mg) by mouth daily 90 tablet 3     No current facility-administered medications for this visit.       PREVIOUSLY TRIED LIPID LOWERING  MEDICATIONS  Statin - Yes:  Rosuvastatin    INTOLERANCE TO LIPID MEDICATIONS  No None    PAST SURGICAL HISTORY:  Past Surgical History:   Procedure Laterality Date    ENDARTERECTOMY CAROTID Left 6/23/2023    Procedure: LEFT CAROTID ENDARTERECTOMY WITH ELECTROENCEPHALOGRAM AND INTRAOPERATIVE ULTRASOUND;  Surgeon: Kimberli Macdonald MD;  Location: [facility] OR       ALLERGIES   No Known Allergies    FAMILY HISTORY  Family History   Problem Relation Age of Onset    Hypertension Sister        CARDIOVASCULAR RISK FACTORS  1. Diabetes: No  2. Smoking: quit smoking some time ago.  3. HTN: fair control  4.Family history of Familial Hyperlipidemia No or Familial Hypertriglyceridemia No  5.Metabolic syndrome No  6. [place] Risk Calculator:  N/A , secondary prevention   7. 2013 GUIDELINES AHA RISK: <7.5 or >7.5  8. Age: 68 year old  9. Postmenopausal N/A  10.Prediabetes Yes  11. BMI: Body mass index is 29.29 kg/m .  12.SEX: male  13. CKD: Yes    SOCIAL HISTORY  Social History     Socioeconomic History    Marital status:      Spouse name: Not on file    Number of children: Not on file    Years of education: Not on file    Highest education level: Not on file   Occupational History    Not on file   Tobacco Use    Smoking status: Former     Types: Cigarettes    Smokeless tobacco: Never   Substance and Sexual Activity    Alcohol use: Yes     Comment: occ    Drug use: Never    Sexual activity: Not on file   Other Topics Concern    Not on file   Social History Narrative    Not on file     Social Drivers of Health     Financial Resource Strain: Not on file   Food Insecurity: Not on file   Transportation Needs: Not on file   Physical Activity: Not on file   Stress: Not on file   Social Connections: Not on file   Interpersonal Safety: Not on file   Housing Stability: Not on file       ROS:   General: No change in weight, sleep or appetite.  Normal energy.  No fever or chills  Eyes: Negative for vision changes or eye problems  ENT: No problems with  ears, nose or throat.  No difficulty swallowing.  Resp: No coughing, wheezing or shortness of breath  CV: No chest pains or palpitations  GI: No nausea, vomiting,  heartburn, abdominal pain, diarrhea, constipation or change in bowel habits  : No urinary frequency or dysuria, bladder or kidney problems  Musculoskeletal: No significant muscle or joint pains  Neurologic: No headaches, numbness, tingling, weakness, problems with balance or coordination  Psychiatric: No problems with anxiety, depression or mental health  Heme/immune/allergy: No history of bleeding or clotting problems or anemia.  No allergies or immune system problems  Endocrine: No history of thyroid disease, diabetes or other endocrine disorders  Skin: No rashes,worrisome lesions or skin problems  Vascular:  No claudication, lifestyle limiting or otherwise; no ischemic rest pain; no non-healing ulcers. No weakness, No loss of sensation      BASELINE MYALGIAS  No    EXAM:  BP (!) 156/83 (BP Location: Left arm, Patient Position: Sitting, Cuff Size: Adult Regular)   Pulse 68   Wt 176 lb (79.8 kg)   SpO2 95%   BMI 29.29 kg/m    In general, the patient is a pleasant male in no apparent distress.    HEENT: NC/AT.  PERRLA.  EOMI.  Sclerae white, not injected.  Nares clear.  Pharynx without erythema or exudate.  Dentition intact.    Neck: No adenopathy.  No thyromegaly. Carotids +2/2 bilaterally without bruits.  No jugular venous distension.   Heart: RRR. Normal S1, S2 splits physiologically. No murmur, rub, click, or gallop. The PMI is in the 5th ICS in the midclavicular line. There is no heave.    Lungs: CTA.  No ronchi, wheezes, rales.  No dullness to percussion.   Abdomen: Soft, nontender, nondistended. No organomegaly. No AAA.  No bruits.   Extremities: No clubbing, cyanosis, or edema.  No wounds.     No Xanthelasma, No arcus senilis, No Tendon Xanthomas, No Eruptive Xanthomas, No palmar crease abnormalities      Labs:  LIPID RESULTS:  Lab Results    Component Value Date    CHOL 159 02/14/2025    CHOL 120 09/24/2024    CHOL 116 09/09/2024    HDL 43 02/14/2025    HDL 44 09/24/2024    HDL 41 09/09/2024    LDL 88 02/14/2025    LDL 54 09/24/2024    LDL 53 09/09/2024    TRIG 141 02/14/2025    TRIG 111 09/24/2024    TRIG 112 09/09/2024       ADVANCED LIPID TESTS: No    LIVER ENZYME RESULTS:  Lab Results   Component Value Date    AST 26 02/14/2025    ALT 34 02/14/2025       CBC RESULTS:  Lab Results   Component Value Date    WBC 6.0 02/13/2024    RBC 5.61 02/13/2024    HGB 15.8 02/13/2024    HCT 46.9 02/13/2024    MCV 84 02/13/2024    MCH 28.2 02/13/2024    MCHC 33.7 02/13/2024    RDW 13.2 02/13/2024     02/13/2024       BMP RESULTS:  Lab Results   Component Value Date     02/14/2025    POTASSIUM 4.4 02/14/2025    POTASSIUM 4.2 06/07/2022    CHLORIDE 103 02/14/2025    CHLORIDE 104 06/07/2022    CO2 27 02/14/2025    CO2 23 06/07/2022    ANIONGAP 11 02/14/2025    ANIONGAP 12 06/07/2022     (H) 02/14/2025     (H) 06/24/2023    GLC 86 06/07/2022    BUN 19.2 02/14/2025    BUN 19 06/07/2022    CR 1.19 (H) 02/14/2025    GFRESTIMATED 67 02/14/2025    JASMINE 10.0 02/14/2025        A1C RESULTS:  Lab Results   Component Value Date    A1C 6.0 (H) 09/24/2024       THYROID RESULTS:  Lab Results   Component Value Date    TSH 0.58 06/21/2023       CPK RESULTS:No    CARDIAC STUDIES: Yes      Procedures:     EXAM: US CAROTID BILATERAL  LOCATION: [facility]  DATE: 2/21/2025     INDICATION:  Left carotid stenosis  COMPARISON: None.  TECHNIQUE: Duplex exam performed utilizing 2D gray-scale imaging, Doppler interrogation with color-flow and spectral waveform analysis. The percent diameter stenosis is determined using Updated Recommendations for Carotid Stenosis Interpretation Criteria   from [employer] [facility] Testing.     FINDINGS:     RIGHT: Mild plaque at the bifurcation. The peak systolic velocity in the ICA is less than 180 cm/sec,  "consistent with less than 50% stenosis. Normal velocities in the ECA. Antegrade flow within the vertebral artery.      LEFT: Mild plaque at the bifurcation. The peak systolic velocity in the ICA is less than 180 cm/sec, consistent with less than 50% stenosis. Normal velocities in the ECA. Antegrade flow within the vertebral artery.     VELOCITY CHART:  CCA   Right: 91/23 cm/s   Left: 91/26 cm/s  ICA   Right: 78/28 cm/s   Left: 119/41 cm/s  ECA   Right: 148/27 cm/s   Left: 140/27 cm/s  ICA/CCA PSV Ratio   Right: 0.86   Left: 1.4                                                                      IMPRESSION:  1.  Mild plaque formation, velocities consistent with less than 50% stenosis in the right internal carotid artery.  2.  Mild plaque formation, velocities consistent with less than 50% stenosis in the left internal carotid artery.  3.  Flow within the vertebral arteries is antegrade.       ECHO 2/13/2024  \"Interpretation Summary     The left ventricle is normal in size. There is normal left ventricular wall  thickness.  Hyperdynamic left ventricular function The visual ejection fraction is 65-70%.  No regional wall motion abnormalities noted.     The right ventricle is borderline dilated.  The right ventricular systolic function is normal.  Normal left atrial size.  Right atrial size is normal.  Mild (35-45mmHg) pulmonary hypertension is present.  IVC diameter <2.1 cm collapsing >50% with sniff suggests a normal RA pressure  of 3 mmHg.\"    Lexiscan normal 2/13/2024       Assessment and Plan:     1. Hyperlipidemia LDL goal <70    2. S/P carotid endarterectomy, Left, 6/23/2023    3. Benign essential hypertension    4. CKD (chronic kidney disease) stage 2, GFR 60-89 ml/min    5. IFG (impaired fasting glucose)    This is a very pleasant 69-year-old male with multiple atherosclerotic risk factors status post carotid endarterectomy lipids were at goal with Crestor 40 mg daily, BP not well controlled .  He is taking " aspirin.  Recent history of chest pain workup unremarkable  Goal of LDL in this patient is less than 70  Reviewed recent lab results with patient   Monitor blood pressure goal is less than 130/80  Increase amlodipine dose to 5 mg daily      Avoid ibuprofen, Aleve, Advil type of medications  Go for surveillance carotid ultrasound in one year     Follow-up with primary care physician for all other issues    40 minutes spent on the date of the encounter doing chart review, review of recent imaging studies, laboratory tests, history, exam, documentation, refill of the medications and adjustment of the medications etc.  AVS with written instructions given    The longitudinal care of plan for the above diagnoses was addressed during this visit. Due to added complexity of care, we will continue to supprt Rihc Moffett and the subsequent management of this/these conditions and with ongoing continuity of care for this/these conditions.       AVS with written instructions given    Repeat fasting labs , carotid US in  Feb/March 2026 then visit, lab orders placed    This note was dictated by utilizing dragon software    Copy of this note to primary care physician         Melissa Robertson MD,FAHA,FSVM,FNLA, FACP  Vascular Medicine  Clinical Hypertension Specialist   Clinical Lipidologist

## 2025-08-09 ENCOUNTER — HEALTH MAINTENANCE LETTER (OUTPATIENT)
Age: 70
End: 2025-08-09

## (undated) DEVICE — LINEN TOWEL PACK X5 5464

## (undated) DEVICE — SU SILK 2-0 TIE 24" SA75H

## (undated) DEVICE — CLIP ETHICON LIGACLIP SM BLUE LT100

## (undated) DEVICE — SYR 03ML LL W/O NDL 309657

## (undated) DEVICE — SU VICRYL 3-0 SH 27" J316H

## (undated) DEVICE — SU PROLENE 7-0 BV-1DA 24" 8702H

## (undated) DEVICE — SU MONOCRYL 4-0 PS-2 18" UND Y496G

## (undated) DEVICE — GLOVE BIOGEL PI MICRO SZ 6.5 48565

## (undated) DEVICE — SOL NACL 0.9% INJ 250ML BAG 2B1322Q

## (undated) DEVICE — IOM SUPPLIES

## (undated) DEVICE — SOL WATER IRRIG 1000ML BOTTLE 2F7114

## (undated) DEVICE — NDL 22GA 1.5"

## (undated) DEVICE — PACK VASCULAR SCV15VAFSB

## (undated) DEVICE — SYR 01ML 27GA 0.5" NDL TBC 309623

## (undated) DEVICE — SU SILK 3-0 TIE 24" SA74H

## (undated) DEVICE — SU PROLENE 6-0 C-1DA 30" 8706H

## (undated) DEVICE — SYR 10ML FINGER CONTROL W/O NDL 309695

## (undated) DEVICE — DECANTER VIAL 2006S

## (undated) DEVICE — PREP CHLORAPREP W/ORANGE TINT 10.5ML 930715

## (undated) DEVICE — SU VICRYL 2-0 CT-1 27" J339H

## (undated) DEVICE — IONM UP TO 7 HOURS

## (undated) DEVICE — DRSG STERI STRIP 1/2X4" R1547

## (undated) DEVICE — SURGICEL HEMOSTAT 2X3" 1953

## (undated) DEVICE — ESU GROUND PAD UNIVERSAL W/O CORD

## (undated) DEVICE — SOL NACL 0.9% IRRIG 1000ML BOTTLE 2F7124

## (undated) DEVICE — DECANTER BAG 2002S

## (undated) DEVICE — GLOVE BIOGEL PI MICRO INDICATOR UNDERGLOVE SZ 7.0 48970

## (undated) DEVICE — SPONGE SURGIFOAM 100 1974

## (undated) DEVICE — ADH LIQUID MASTISOL TOPICAL VIAL 2-3ML 0523-48

## (undated) DEVICE — ESU PENCIL W/SMOKE EVAC NEPTUNE STRYKER 0703-046-000

## (undated) DEVICE — NDL 19GA 1.5"

## (undated) RX ORDER — FENTANYL CITRATE 50 UG/ML
INJECTION, SOLUTION INTRAMUSCULAR; INTRAVENOUS
Status: DISPENSED
Start: 2023-06-23

## (undated) RX ORDER — HEPARIN SODIUM 1000 [USP'U]/ML
INJECTION, SOLUTION INTRAVENOUS; SUBCUTANEOUS
Status: DISPENSED
Start: 2023-06-23

## (undated) RX ORDER — LIDOCAINE HYDROCHLORIDE 10 MG/ML
INJECTION, SOLUTION INFILTRATION; PERINEURAL
Status: DISPENSED
Start: 2023-06-23

## (undated) RX ORDER — EPINEPHRINE 1 MG/ML
INJECTION, SOLUTION INTRAMUSCULAR; SUBCUTANEOUS
Status: DISPENSED
Start: 2023-06-23

## (undated) RX ORDER — PROTAMINE SULFATE 10 MG/ML
INJECTION, SOLUTION INTRAVENOUS
Status: DISPENSED
Start: 2023-06-23

## (undated) RX ORDER — EPHEDRINE SULFATE 50 MG/ML
INJECTION, SOLUTION INTRAMUSCULAR; INTRAVENOUS; SUBCUTANEOUS
Status: DISPENSED
Start: 2023-06-23

## (undated) RX ORDER — PROPOFOL 10 MG/ML
INJECTION, EMULSION INTRAVENOUS
Status: DISPENSED
Start: 2023-06-23